# Patient Record
Sex: MALE | Race: WHITE | Employment: UNEMPLOYED | URBAN - METROPOLITAN AREA
[De-identification: names, ages, dates, MRNs, and addresses within clinical notes are randomized per-mention and may not be internally consistent; named-entity substitution may affect disease eponyms.]

---

## 2019-11-22 ENCOUNTER — HOSPITAL ENCOUNTER (OUTPATIENT)
Age: 44
Setting detail: OBSERVATION
Discharge: PSYCHIATRIC OR SUBSTANCE USE FACILITY | DRG: 753 | End: 2019-11-25
Attending: INTERNAL MEDICINE | Admitting: INTERNAL MEDICINE
Payer: MEDICAID

## 2019-11-22 ENCOUNTER — APPOINTMENT (OUTPATIENT)
Dept: CT IMAGING | Age: 44
DRG: 753 | End: 2019-11-22
Payer: MEDICAID

## 2019-11-22 DIAGNOSIS — F32.A DEPRESSION WITH SUICIDAL IDEATION: ICD-10-CM

## 2019-11-22 DIAGNOSIS — R45.851 DEPRESSION WITH SUICIDAL IDEATION: ICD-10-CM

## 2019-11-22 DIAGNOSIS — F10.939 ALCOHOL WITHDRAWAL SYNDROME WITH COMPLICATION (HCC): Primary | ICD-10-CM

## 2019-11-22 LAB
ACETAMINOPHEN LEVEL: <5 UG/ML (ref 10–30)
ALBUMIN SERPL-MCNC: 4.7 G/DL (ref 3.5–4.6)
ALP BLD-CCNC: 93 U/L (ref 35–104)
ALT SERPL-CCNC: 64 U/L (ref 0–41)
AMPHETAMINE SCREEN, URINE: NORMAL
ANION GAP SERPL CALCULATED.3IONS-SCNC: 13 MEQ/L (ref 9–15)
AST SERPL-CCNC: 111 U/L (ref 0–40)
BACTERIA: NEGATIVE /HPF
BARBITURATE SCREEN URINE: NORMAL
BASOPHILS ABSOLUTE: 0.1 K/UL (ref 0–0.2)
BASOPHILS RELATIVE PERCENT: 1.1 %
BENZODIAZEPINE SCREEN, URINE: NORMAL
BILIRUB SERPL-MCNC: 0.4 MG/DL (ref 0.2–0.7)
BILIRUBIN URINE: NEGATIVE
BLOOD, URINE: NEGATIVE
BUN BLDV-MCNC: 4 MG/DL (ref 6–20)
CALCIUM SERPL-MCNC: 8.8 MG/DL (ref 8.5–9.9)
CANNABINOID SCREEN URINE: NORMAL
CHLORIDE BLD-SCNC: 91 MEQ/L (ref 95–107)
CK MB: 2.3 NG/ML (ref 0–6.7)
CLARITY: CLEAR
CO2: 28 MEQ/L (ref 20–31)
COCAINE METABOLITE SCREEN URINE: NORMAL
COLOR: YELLOW
CREAT SERPL-MCNC: 0.45 MG/DL (ref 0.7–1.2)
CREATINE KINASE-MB INDEX: 1.1 % (ref 0–3.5)
D DIMER: 0.89 MG/L FEU (ref 0–0.5)
EOSINOPHILS ABSOLUTE: 0.1 K/UL (ref 0–0.7)
EOSINOPHILS RELATIVE PERCENT: 1.7 %
EPITHELIAL CELLS, UA: NORMAL /HPF
ETHANOL PERCENT: 0.37 G/DL
ETHANOL: 421 MG/DL (ref 0–0.08)
GFR AFRICAN AMERICAN: >60
GFR NON-AFRICAN AMERICAN: >60
GLOBULIN: 3.4 G/DL (ref 2.3–3.5)
GLUCOSE BLD-MCNC: 85 MG/DL (ref 70–99)
GLUCOSE URINE: NEGATIVE MG/DL
HCT VFR BLD CALC: 47 % (ref 42–52)
HEMOGLOBIN: 16.2 G/DL (ref 14–18)
KETONES, URINE: NEGATIVE MG/DL
LEUKOCYTE ESTERASE, URINE: ABNORMAL
LYMPHOCYTES ABSOLUTE: 2.1 K/UL (ref 1–4.8)
LYMPHOCYTES RELATIVE PERCENT: 33.9 %
Lab: NORMAL
MCH RBC QN AUTO: 34.2 PG (ref 27–31.3)
MCHC RBC AUTO-ENTMCNC: 34.4 % (ref 33–37)
MCV RBC AUTO: 99.6 FL (ref 80–100)
METHADONE SCREEN, URINE: NORMAL
MONOCYTES ABSOLUTE: 0.5 K/UL (ref 0.2–0.8)
MONOCYTES RELATIVE PERCENT: 7.5 %
NEUTROPHILS ABSOLUTE: 3.5 K/UL (ref 1.4–6.5)
NEUTROPHILS RELATIVE PERCENT: 55.8 %
NITRITE, URINE: NEGATIVE
OPIATE SCREEN URINE: NORMAL
OXYCODONE URINE: NORMAL
PDW BLD-RTO: 13.5 % (ref 11.5–14.5)
PH UA: 6.5 (ref 5–9)
PHENCYCLIDINE SCREEN URINE: NORMAL
PLATELET # BLD: 184 K/UL (ref 130–400)
POTASSIUM SERPL-SCNC: 3.9 MEQ/L (ref 3.4–4.9)
PROPOXYPHENE SCREEN: NORMAL
PROTEIN UA: NEGATIVE MG/DL
RBC # BLD: 4.72 M/UL (ref 4.7–6.1)
RBC UA: NORMAL /HPF (ref 0–2)
SALICYLATE, SERUM: <0.3 MG/DL (ref 15–30)
SODIUM BLD-SCNC: 132 MEQ/L (ref 135–144)
SPECIFIC GRAVITY UA: 1 (ref 1–1.03)
TOTAL CK: 217 U/L (ref 0–190)
TOTAL PROTEIN: 8.1 G/DL (ref 6.3–8)
TSH SERPL DL<=0.05 MIU/L-ACNC: 2.02 UIU/ML (ref 0.44–3.86)
URINE REFLEX TO CULTURE: YES
UROBILINOGEN, URINE: 0.2 E.U./DL
WBC # BLD: 6.3 K/UL (ref 4.8–10.8)
WBC UA: NORMAL /HPF (ref 0–5)

## 2019-11-22 PROCEDURE — 80307 DRUG TEST PRSMV CHEM ANLYZR: CPT

## 2019-11-22 PROCEDURE — 85025 COMPLETE CBC W/AUTO DIFF WBC: CPT

## 2019-11-22 PROCEDURE — 71275 CT ANGIOGRAPHY CHEST: CPT

## 2019-11-22 PROCEDURE — 6370000000 HC RX 637 (ALT 250 FOR IP): Performed by: PHYSICIAN ASSISTANT

## 2019-11-22 PROCEDURE — 6360000004 HC RX CONTRAST MEDICATION: Performed by: PHYSICIAN ASSISTANT

## 2019-11-22 PROCEDURE — G0480 DRUG TEST DEF 1-7 CLASSES: HCPCS

## 2019-11-22 PROCEDURE — 99285 EMERGENCY DEPT VISIT HI MDM: CPT

## 2019-11-22 PROCEDURE — 82553 CREATINE MB FRACTION: CPT

## 2019-11-22 PROCEDURE — 87086 URINE CULTURE/COLONY COUNT: CPT

## 2019-11-22 PROCEDURE — 36415 COLL VENOUS BLD VENIPUNCTURE: CPT

## 2019-11-22 PROCEDURE — 82550 ASSAY OF CK (CPK): CPT

## 2019-11-22 PROCEDURE — 85379 FIBRIN DEGRADATION QUANT: CPT

## 2019-11-22 PROCEDURE — 84443 ASSAY THYROID STIM HORMONE: CPT

## 2019-11-22 PROCEDURE — 81001 URINALYSIS AUTO W/SCOPE: CPT

## 2019-11-22 PROCEDURE — 80053 COMPREHEN METABOLIC PANEL: CPT

## 2019-11-22 RX ORDER — NICOTINE 21 MG/24HR
1 PATCH, TRANSDERMAL 24 HOURS TRANSDERMAL ONCE
Status: COMPLETED | OUTPATIENT
Start: 2019-11-22 | End: 2019-11-23

## 2019-11-22 RX ADMIN — IOPAMIDOL 100 ML: 612 INJECTION, SOLUTION INTRAVENOUS at 22:34

## 2019-11-22 ASSESSMENT — ENCOUNTER SYMPTOMS
COUGH: 1
VOMITING: 0
APNEA: 0
NAUSEA: 0
ANAL BLEEDING: 0
EYE DISCHARGE: 0
VOICE CHANGE: 0
PHOTOPHOBIA: 0
ABDOMINAL DISTENTION: 0

## 2019-11-23 LAB
EKG ATRIAL RATE: 79 BPM
EKG P AXIS: 71 DEGREES
EKG P-R INTERVAL: 150 MS
EKG Q-T INTERVAL: 360 MS
EKG QRS DURATION: 84 MS
EKG QTC CALCULATION (BAZETT): 412 MS
EKG R AXIS: 72 DEGREES
EKG T AXIS: 70 DEGREES
EKG VENTRICULAR RATE: 79 BPM
ETHANOL PERCENT: 0.01 G/DL
ETHANOL: 16 MG/DL (ref 0–0.08)

## 2019-11-23 PROCEDURE — 6370000000 HC RX 637 (ALT 250 FOR IP): Performed by: EMERGENCY MEDICINE

## 2019-11-23 PROCEDURE — 6360000002 HC RX W HCPCS: Performed by: STUDENT IN AN ORGANIZED HEALTH CARE EDUCATION/TRAINING PROGRAM

## 2019-11-23 PROCEDURE — 36415 COLL VENOUS BLD VENIPUNCTURE: CPT

## 2019-11-23 PROCEDURE — 96374 THER/PROPH/DIAG INJ IV PUSH: CPT

## 2019-11-23 PROCEDURE — 6370000000 HC RX 637 (ALT 250 FOR IP): Performed by: STUDENT IN AN ORGANIZED HEALTH CARE EDUCATION/TRAINING PROGRAM

## 2019-11-23 PROCEDURE — G0480 DRUG TEST DEF 1-7 CLASSES: HCPCS

## 2019-11-23 PROCEDURE — 93005 ELECTROCARDIOGRAM TRACING: CPT | Performed by: EMERGENCY MEDICINE

## 2019-11-23 RX ORDER — CHLORDIAZEPOXIDE HYDROCHLORIDE 25 MG/1
50 CAPSULE, GELATIN COATED ORAL 4 TIMES DAILY
Status: DISCONTINUED | OUTPATIENT
Start: 2019-11-23 | End: 2019-11-24

## 2019-11-23 RX ORDER — CHLORDIAZEPOXIDE HYDROCHLORIDE 25 MG/1
50 CAPSULE, GELATIN COATED ORAL ONCE
Status: COMPLETED | OUTPATIENT
Start: 2019-11-23 | End: 2019-11-23

## 2019-11-23 RX ORDER — ACETAMINOPHEN 325 MG/1
650 TABLET ORAL EVERY 4 HOURS PRN
Status: DISCONTINUED | OUTPATIENT
Start: 2019-11-23 | End: 2019-11-25 | Stop reason: HOSPADM

## 2019-11-23 RX ORDER — LORAZEPAM 1 MG/1
2 TABLET ORAL ONCE
Status: COMPLETED | OUTPATIENT
Start: 2019-11-23 | End: 2019-11-23

## 2019-11-23 RX ORDER — LORAZEPAM 2 MG/ML
2 INJECTION INTRAMUSCULAR ONCE
Status: COMPLETED | OUTPATIENT
Start: 2019-11-23 | End: 2019-11-23

## 2019-11-23 RX ADMIN — LORAZEPAM 2 MG: 2 INJECTION INTRAMUSCULAR; INTRAVENOUS at 15:46

## 2019-11-23 RX ADMIN — CHLORDIAZEPOXIDE HYDROCHLORIDE 50 MG: 25 CAPSULE ORAL at 20:29

## 2019-11-23 RX ADMIN — ACETAMINOPHEN 650 MG: 325 TABLET ORAL at 21:06

## 2019-11-23 RX ADMIN — LORAZEPAM 2 MG: 1 TABLET ORAL at 19:02

## 2019-11-23 RX ADMIN — CHLORDIAZEPOXIDE HYDROCHLORIDE 50 MG: 25 CAPSULE ORAL at 22:24

## 2019-11-23 RX ADMIN — LORAZEPAM 2 MG: 1 TABLET ORAL at 07:39

## 2019-11-23 ASSESSMENT — PAIN SCALES - GENERAL: PAINLEVEL_OUTOF10: 5

## 2019-11-24 PROBLEM — F10.930 ALCOHOL WITHDRAWAL SYNDROME WITHOUT COMPLICATION (HCC): Status: ACTIVE | Noted: 2019-11-24

## 2019-11-24 LAB
ALBUMIN SERPL-MCNC: 4.3 G/DL (ref 3.5–4.6)
ALP BLD-CCNC: 64 U/L (ref 35–104)
ALT SERPL-CCNC: 85 U/L (ref 0–41)
ANION GAP SERPL CALCULATED.3IONS-SCNC: 11 MEQ/L (ref 9–15)
AST SERPL-CCNC: 159 U/L (ref 0–40)
BASOPHILS ABSOLUTE: 0 K/UL (ref 0–0.2)
BASOPHILS RELATIVE PERCENT: 0.7 %
BILIRUB SERPL-MCNC: 0.9 MG/DL (ref 0.2–0.7)
BUN BLDV-MCNC: 10 MG/DL (ref 6–20)
CALCIUM SERPL-MCNC: 9.2 MG/DL (ref 8.5–9.9)
CHLORIDE BLD-SCNC: 101 MEQ/L (ref 95–107)
CO2: 29 MEQ/L (ref 20–31)
CREAT SERPL-MCNC: 0.64 MG/DL (ref 0.7–1.2)
EOSINOPHILS ABSOLUTE: 0.2 K/UL (ref 0–0.7)
EOSINOPHILS RELATIVE PERCENT: 2.5 %
GFR AFRICAN AMERICAN: >60
GFR NON-AFRICAN AMERICAN: >60
GLOBULIN: 3.2 G/DL (ref 2.3–3.5)
GLUCOSE BLD-MCNC: 86 MG/DL (ref 70–99)
HCT VFR BLD CALC: 48.9 % (ref 42–52)
HEMOGLOBIN: 16.5 G/DL (ref 14–18)
INR BLD: 1
LYMPHOCYTES ABSOLUTE: 1 K/UL (ref 1–4.8)
LYMPHOCYTES RELATIVE PERCENT: 15.3 %
MAGNESIUM: 2.3 MG/DL (ref 1.7–2.4)
MCH RBC QN AUTO: 34.1 PG (ref 27–31.3)
MCHC RBC AUTO-ENTMCNC: 33.8 % (ref 33–37)
MCV RBC AUTO: 100.9 FL (ref 80–100)
MONOCYTES ABSOLUTE: 0.4 K/UL (ref 0.2–0.8)
MONOCYTES RELATIVE PERCENT: 6.1 %
NEUTROPHILS ABSOLUTE: 4.8 K/UL (ref 1.4–6.5)
NEUTROPHILS RELATIVE PERCENT: 75.4 %
PDW BLD-RTO: 13.5 % (ref 11.5–14.5)
PHOSPHORUS: 3.9 MG/DL (ref 2.3–4.8)
PLATELET # BLD: 169 K/UL (ref 130–400)
POTASSIUM SERPL-SCNC: 4.4 MEQ/L (ref 3.4–4.9)
PROTHROMBIN TIME: 13.2 SEC (ref 12.3–14.9)
RBC # BLD: 4.85 M/UL (ref 4.7–6.1)
SODIUM BLD-SCNC: 141 MEQ/L (ref 135–144)
TOTAL PROTEIN: 7.5 G/DL (ref 6.3–8)
URINE CULTURE, ROUTINE: NORMAL
WBC # BLD: 6.3 K/UL (ref 4.8–10.8)

## 2019-11-24 PROCEDURE — 84100 ASSAY OF PHOSPHORUS: CPT

## 2019-11-24 PROCEDURE — 6360000002 HC RX W HCPCS: Performed by: INTERNAL MEDICINE

## 2019-11-24 PROCEDURE — G0378 HOSPITAL OBSERVATION PER HR: HCPCS

## 2019-11-24 PROCEDURE — 83735 ASSAY OF MAGNESIUM: CPT

## 2019-11-24 PROCEDURE — 6370000000 HC RX 637 (ALT 250 FOR IP): Performed by: NURSE PRACTITIONER

## 2019-11-24 PROCEDURE — 80053 COMPREHEN METABOLIC PANEL: CPT

## 2019-11-24 PROCEDURE — 2580000003 HC RX 258: Performed by: INTERNAL MEDICINE

## 2019-11-24 PROCEDURE — 96372 THER/PROPH/DIAG INJ SC/IM: CPT

## 2019-11-24 PROCEDURE — 36415 COLL VENOUS BLD VENIPUNCTURE: CPT

## 2019-11-24 PROCEDURE — 85025 COMPLETE CBC W/AUTO DIFF WBC: CPT

## 2019-11-24 PROCEDURE — 87389 HIV-1 AG W/HIV-1&-2 AB AG IA: CPT

## 2019-11-24 PROCEDURE — 6370000000 HC RX 637 (ALT 250 FOR IP): Performed by: EMERGENCY MEDICINE

## 2019-11-24 PROCEDURE — 6370000000 HC RX 637 (ALT 250 FOR IP): Performed by: INTERNAL MEDICINE

## 2019-11-24 PROCEDURE — 85610 PROTHROMBIN TIME: CPT

## 2019-11-24 RX ORDER — CHLORDIAZEPOXIDE HYDROCHLORIDE 25 MG/1
25 CAPSULE, GELATIN COATED ORAL ONCE
Status: COMPLETED | OUTPATIENT
Start: 2019-11-24 | End: 2019-11-24

## 2019-11-24 RX ORDER — LORAZEPAM 2 MG/ML
2 INJECTION INTRAMUSCULAR
Status: DISCONTINUED | OUTPATIENT
Start: 2019-11-24 | End: 2019-11-25 | Stop reason: HOSPADM

## 2019-11-24 RX ORDER — ONDANSETRON 4 MG/1
4 TABLET, FILM COATED ORAL EVERY 8 HOURS PRN
Status: DISCONTINUED | OUTPATIENT
Start: 2019-11-24 | End: 2019-11-25 | Stop reason: HOSPADM

## 2019-11-24 RX ORDER — NICOTINE 21 MG/24HR
1 PATCH, TRANSDERMAL 24 HOURS TRANSDERMAL DAILY
Status: DISCONTINUED | OUTPATIENT
Start: 2019-11-24 | End: 2019-11-25 | Stop reason: HOSPADM

## 2019-11-24 RX ORDER — CLONIDINE HYDROCHLORIDE 0.1 MG/1
0.1 TABLET ORAL ONCE
Status: COMPLETED | OUTPATIENT
Start: 2019-11-24 | End: 2019-11-24

## 2019-11-24 RX ORDER — LORAZEPAM 2 MG/ML
1 INJECTION INTRAMUSCULAR
Status: DISCONTINUED | OUTPATIENT
Start: 2019-11-24 | End: 2019-11-25 | Stop reason: HOSPADM

## 2019-11-24 RX ORDER — LORAZEPAM 1 MG/1
3 TABLET ORAL
Status: DISCONTINUED | OUTPATIENT
Start: 2019-11-24 | End: 2019-11-25 | Stop reason: HOSPADM

## 2019-11-24 RX ORDER — LORAZEPAM 2 MG/ML
4 INJECTION INTRAMUSCULAR
Status: DISCONTINUED | OUTPATIENT
Start: 2019-11-24 | End: 2019-11-25 | Stop reason: HOSPADM

## 2019-11-24 RX ORDER — FOLIC ACID 1 MG/1
1 TABLET ORAL DAILY
Status: DISCONTINUED | OUTPATIENT
Start: 2019-11-24 | End: 2019-11-25 | Stop reason: HOSPADM

## 2019-11-24 RX ORDER — LORAZEPAM 1 MG/1
2 TABLET ORAL ONCE
Status: COMPLETED | OUTPATIENT
Start: 2019-11-24 | End: 2019-11-24

## 2019-11-24 RX ORDER — LORAZEPAM 1 MG/1
1 TABLET ORAL ONCE
Status: COMPLETED | OUTPATIENT
Start: 2019-11-24 | End: 2019-11-24

## 2019-11-24 RX ORDER — CHLORDIAZEPOXIDE HYDROCHLORIDE 10 MG/1
10 CAPSULE, GELATIN COATED ORAL EVERY 6 HOURS
Status: COMPLETED | OUTPATIENT
Start: 2019-11-24 | End: 2019-11-24

## 2019-11-24 RX ORDER — LORAZEPAM 1 MG/1
4 TABLET ORAL
Status: DISCONTINUED | OUTPATIENT
Start: 2019-11-24 | End: 2019-11-25 | Stop reason: HOSPADM

## 2019-11-24 RX ORDER — SODIUM CHLORIDE 0.9 % (FLUSH) 0.9 %
10 SYRINGE (ML) INJECTION EVERY 12 HOURS SCHEDULED
Status: DISCONTINUED | OUTPATIENT
Start: 2019-11-24 | End: 2019-11-25 | Stop reason: HOSPADM

## 2019-11-24 RX ORDER — LORAZEPAM 1 MG/1
2 TABLET ORAL
Status: DISCONTINUED | OUTPATIENT
Start: 2019-11-24 | End: 2019-11-25 | Stop reason: HOSPADM

## 2019-11-24 RX ORDER — ONDANSETRON 2 MG/ML
4 INJECTION INTRAMUSCULAR; INTRAVENOUS EVERY 6 HOURS PRN
Status: DISCONTINUED | OUTPATIENT
Start: 2019-11-24 | End: 2019-11-25 | Stop reason: HOSPADM

## 2019-11-24 RX ORDER — SODIUM CHLORIDE 0.9 % (FLUSH) 0.9 %
10 SYRINGE (ML) INJECTION PRN
Status: DISCONTINUED | OUTPATIENT
Start: 2019-11-24 | End: 2019-11-25 | Stop reason: HOSPADM

## 2019-11-24 RX ORDER — LORAZEPAM 2 MG/ML
3 INJECTION INTRAMUSCULAR
Status: DISCONTINUED | OUTPATIENT
Start: 2019-11-24 | End: 2019-11-25 | Stop reason: HOSPADM

## 2019-11-24 RX ORDER — LORAZEPAM 1 MG/1
1 TABLET ORAL
Status: DISCONTINUED | OUTPATIENT
Start: 2019-11-24 | End: 2019-11-25 | Stop reason: HOSPADM

## 2019-11-24 RX ORDER — MULTIVITAMIN WITH FOLIC ACID 400 MCG
1 TABLET ORAL DAILY
Status: DISCONTINUED | OUTPATIENT
Start: 2019-11-24 | End: 2019-11-25 | Stop reason: HOSPADM

## 2019-11-24 RX ORDER — THIAMINE MONONITRATE (VIT B1) 100 MG
100 TABLET ORAL DAILY
Status: DISCONTINUED | OUTPATIENT
Start: 2019-11-24 | End: 2019-11-25 | Stop reason: HOSPADM

## 2019-11-24 RX ORDER — NICOTINE 21 MG/24HR
1 PATCH, TRANSDERMAL 24 HOURS TRANSDERMAL DAILY
Status: DISCONTINUED | OUTPATIENT
Start: 2019-11-24 | End: 2019-11-24

## 2019-11-24 RX ADMIN — FOLIC ACID 1 MG: 1 TABLET ORAL at 09:14

## 2019-11-24 RX ADMIN — CHLORDIAZEPOXIDE HYDROCHLORIDE 25 MG: 25 CAPSULE ORAL at 09:13

## 2019-11-24 RX ADMIN — Medication 100 MG: at 09:13

## 2019-11-24 RX ADMIN — THERA TABS 1 TABLET: TAB at 09:14

## 2019-11-24 RX ADMIN — ENOXAPARIN SODIUM 40 MG: 40 INJECTION SUBCUTANEOUS at 09:14

## 2019-11-24 RX ADMIN — LORAZEPAM 2 MG: 1 TABLET ORAL at 06:16

## 2019-11-24 RX ADMIN — CLONIDINE HYDROCHLORIDE 0.1 MG: 0.1 TABLET ORAL at 09:14

## 2019-11-24 RX ADMIN — LORAZEPAM 2 MG: 1 TABLET ORAL at 03:55

## 2019-11-24 RX ADMIN — CHLORDIAZEPOXIDE HYDROCHLORIDE 10 MG: 10 CAPSULE ORAL at 14:15

## 2019-11-24 RX ADMIN — Medication 10 ML: at 20:07

## 2019-11-24 RX ADMIN — CHLORDIAZEPOXIDE HYDROCHLORIDE 10 MG: 10 CAPSULE ORAL at 20:07

## 2019-11-24 RX ADMIN — LORAZEPAM 1 MG: 1 TABLET ORAL at 00:24

## 2019-11-24 ASSESSMENT — PAIN SCALES - GENERAL: PAINLEVEL_OUTOF10: 0

## 2019-11-25 ENCOUNTER — HOSPITAL ENCOUNTER (INPATIENT)
Age: 44
LOS: 4 days | Discharge: HOME | DRG: 753 | End: 2019-11-29
Attending: PSYCHIATRY & NEUROLOGY | Admitting: PSYCHIATRY & NEUROLOGY
Payer: MEDICAID

## 2019-11-25 VITALS
SYSTOLIC BLOOD PRESSURE: 129 MMHG | OXYGEN SATURATION: 99 % | HEIGHT: 67 IN | RESPIRATION RATE: 16 BRPM | HEART RATE: 76 BPM | TEMPERATURE: 97 F | DIASTOLIC BLOOD PRESSURE: 89 MMHG | BODY MASS INDEX: 18.83 KG/M2 | WEIGHT: 120 LBS

## 2019-11-25 PROBLEM — F31.9 BIPOLAR I DISORDER (HCC): Status: ACTIVE | Noted: 2019-11-25

## 2019-11-25 PROCEDURE — 6360000002 HC RX W HCPCS: Performed by: INTERNAL MEDICINE

## 2019-11-25 PROCEDURE — G0378 HOSPITAL OBSERVATION PER HR: HCPCS

## 2019-11-25 PROCEDURE — 93010 ELECTROCARDIOGRAM REPORT: CPT | Performed by: INTERNAL MEDICINE

## 2019-11-25 PROCEDURE — 6370000000 HC RX 637 (ALT 250 FOR IP): Performed by: PSYCHIATRY & NEUROLOGY

## 2019-11-25 PROCEDURE — 6370000000 HC RX 637 (ALT 250 FOR IP): Performed by: NURSE PRACTITIONER

## 2019-11-25 PROCEDURE — 1240000000 HC EMOTIONAL WELLNESS R&B

## 2019-11-25 PROCEDURE — 2580000003 HC RX 258: Performed by: INTERNAL MEDICINE

## 2019-11-25 PROCEDURE — 90686 IIV4 VACC NO PRSV 0.5 ML IM: CPT | Performed by: INTERNAL MEDICINE

## 2019-11-25 PROCEDURE — 96372 THER/PROPH/DIAG INJ SC/IM: CPT

## 2019-11-25 PROCEDURE — 6370000000 HC RX 637 (ALT 250 FOR IP): Performed by: INTERNAL MEDICINE

## 2019-11-25 PROCEDURE — G0008 ADMIN INFLUENZA VIRUS VAC: HCPCS | Performed by: INTERNAL MEDICINE

## 2019-11-25 RX ORDER — ONDANSETRON 4 MG/1
4 TABLET, FILM COATED ORAL EVERY 8 HOURS PRN
Status: CANCELLED | OUTPATIENT
Start: 2019-11-25

## 2019-11-25 RX ORDER — NICOTINE 21 MG/24HR
1 PATCH, TRANSDERMAL 24 HOURS TRANSDERMAL DAILY
Status: CANCELLED | OUTPATIENT
Start: 2019-11-26

## 2019-11-25 RX ORDER — SODIUM CHLORIDE 0.9 % (FLUSH) 0.9 %
10 SYRINGE (ML) INJECTION PRN
Status: DISCONTINUED | OUTPATIENT
Start: 2019-11-25 | End: 2019-11-29 | Stop reason: HOSPADM

## 2019-11-25 RX ORDER — CHLORDIAZEPOXIDE HYDROCHLORIDE 25 MG/1
50 CAPSULE, GELATIN COATED ORAL
Status: DISCONTINUED | OUTPATIENT
Start: 2019-11-25 | End: 2019-11-29 | Stop reason: HOSPADM

## 2019-11-25 RX ORDER — TRAZODONE HYDROCHLORIDE 50 MG/1
50 TABLET ORAL NIGHTLY PRN
Status: DISCONTINUED | OUTPATIENT
Start: 2019-11-25 | End: 2019-11-29 | Stop reason: HOSPADM

## 2019-11-25 RX ORDER — BENZTROPINE MESYLATE 1 MG/ML
2 INJECTION INTRAMUSCULAR; INTRAVENOUS 2 TIMES DAILY PRN
Status: DISCONTINUED | OUTPATIENT
Start: 2019-11-25 | End: 2019-11-29 | Stop reason: HOSPADM

## 2019-11-25 RX ORDER — MAGNESIUM HYDROXIDE/ALUMINUM HYDROXICE/SIMETHICONE 120; 1200; 1200 MG/30ML; MG/30ML; MG/30ML
30 SUSPENSION ORAL PRN
Status: DISCONTINUED | OUTPATIENT
Start: 2019-11-25 | End: 2019-11-29 | Stop reason: HOSPADM

## 2019-11-25 RX ORDER — THIAMINE MONONITRATE (VIT B1) 100 MG
100 TABLET ORAL DAILY
Status: CANCELLED | OUTPATIENT
Start: 2019-11-26

## 2019-11-25 RX ORDER — BENZTROPINE MESYLATE 1 MG/ML
2 INJECTION INTRAMUSCULAR; INTRAVENOUS 2 TIMES DAILY PRN
Status: CANCELLED | OUTPATIENT
Start: 2019-11-25

## 2019-11-25 RX ORDER — CHLORDIAZEPOXIDE HYDROCHLORIDE 10 MG/1
10 CAPSULE, GELATIN COATED ORAL EVERY 4 HOURS PRN
Status: DISCONTINUED | OUTPATIENT
Start: 2019-11-25 | End: 2019-11-29 | Stop reason: HOSPADM

## 2019-11-25 RX ORDER — HYDROXYZINE PAMOATE 50 MG/1
50 CAPSULE ORAL EVERY 6 HOURS PRN
Status: DISCONTINUED | OUTPATIENT
Start: 2019-11-25 | End: 2019-11-29 | Stop reason: HOSPADM

## 2019-11-25 RX ORDER — CHLORDIAZEPOXIDE HYDROCHLORIDE 25 MG/1
25 CAPSULE, GELATIN COATED ORAL EVERY 4 HOURS PRN
Status: DISCONTINUED | OUTPATIENT
Start: 2019-11-25 | End: 2019-11-29 | Stop reason: HOSPADM

## 2019-11-25 RX ORDER — MULTIVITAMIN WITH FOLIC ACID 400 MCG
1 TABLET ORAL DAILY
Status: CANCELLED | OUTPATIENT
Start: 2019-11-26

## 2019-11-25 RX ORDER — HALOPERIDOL 5 MG/ML
5 INJECTION INTRAMUSCULAR EVERY 6 HOURS PRN
Status: DISCONTINUED | OUTPATIENT
Start: 2019-11-25 | End: 2019-11-29 | Stop reason: HOSPADM

## 2019-11-25 RX ORDER — ACETAMINOPHEN 325 MG/1
650 TABLET ORAL EVERY 4 HOURS PRN
Status: CANCELLED | OUTPATIENT
Start: 2019-11-25

## 2019-11-25 RX ORDER — MULTIVITAMIN WITH FOLIC ACID 400 MCG
1 TABLET ORAL DAILY
Status: DISCONTINUED | OUTPATIENT
Start: 2019-11-26 | End: 2019-11-29 | Stop reason: HOSPADM

## 2019-11-25 RX ORDER — SODIUM CHLORIDE 0.9 % (FLUSH) 0.9 %
10 SYRINGE (ML) INJECTION PRN
Status: CANCELLED | OUTPATIENT
Start: 2019-11-25

## 2019-11-25 RX ORDER — FOLIC ACID 1 MG/1
1 TABLET ORAL DAILY
Status: CANCELLED | OUTPATIENT
Start: 2019-11-26

## 2019-11-25 RX ORDER — FOLIC ACID 1 MG/1
1 TABLET ORAL DAILY
Status: DISCONTINUED | OUTPATIENT
Start: 2019-11-26 | End: 2019-11-29 | Stop reason: HOSPADM

## 2019-11-25 RX ORDER — ONDANSETRON 4 MG/1
4 TABLET, FILM COATED ORAL EVERY 8 HOURS PRN
Status: DISCONTINUED | OUTPATIENT
Start: 2019-11-25 | End: 2019-11-29 | Stop reason: HOSPADM

## 2019-11-25 RX ORDER — NICOTINE 21 MG/24HR
1 PATCH, TRANSDERMAL 24 HOURS TRANSDERMAL DAILY
Status: DISCONTINUED | OUTPATIENT
Start: 2019-11-26 | End: 2019-11-29 | Stop reason: HOSPADM

## 2019-11-25 RX ORDER — CHLORDIAZEPOXIDE HYDROCHLORIDE 25 MG/1
75 CAPSULE, GELATIN COATED ORAL
Status: DISCONTINUED | OUTPATIENT
Start: 2019-11-25 | End: 2019-11-29 | Stop reason: HOSPADM

## 2019-11-25 RX ORDER — SODIUM CHLORIDE 0.9 % (FLUSH) 0.9 %
10 SYRINGE (ML) INJECTION EVERY 12 HOURS SCHEDULED
Status: CANCELLED | OUTPATIENT
Start: 2019-11-25

## 2019-11-25 RX ORDER — TRAZODONE HYDROCHLORIDE 50 MG/1
50 TABLET ORAL NIGHTLY PRN
Status: CANCELLED | OUTPATIENT
Start: 2019-11-25

## 2019-11-25 RX ORDER — SODIUM CHLORIDE 0.9 % (FLUSH) 0.9 %
10 SYRINGE (ML) INJECTION EVERY 12 HOURS SCHEDULED
Status: DISCONTINUED | OUTPATIENT
Start: 2019-11-25 | End: 2019-11-26 | Stop reason: CLARIF

## 2019-11-25 RX ORDER — LORAZEPAM 1 MG/1
4 TABLET ORAL
Status: DISCONTINUED | OUTPATIENT
Start: 2019-11-25 | End: 2019-11-29 | Stop reason: HOSPADM

## 2019-11-25 RX ORDER — MAGNESIUM HYDROXIDE/ALUMINUM HYDROXICE/SIMETHICONE 120; 1200; 1200 MG/30ML; MG/30ML; MG/30ML
30 SUSPENSION ORAL PRN
Status: CANCELLED | OUTPATIENT
Start: 2019-11-25

## 2019-11-25 RX ORDER — THIAMINE MONONITRATE (VIT B1) 100 MG
100 TABLET ORAL DAILY
Status: DISCONTINUED | OUTPATIENT
Start: 2019-11-26 | End: 2019-11-29 | Stop reason: HOSPADM

## 2019-11-25 RX ORDER — ACETAMINOPHEN 325 MG/1
650 TABLET ORAL EVERY 4 HOURS PRN
Status: DISCONTINUED | OUTPATIENT
Start: 2019-11-25 | End: 2019-11-29 | Stop reason: HOSPADM

## 2019-11-25 RX ORDER — HYDROXYZINE HYDROCHLORIDE 50 MG/ML
50 INJECTION, SOLUTION INTRAMUSCULAR EVERY 6 HOURS PRN
Status: DISCONTINUED | OUTPATIENT
Start: 2019-11-25 | End: 2019-11-29 | Stop reason: HOSPADM

## 2019-11-25 RX ORDER — HALOPERIDOL 5 MG
5 TABLET ORAL EVERY 6 HOURS PRN
Status: DISCONTINUED | OUTPATIENT
Start: 2019-11-25 | End: 2019-11-29 | Stop reason: HOSPADM

## 2019-11-25 RX ADMIN — Medication 100 MG: at 08:38

## 2019-11-25 RX ADMIN — INFLUENZA A VIRUS A/BRISBANE/02/2018 IVR-190 (H1N1) ANTIGEN (PROPIOLACTONE INACTIVATED), INFLUENZA A VIRUS A/KANSAS/14/2017 X-327 (H3N2) ANTIGEN (PROPIOLACTONE INACTIVATED), INFLUENZA B VIRUS B/MARYLAND/15/2016 ANTIGEN (PROPIOLACTONE INACTIVATED), INFLUENZA B VIRUS B/PHUKET/3073/2013 BVR-1B ANTIGEN (PROPIOLACTONE INACTIVATED) 0.5 ML: 15; 15; 15; 15 INJECTION, SUSPENSION INTRAMUSCULAR at 08:34

## 2019-11-25 RX ADMIN — THERA TABS 1 TABLET: TAB at 08:39

## 2019-11-25 RX ADMIN — ENOXAPARIN SODIUM 40 MG: 40 INJECTION SUBCUTANEOUS at 08:33

## 2019-11-25 RX ADMIN — Medication 10 ML: at 10:01

## 2019-11-25 RX ADMIN — FOLIC ACID 1 MG: 1 TABLET ORAL at 08:39

## 2019-11-25 RX ADMIN — TRAZODONE HYDROCHLORIDE 50 MG: 50 TABLET ORAL at 20:45

## 2019-11-25 ASSESSMENT — PAIN SCALES - GENERAL
PAINLEVEL_OUTOF10: 0

## 2019-11-25 ASSESSMENT — SLEEP AND FATIGUE QUESTIONNAIRES
DO YOU HAVE DIFFICULTY SLEEPING: YES
AVERAGE NUMBER OF SLEEP HOURS: 6
DIFFICULTY STAYING ASLEEP: YES
DO YOU USE A SLEEP AID: YES
DIFFICULTY ARISING: NO
SLEEP PATTERN: DIFFICULTY FALLING ASLEEP;DISTURBED/INTERRUPTED SLEEP
RESTFUL SLEEP: YES
DIFFICULTY FALLING ASLEEP: YES

## 2019-11-25 ASSESSMENT — PATIENT HEALTH QUESTIONNAIRE - PHQ9: SUM OF ALL RESPONSES TO PHQ QUESTIONS 1-9: 7

## 2019-11-26 LAB
ALBUMIN SERPL-MCNC: 3.7 G/DL (ref 3.5–4.6)
ALP BLD-CCNC: 55 U/L (ref 35–104)
ALT SERPL-CCNC: 130 U/L (ref 0–41)
ANION GAP SERPL CALCULATED.3IONS-SCNC: 12 MEQ/L (ref 9–15)
AST SERPL-CCNC: 127 U/L (ref 0–40)
BILIRUB SERPL-MCNC: 0.5 MG/DL (ref 0.2–0.7)
BUN BLDV-MCNC: 9 MG/DL (ref 6–20)
CALCIUM SERPL-MCNC: 9 MG/DL (ref 8.5–9.9)
CHLORIDE BLD-SCNC: 103 MEQ/L (ref 95–107)
CHOLESTEROL, TOTAL: 146 MG/DL (ref 0–199)
CO2: 24 MEQ/L (ref 20–31)
CREAT SERPL-MCNC: 0.56 MG/DL (ref 0.7–1.2)
GFR AFRICAN AMERICAN: >60
GFR NON-AFRICAN AMERICAN: >60
GLOBULIN: 3.3 G/DL (ref 2.3–3.5)
GLUCOSE BLD-MCNC: 87 MG/DL (ref 70–99)
HDLC SERPL-MCNC: 85 MG/DL (ref 40–59)
HIV 1,2 COMBO ANTIGEN/ANTIBODY: NEGATIVE
LDL CHOLESTEROL CALCULATED: 44 MG/DL (ref 0–129)
POTASSIUM REFLEX MAGNESIUM: 3.7 MEQ/L (ref 3.4–4.9)
SODIUM BLD-SCNC: 139 MEQ/L (ref 135–144)
TOTAL PROTEIN: 7 G/DL (ref 6.3–8)
TRIGL SERPL-MCNC: 85 MG/DL (ref 0–150)

## 2019-11-26 PROCEDURE — 80061 LIPID PANEL: CPT

## 2019-11-26 PROCEDURE — 6370000000 HC RX 637 (ALT 250 FOR IP): Performed by: INTERNAL MEDICINE

## 2019-11-26 PROCEDURE — 6370000000 HC RX 637 (ALT 250 FOR IP): Performed by: PSYCHIATRY & NEUROLOGY

## 2019-11-26 PROCEDURE — 99223 1ST HOSP IP/OBS HIGH 75: CPT | Performed by: PSYCHIATRY & NEUROLOGY

## 2019-11-26 PROCEDURE — 1240000000 HC EMOTIONAL WELLNESS R&B

## 2019-11-26 PROCEDURE — 36415 COLL VENOUS BLD VENIPUNCTURE: CPT

## 2019-11-26 PROCEDURE — 80053 COMPREHEN METABOLIC PANEL: CPT

## 2019-11-26 RX ORDER — OXCARBAZEPINE 150 MG/1
150 TABLET, FILM COATED ORAL 2 TIMES DAILY
Status: DISCONTINUED | OUTPATIENT
Start: 2019-11-26 | End: 2019-11-28

## 2019-11-26 RX ADMIN — TRAZODONE HYDROCHLORIDE 50 MG: 50 TABLET ORAL at 20:57

## 2019-11-26 RX ADMIN — OXCARBAZEPINE 150 MG: 150 TABLET, FILM COATED ORAL at 20:57

## 2019-11-26 RX ADMIN — THERA TABS 1 TABLET: TAB at 10:11

## 2019-11-26 RX ADMIN — FOLIC ACID 1 MG: 1 TABLET ORAL at 10:11

## 2019-11-26 RX ADMIN — OXCARBAZEPINE 150 MG: 150 TABLET, FILM COATED ORAL at 12:03

## 2019-11-26 RX ADMIN — Medication 100 MG: at 10:11

## 2019-11-26 ASSESSMENT — PAIN SCALES - GENERAL: PAINLEVEL_OUTOF10: 0

## 2019-11-26 ASSESSMENT — LIFESTYLE VARIABLES: HISTORY_ALCOHOL_USE: NO

## 2019-11-27 PROCEDURE — 6370000000 HC RX 637 (ALT 250 FOR IP): Performed by: PSYCHIATRY & NEUROLOGY

## 2019-11-27 PROCEDURE — 1240000000 HC EMOTIONAL WELLNESS R&B

## 2019-11-27 PROCEDURE — 6370000000 HC RX 637 (ALT 250 FOR IP): Performed by: INTERNAL MEDICINE

## 2019-11-27 PROCEDURE — 99232 SBSQ HOSP IP/OBS MODERATE 35: CPT | Performed by: PSYCHIATRY & NEUROLOGY

## 2019-11-27 RX ADMIN — THERA TABS 1 TABLET: TAB at 08:23

## 2019-11-27 RX ADMIN — Medication 100 MG: at 08:23

## 2019-11-27 RX ADMIN — TRAZODONE HYDROCHLORIDE 50 MG: 50 TABLET ORAL at 21:36

## 2019-11-27 RX ADMIN — FOLIC ACID 1 MG: 1 TABLET ORAL at 08:26

## 2019-11-27 RX ADMIN — OXCARBAZEPINE 150 MG: 150 TABLET, FILM COATED ORAL at 21:36

## 2019-11-27 RX ADMIN — OXCARBAZEPINE 150 MG: 150 TABLET, FILM COATED ORAL at 08:23

## 2019-11-27 ASSESSMENT — PAIN SCALES - GENERAL: PAINLEVEL_OUTOF10: 0

## 2019-11-28 VITALS
OXYGEN SATURATION: 98 % | SYSTOLIC BLOOD PRESSURE: 122 MMHG | RESPIRATION RATE: 16 BRPM | HEART RATE: 85 BPM | DIASTOLIC BLOOD PRESSURE: 80 MMHG | TEMPERATURE: 98 F

## 2019-11-28 PROCEDURE — 6370000000 HC RX 637 (ALT 250 FOR IP): Performed by: PSYCHIATRY & NEUROLOGY

## 2019-11-28 PROCEDURE — 1240000000 HC EMOTIONAL WELLNESS R&B

## 2019-11-28 PROCEDURE — 6370000000 HC RX 637 (ALT 250 FOR IP): Performed by: INTERNAL MEDICINE

## 2019-11-28 RX ORDER — OXCARBAZEPINE 300 MG/1
300 TABLET, FILM COATED ORAL 2 TIMES DAILY
Status: DISCONTINUED | OUTPATIENT
Start: 2019-11-28 | End: 2019-11-29 | Stop reason: HOSPADM

## 2019-11-28 RX ADMIN — THERA TABS 1 TABLET: TAB at 08:59

## 2019-11-28 RX ADMIN — OXCARBAZEPINE 150 MG: 150 TABLET, FILM COATED ORAL at 08:59

## 2019-11-28 RX ADMIN — TRAZODONE HYDROCHLORIDE 50 MG: 50 TABLET ORAL at 21:09

## 2019-11-28 RX ADMIN — Medication 100 MG: at 08:59

## 2019-11-28 RX ADMIN — OXCARBAZEPINE 300 MG: 300 TABLET, FILM COATED ORAL at 21:08

## 2019-11-28 RX ADMIN — FOLIC ACID 1 MG: 1 TABLET ORAL at 08:59

## 2019-11-29 LAB
ALBUMIN SERPL-MCNC: 3.8 G/DL (ref 3.5–4.6)
ALP BLD-CCNC: 69 U/L (ref 35–104)
ALT SERPL-CCNC: 61 U/L (ref 0–41)
ANION GAP SERPL CALCULATED.3IONS-SCNC: 8 MEQ/L (ref 9–15)
AST SERPL-CCNC: 34 U/L (ref 0–40)
BILIRUB SERPL-MCNC: <0.2 MG/DL (ref 0.2–0.7)
BUN BLDV-MCNC: 8 MG/DL (ref 6–20)
CALCIUM SERPL-MCNC: 8.8 MG/DL (ref 8.5–9.9)
CHLORIDE BLD-SCNC: 99 MEQ/L (ref 95–107)
CO2: 28 MEQ/L (ref 20–31)
CREAT SERPL-MCNC: 0.57 MG/DL (ref 0.7–1.2)
GFR AFRICAN AMERICAN: >60
GFR NON-AFRICAN AMERICAN: >60
GLOBULIN: 2.6 G/DL (ref 2.3–3.5)
GLUCOSE BLD-MCNC: 88 MG/DL (ref 70–99)
POTASSIUM SERPL-SCNC: 4.2 MEQ/L (ref 3.4–4.9)
SODIUM BLD-SCNC: 135 MEQ/L (ref 135–144)
TOTAL PROTEIN: 6.4 G/DL (ref 6.3–8)

## 2019-11-29 PROCEDURE — 80053 COMPREHEN METABOLIC PANEL: CPT

## 2019-11-29 PROCEDURE — 99239 HOSP IP/OBS DSCHRG MGMT >30: CPT | Performed by: PSYCHIATRY & NEUROLOGY

## 2019-11-29 PROCEDURE — 36415 COLL VENOUS BLD VENIPUNCTURE: CPT

## 2019-11-29 PROCEDURE — 6370000000 HC RX 637 (ALT 250 FOR IP): Performed by: PSYCHIATRY & NEUROLOGY

## 2019-11-29 PROCEDURE — 6370000000 HC RX 637 (ALT 250 FOR IP): Performed by: INTERNAL MEDICINE

## 2019-11-29 RX ORDER — TRAZODONE HYDROCHLORIDE 50 MG/1
50 TABLET ORAL NIGHTLY
Qty: 14 TABLET | Refills: 0 | Status: SHIPPED | OUTPATIENT
Start: 2019-11-29 | End: 2020-01-24 | Stop reason: ALTCHOICE

## 2019-11-29 RX ORDER — OXCARBAZEPINE 300 MG/1
300 TABLET, FILM COATED ORAL 2 TIMES DAILY
Qty: 30 TABLET | Refills: 2 | Status: SHIPPED | OUTPATIENT
Start: 2019-11-29 | End: 2020-11-04 | Stop reason: SDUPTHER

## 2019-11-29 RX ORDER — OXCARBAZEPINE 300 MG/1
300 TABLET, FILM COATED ORAL 2 TIMES DAILY
Qty: 28 TABLET | Refills: 0 | Status: SHIPPED | OUTPATIENT
Start: 2019-11-29 | End: 2019-11-29

## 2019-11-29 RX ADMIN — Medication 100 MG: at 08:23

## 2019-11-29 RX ADMIN — FOLIC ACID 1 MG: 1 TABLET ORAL at 08:23

## 2019-11-29 RX ADMIN — OXCARBAZEPINE 300 MG: 300 TABLET, FILM COATED ORAL at 08:23

## 2019-11-29 RX ADMIN — THERA TABS 1 TABLET: TAB at 08:23

## 2020-01-24 ENCOUNTER — OFFICE VISIT (OUTPATIENT)
Dept: FAMILY MEDICINE CLINIC | Age: 45
End: 2020-01-24
Payer: MEDICAID

## 2020-01-24 VITALS
BODY MASS INDEX: 20.4 KG/M2 | WEIGHT: 134.6 LBS | HEART RATE: 77 BPM | SYSTOLIC BLOOD PRESSURE: 98 MMHG | HEIGHT: 68 IN | OXYGEN SATURATION: 95 % | TEMPERATURE: 97.7 F | DIASTOLIC BLOOD PRESSURE: 60 MMHG

## 2020-01-24 PROCEDURE — G8420 CALC BMI NORM PARAMETERS: HCPCS | Performed by: FAMILY MEDICINE

## 2020-01-24 PROCEDURE — G8482 FLU IMMUNIZE ORDER/ADMIN: HCPCS | Performed by: FAMILY MEDICINE

## 2020-01-24 PROCEDURE — G8427 DOCREV CUR MEDS BY ELIG CLIN: HCPCS | Performed by: FAMILY MEDICINE

## 2020-01-24 PROCEDURE — 4004F PT TOBACCO SCREEN RCVD TLK: CPT | Performed by: FAMILY MEDICINE

## 2020-01-24 PROCEDURE — 99203 OFFICE O/P NEW LOW 30 MIN: CPT | Performed by: FAMILY MEDICINE

## 2020-01-24 SDOH — ECONOMIC STABILITY: FOOD INSECURITY: WITHIN THE PAST 12 MONTHS, THE FOOD YOU BOUGHT JUST DIDN'T LAST AND YOU DIDN'T HAVE MONEY TO GET MORE.: NEVER TRUE

## 2020-01-24 SDOH — ECONOMIC STABILITY: FOOD INSECURITY: WITHIN THE PAST 12 MONTHS, YOU WORRIED THAT YOUR FOOD WOULD RUN OUT BEFORE YOU GOT MONEY TO BUY MORE.: NEVER TRUE

## 2020-01-24 SDOH — ECONOMIC STABILITY: INCOME INSECURITY: HOW HARD IS IT FOR YOU TO PAY FOR THE VERY BASICS LIKE FOOD, HOUSING, MEDICAL CARE, AND HEATING?: NOT HARD AT ALL

## 2020-01-24 SDOH — ECONOMIC STABILITY: TRANSPORTATION INSECURITY
IN THE PAST 12 MONTHS, HAS THE LACK OF TRANSPORTATION KEPT YOU FROM MEDICAL APPOINTMENTS OR FROM GETTING MEDICATIONS?: NO

## 2020-01-24 SDOH — ECONOMIC STABILITY: TRANSPORTATION INSECURITY
IN THE PAST 12 MONTHS, HAS LACK OF TRANSPORTATION KEPT YOU FROM MEETINGS, WORK, OR FROM GETTING THINGS NEEDED FOR DAILY LIVING?: NO

## 2020-01-24 ASSESSMENT — ENCOUNTER SYMPTOMS
WHEEZING: 0
FACIAL SWELLING: 0
COUGH: 0
EYE DISCHARGE: 0
EYE REDNESS: 0

## 2020-01-24 NOTE — PROGRESS NOTES
Subjective:      Patient ID: Penny Nickerson is a 38 y.o. male who presents for:  Chief Complaint   Patient presents with   [address]     last PCP - 1-2 years ago. Has Raynauds- Left big toe is bothering him states that it \"exploded\" x 1 week        Mood shifts more even and less desire to drink Trileptal.  Feeling pretty good. Not overly sedated or having any Side effects that he is concerned for. Pt notes his fingers have a couple fingers turn \"dead white\"  Can happen in a warm house. It is very unpredictable when it occurs. He is tried warm water. He is tried hand warmers, he cannot figure out any method to it. He feels like when he looks outside and sees its cold it can react. Pt has tried calcium channel blockers no help he also notes it happening in his toe. However it is only his left big toe. He does not get it in the other toes. He had the discomfort and whitening in the left toe to such degree a few weeks ago that it actually became a wound. It burst open and then ulcerated. He has been doing basic wound care with alcohol and bacitracin to it and feels that it is healing. Is a smoker. Current Outpatient Medications on File Prior to Visit   Medication Sig Dispense Refill    OXcarbazepine (TRILEPTAL) 300 MG tablet Take 1 tablet by mouth 2 times daily 30 tablet 2     No current facility-administered medications on file prior to visit.       Past Medical History:   Diagnosis Date    Depression      Past Surgical History:   Procedure Laterality Date    FRACTURE SURGERY Left     radius and ulna-- with mutliple skin grafts     Social History     Socioeconomic History    Marital status: Single     Spouse name: Not on file    Number of children: Not on file    Years of education: Not on file    Highest education level: Not on file   Occupational History    Not on file   Social Needs    Financial resource strain: Not hard at all    Food insecurity:     Worry: Never true and food allergies. Hematological: Negative for adenopathy. Does not bruise/bleed easily. Objective:   BP 98/60   Pulse 77   Temp 97.7 °F (36.5 °C)   Ht 5' 7.75\" (1.721 m) Comment: accurate  Wt 134 lb 9.6 oz (61.1 kg)   SpO2 95%   BMI 20.62 kg/m²     Physical Exam  Vitals signs reviewed. Constitutional:       General: He is not in acute distress. Appearance: He is well-developed. HENT:      Head: Normocephalic and atraumatic. Right Ear: External ear normal.      Left Ear: External ear normal.      Nose: Nose normal.   Eyes:      General:         Right eye: No discharge. Left eye: No discharge. Conjunctiva/sclera: Conjunctivae normal.      Pupils: Pupils are equal, round, and reactive to light. Neck:      Musculoskeletal: Neck supple. Thyroid: No thyromegaly. Cardiovascular:      Rate and Rhythm: Normal rate and regular rhythm. Pulmonary:      Effort: Pulmonary effort is normal. No respiratory distress. Abdominal:      General: There is no distension. Skin:     General: Skin is warm and dry. Comments: Left #1 toe medial aspect 1 cm ulceration noted with healthy granulation tissue and pink edges. No purulent discharge no erythema no induration   Neurological:      Mental Status: He is alert and oriented to person, place, and time. Coordination: Coordination normal.   Psychiatric:         Thought Content: Thought content normal.         Judgment: Judgment normal.         No results found for this visit on 01/24/20.     Recent Results (from the past 2016 hour(s))   Urine Drug Screen    Collection Time: 11/22/19  7:15 PM   Result Value Ref Range    Amphetamine Screen, Urine Neg Negative <1000 ng/mL    Barbiturate Screen, Ur Neg Negative < 200 ng/mL    Benzodiazepine Screen, Urine Neg Negative < 200 ng/mL    Cannabinoid Scrn, Ur Neg Negative < 50 ng/mL    Cocaine Metabolite Screen, Urine Neg Negative < 300 ng/mL    Opiate Scrn, Ur Neg Negative < 300 ng/mL PCP Screen, Urine Neg Negative < 25 ng/mL    Methadone Screen, Urine Neg Negative <300 ng/mL    Propoxyphene Scrn, Ur Neg Negative <300 ng/mL    Oxycodone Urine Neg Negative <100 ng/mL    Drug Screen Comment: see below    Urine Reflex to Culture    Collection Time: 11/22/19  7:15 PM   Result Value Ref Range    Color, UA Yellow Straw/Yellow    Clarity, UA Clear Clear    Glucose, Ur Negative Negative mg/dL    Bilirubin Urine Negative Negative    Ketones, Urine Negative Negative mg/dL    Specific Gravity, UA 1.002 1.005 - 1.030    Blood, Urine Negative Negative    pH, UA 6.5 5.0 - 9.0    Protein, UA Negative Negative mg/dL    Urobilinogen, Urine 0.2 <2.0 E.U./dL    Nitrite, Urine Negative Negative    Leukocyte Esterase, Urine TRACE (A) Negative    Urine Reflex to Culture YES    Urine Culture    Collection Time: 11/22/19  7:15 PM   Result Value Ref Range    Urine Culture, Routine       >50,000 CFU/ml of mixed liana  Multiple organisms isolated, no predominance. Culture  indicates probable contamination. Please review colony count  and clinical indications to determine if a repeat culture is  necessary. No further workup to be done.      Microscopic Urinalysis    Collection Time: 11/22/19  7:15 PM   Result Value Ref Range    WBC, UA 0-2 0 - 5 /HPF    RBC, UA 0-2 0 - 2 /HPF    Epi Cells 0-2 /HPF    Bacteria, UA Negative /HPF   Comprehensive Metabolic Panel    Collection Time: 11/22/19  7:28 PM   Result Value Ref Range    Sodium 132 (L) 135 - 144 mEq/L    Potassium 3.9 3.4 - 4.9 mEq/L    Chloride 91 (L) 95 - 107 mEq/L    CO2 28 20 - 31 mEq/L    Anion Gap 13 9 - 15 mEq/L    Glucose 85 70 - 99 mg/dL    BUN 4 (L) 6 - 20 mg/dL    CREATININE 0.45 (L) 0.70 - 1.20 mg/dL    GFR Non-African American >60.0 >60    GFR  >60.0 >60    Calcium 8.8 8.5 - 9.9 mg/dL    Total Protein 8.1 (H) 6.3 - 8.0 g/dL    Alb 4.7 (H) 3.5 - 4.6 g/dL    Total Bilirubin 0.4 0.2 - 0.7 mg/dL    Alkaline Phosphatase 93 35 - 104 U/L    ALT 64 (H) ms    QTc Calculation (Bazett) 412 ms    P Axis 71 degrees    R Axis 72 degrees    T Axis 70 degrees   CBC Auto Differential    Collection Time: 11/24/19  8:49 AM   Result Value Ref Range    WBC 6.3 4.8 - 10.8 K/uL    RBC 4.85 4.70 - 6.10 M/uL    Hemoglobin 16.5 14.0 - 18.0 g/dL    Hematocrit 48.9 42.0 - 52.0 %    .9 (H) 80.0 - 100.0 fL    MCH 34.1 (H) 27.0 - 31.3 pg    MCHC 33.8 33.0 - 37.0 %    RDW 13.5 11.5 - 14.5 %    Platelets 587 972 - 064 K/uL    Neutrophils % 75.4 %    Lymphocytes % 15.3 %    Monocytes % 6.1 %    Eosinophils % 2.5 %    Basophils % 0.7 %    Neutrophils Absolute 4.8 1.4 - 6.5 K/uL    Lymphocytes Absolute 1.0 1.0 - 4.8 K/uL    Monocytes Absolute 0.4 0.2 - 0.8 K/uL    Eosinophils Absolute 0.2 0.0 - 0.7 K/uL    Basophils Absolute 0.0 0.0 - 0.2 K/uL   Comprehensive Metabolic Panel    Collection Time: 11/24/19  8:49 AM   Result Value Ref Range    Sodium 141 135 - 144 mEq/L    Potassium 4.4 3.4 - 4.9 mEq/L    Chloride 101 95 - 107 mEq/L    CO2 29 20 - 31 mEq/L    Anion Gap 11 9 - 15 mEq/L    Glucose 86 70 - 99 mg/dL    BUN 10 6 - 20 mg/dL    CREATININE 0.64 (L) 0.70 - 1.20 mg/dL    GFR Non-African American >60.0 >60    GFR  >60.0 >60    Calcium 9.2 8.5 - 9.9 mg/dL    Total Protein 7.5 6.3 - 8.0 g/dL    Alb 4.3 3.5 - 4.6 g/dL    Total Bilirubin 0.9 (H) 0.2 - 0.7 mg/dL    Alkaline Phosphatase 64 35 - 104 U/L    ALT 85 (H) 0 - 41 U/L     (H) 0 - 40 U/L    Globulin 3.2 2.3 - 3.5 g/dL   Magnesium    Collection Time: 11/24/19  8:49 AM   Result Value Ref Range    Magnesium 2.3 1.7 - 2.4 mg/dL   Phosphorus    Collection Time: 11/24/19  8:49 AM   Result Value Ref Range    Phosphorus 3.9 2.3 - 4.8 mg/dL   Protime-INR    Collection Time: 11/24/19  8:49 AM   Result Value Ref Range    Protime 13.2 12.3 - 14.9 sec    INR 1.0    HIV-1,2 Combo Ag/Ab By BRANDON, Reflexive Panel    Collection Time: 11/24/19  8:49 AM   Result Value Ref Range    HIV 1,2 Combo Antigen/Antibody Negative Negative Paco George MD, Vascular Surgery, [place]     Referral Priority:   Routine     Referral Type:   Eval and Treat     Referral Reason:   Specialty Services Required     Referred to Provider:   Gio Valdes MD     Requested Specialty:   Thoracic Surgery     Number of Visits Requested:   1         Plan:   Return in about 3 months (around 4/24/2020) for for routine major medical condition management. Patient Instructions   Discussed at length with patient that the response of his left toe was not the typical Raynaud phenomenon. Concerned between his smoking and the localized nature of the toe blanching that he has blood flow compromise to that area. Referral to vascular. Benoit Meyer M.D.

## 2020-01-24 NOTE — PATIENT INSTRUCTIONS
Discussed at length with patient that the response of his left toe was not the typical Raynaud phenomenon. Concerned between his smoking and the localized nature of the toe blanching that he has blood flow compromise to that area. Referral to vascular.

## 2020-08-07 ENCOUNTER — APPOINTMENT (OUTPATIENT)
Dept: GENERAL RADIOLOGY | Age: 45
DRG: 135 | End: 2020-08-07
Payer: MEDICAID

## 2020-08-07 ENCOUNTER — APPOINTMENT (OUTPATIENT)
Dept: CT IMAGING | Age: 45
DRG: 135 | End: 2020-08-07
Payer: MEDICAID

## 2020-08-07 ENCOUNTER — HOSPITAL ENCOUNTER (INPATIENT)
Age: 45
LOS: 2 days | Discharge: HOME | DRG: 135 | End: 2020-08-10
Attending: EMERGENCY MEDICINE | Admitting: SURGERY
Payer: MEDICAID

## 2020-08-07 PROBLEM — G89.11 ACUTE PAIN DUE TO TRAUMA: Status: ACTIVE | Noted: 2020-08-07

## 2020-08-07 PROBLEM — Z86.79 HISTORY OF CARDIOVASCULAR DISORDER: Status: ACTIVE | Noted: 2020-08-07

## 2020-08-07 PROBLEM — S42.124D CLOSED NONDISPLACED FRACTURE OF ACROMIAL PROCESS OF RIGHT SCAPULA WITH ROUTINE HEALING: Status: ACTIVE | Noted: 2020-08-07

## 2020-08-07 PROBLEM — S27.0XXA PNEUMOTHORAX, TRAUMATIC: Status: ACTIVE | Noted: 2020-08-07

## 2020-08-07 PROBLEM — I73.00 RAYNAUD'S DISEASE: Status: ACTIVE | Noted: 2020-08-07

## 2020-08-07 PROBLEM — S22.41XD: Status: ACTIVE | Noted: 2020-08-07

## 2020-08-07 PROCEDURE — G0378 HOSPITAL OBSERVATION PER HR: HCPCS

## 2020-08-07 PROCEDURE — 96375 TX/PRO/DX INJ NEW DRUG ADDON: CPT

## 2020-08-07 PROCEDURE — 12002 RPR S/N/AX/GEN/TRNK2.6-7.5CM: CPT

## 2020-08-07 PROCEDURE — 96372 THER/PROPH/DIAG INJ SC/IM: CPT

## 2020-08-07 PROCEDURE — 94150 VITAL CAPACITY TEST: CPT

## 2020-08-07 PROCEDURE — 6370000000 HC RX 637 (ALT 250 FOR IP): Performed by: EMERGENCY MEDICINE

## 2020-08-07 PROCEDURE — 71250 CT THORAX DX C-: CPT

## 2020-08-07 PROCEDURE — 90471 IMMUNIZATION ADMIN: CPT | Performed by: EMERGENCY MEDICINE

## 2020-08-07 PROCEDURE — 73030 X-RAY EXAM OF SHOULDER: CPT

## 2020-08-07 PROCEDURE — 2580000003 HC RX 258: Performed by: SURGERY

## 2020-08-07 PROCEDURE — 6360000002 HC RX W HCPCS: Performed by: PHYSICIAN ASSISTANT

## 2020-08-07 PROCEDURE — 6360000002 HC RX W HCPCS: Performed by: EMERGENCY MEDICINE

## 2020-08-07 PROCEDURE — 70450 CT HEAD/BRAIN W/O DYE: CPT

## 2020-08-07 PROCEDURE — 99285 EMERGENCY DEPT VISIT HI MDM: CPT

## 2020-08-07 PROCEDURE — 99223 1ST HOSP IP/OBS HIGH 75: CPT | Performed by: SURGERY

## 2020-08-07 PROCEDURE — 2580000003 HC RX 258: Performed by: PHYSICIAN ASSISTANT

## 2020-08-07 PROCEDURE — 96374 THER/PROPH/DIAG INJ IV PUSH: CPT

## 2020-08-07 PROCEDURE — 73200 CT UPPER EXTREMITY W/O DYE: CPT

## 2020-08-07 PROCEDURE — 96376 TX/PRO/DX INJ SAME DRUG ADON: CPT

## 2020-08-07 PROCEDURE — 71046 X-RAY EXAM CHEST 2 VIEWS: CPT

## 2020-08-07 PROCEDURE — 6370000000 HC RX 637 (ALT 250 FOR IP): Performed by: PHYSICIAN ASSISTANT

## 2020-08-07 PROCEDURE — 90715 TDAP VACCINE 7 YRS/> IM: CPT | Performed by: EMERGENCY MEDICINE

## 2020-08-07 PROCEDURE — 6360000002 HC RX W HCPCS: Performed by: SURGERY

## 2020-08-07 PROCEDURE — 0HQ0XZZ REPAIR SCALP SKIN, EXTERNAL APPROACH: ICD-10-PCS | Performed by: EMERGENCY MEDICINE

## 2020-08-07 RX ORDER — LIDOCAINE HYDROCHLORIDE 20 MG/ML
JELLY TOPICAL ONCE
Status: COMPLETED | OUTPATIENT
Start: 2020-08-07 | End: 2020-08-07

## 2020-08-07 RX ORDER — GINSENG 100 MG
CAPSULE ORAL ONCE
Status: COMPLETED | OUTPATIENT
Start: 2020-08-07 | End: 2020-08-07

## 2020-08-07 RX ORDER — GINSENG 100 MG
CAPSULE ORAL ONCE
Status: DISCONTINUED | OUTPATIENT
Start: 2020-08-07 | End: 2020-08-07 | Stop reason: RX

## 2020-08-07 RX ORDER — LORAZEPAM 2 MG/ML
2 INJECTION INTRAMUSCULAR
Status: DISCONTINUED | OUTPATIENT
Start: 2020-08-07 | End: 2020-08-10

## 2020-08-07 RX ORDER — SODIUM CHLORIDE 0.9 % (FLUSH) 0.9 %
10 SYRINGE (ML) INJECTION PRN
Status: DISCONTINUED | OUTPATIENT
Start: 2020-08-07 | End: 2020-08-07 | Stop reason: SDUPTHER

## 2020-08-07 RX ORDER — ONDANSETRON 2 MG/ML
4 INJECTION INTRAMUSCULAR; INTRAVENOUS EVERY 6 HOURS PRN
Status: DISCONTINUED | OUTPATIENT
Start: 2020-08-07 | End: 2020-08-10 | Stop reason: HOSPADM

## 2020-08-07 RX ORDER — FOLIC ACID 1 MG/1
1 TABLET ORAL DAILY
Status: DISCONTINUED | OUTPATIENT
Start: 2020-08-08 | End: 2020-08-10 | Stop reason: HOSPADM

## 2020-08-07 RX ORDER — ACETAMINOPHEN 325 MG/1
650 TABLET ORAL
Status: DISCONTINUED | OUTPATIENT
Start: 2020-08-07 | End: 2020-08-10 | Stop reason: HOSPADM

## 2020-08-07 RX ORDER — CYCLOBENZAPRINE HCL 10 MG
10 TABLET ORAL 3 TIMES DAILY PRN
Status: DISCONTINUED | OUTPATIENT
Start: 2020-08-07 | End: 2020-08-10

## 2020-08-07 RX ORDER — KETOROLAC TROMETHAMINE 15 MG/ML
15 INJECTION, SOLUTION INTRAMUSCULAR; INTRAVENOUS EVERY 6 HOURS
Status: DISCONTINUED | OUTPATIENT
Start: 2020-08-07 | End: 2020-08-10 | Stop reason: HOSPADM

## 2020-08-07 RX ORDER — LORAZEPAM 2 MG/ML
1 INJECTION INTRAMUSCULAR
Status: DISCONTINUED | OUTPATIENT
Start: 2020-08-07 | End: 2020-08-10

## 2020-08-07 RX ORDER — OXYCODONE HYDROCHLORIDE 5 MG/1
5 TABLET ORAL EVERY 4 HOURS PRN
Status: DISCONTINUED | OUTPATIENT
Start: 2020-08-07 | End: 2020-08-10

## 2020-08-07 RX ORDER — SODIUM CHLORIDE 0.9 % (FLUSH) 0.9 %
10 SYRINGE (ML) INJECTION EVERY 12 HOURS SCHEDULED
Status: DISCONTINUED | OUTPATIENT
Start: 2020-08-07 | End: 2020-08-10 | Stop reason: HOSPADM

## 2020-08-07 RX ORDER — LORAZEPAM 1 MG/1
4 TABLET ORAL
Status: DISCONTINUED | OUTPATIENT
Start: 2020-08-07 | End: 2020-08-10

## 2020-08-07 RX ORDER — SODIUM CHLORIDE 0.9 % (FLUSH) 0.9 %
10 SYRINGE (ML) INJECTION PRN
Status: DISCONTINUED | OUTPATIENT
Start: 2020-08-07 | End: 2020-08-10 | Stop reason: HOSPADM

## 2020-08-07 RX ORDER — SODIUM CHLORIDE 0.9 % (FLUSH) 0.9 %
10 SYRINGE (ML) INJECTION EVERY 12 HOURS SCHEDULED
Status: DISCONTINUED | OUTPATIENT
Start: 2020-08-07 | End: 2020-08-07 | Stop reason: SDUPTHER

## 2020-08-07 RX ORDER — LORAZEPAM 1 MG/1
2 TABLET ORAL
Status: DISCONTINUED | OUTPATIENT
Start: 2020-08-07 | End: 2020-08-10

## 2020-08-07 RX ORDER — LIDOCAINE HYDROCHLORIDE 10 MG/ML
20 INJECTION, SOLUTION INFILTRATION; PERINEURAL ONCE
Status: DISCONTINUED | OUTPATIENT
Start: 2020-08-07 | End: 2020-08-08

## 2020-08-07 RX ORDER — DEXTROSE, SODIUM CHLORIDE, SODIUM LACTATE, POTASSIUM CHLORIDE, AND CALCIUM CHLORIDE 5; .6; .31; .03; .02 G/100ML; G/100ML; G/100ML; G/100ML; G/100ML
INJECTION, SOLUTION INTRAVENOUS CONTINUOUS
Status: DISCONTINUED | OUTPATIENT
Start: 2020-08-07 | End: 2020-08-08

## 2020-08-07 RX ORDER — LORAZEPAM 1 MG/1
1 TABLET ORAL
Status: DISCONTINUED | OUTPATIENT
Start: 2020-08-07 | End: 2020-08-10

## 2020-08-07 RX ORDER — NICOTINE 21 MG/24HR
1 PATCH, TRANSDERMAL 24 HOURS TRANSDERMAL ONCE
Status: DISCONTINUED | OUTPATIENT
Start: 2020-08-07 | End: 2020-08-07

## 2020-08-07 RX ORDER — LORAZEPAM 2 MG/ML
3 INJECTION INTRAMUSCULAR
Status: DISCONTINUED | OUTPATIENT
Start: 2020-08-07 | End: 2020-08-10

## 2020-08-07 RX ORDER — LORAZEPAM 2 MG/ML
4 INJECTION INTRAMUSCULAR
Status: DISCONTINUED | OUTPATIENT
Start: 2020-08-07 | End: 2020-08-10

## 2020-08-07 RX ORDER — THIAMINE MONONITRATE (VIT B1) 100 MG
100 TABLET ORAL DAILY
Status: DISCONTINUED | OUTPATIENT
Start: 2020-08-08 | End: 2020-08-10 | Stop reason: HOSPADM

## 2020-08-07 RX ORDER — OXCARBAZEPINE 300 MG/1
300 TABLET, FILM COATED ORAL
Status: DISCONTINUED | OUTPATIENT
Start: 2020-08-07 | End: 2020-08-10 | Stop reason: HOSPADM

## 2020-08-07 RX ORDER — LORAZEPAM 1 MG/1
3 TABLET ORAL
Status: DISCONTINUED | OUTPATIENT
Start: 2020-08-07 | End: 2020-08-10

## 2020-08-07 RX ADMIN — HYDROMORPHONE HYDROCHLORIDE 1 MG: 1 INJECTION, SOLUTION INTRAMUSCULAR; INTRAVENOUS; SUBCUTANEOUS at 12:55

## 2020-08-07 RX ADMIN — HYDROMORPHONE HYDROCHLORIDE 0.5 MG: 1 INJECTION, SOLUTION INTRAMUSCULAR; INTRAVENOUS; SUBCUTANEOUS at 21:56

## 2020-08-07 RX ADMIN — OXCARBAZEPINE 300 MG: 300 TABLET, FILM COATED ORAL at 22:10

## 2020-08-07 RX ADMIN — OXYCODONE 5 MG: 5 TABLET ORAL at 18:49

## 2020-08-07 RX ADMIN — TETANUS TOXOID, REDUCED DIPHTHERIA TOXOID AND ACELLULAR PERTUSSIS VACCINE, ADSORBED 0.5 ML: 5; 2.5; 8; 8; 2.5 SUSPENSION INTRAMUSCULAR at 12:55

## 2020-08-07 RX ADMIN — LIDOCAINE HYDROCHLORIDE: 20 JELLY TOPICAL at 12:00

## 2020-08-07 RX ADMIN — BACITRACIN: 500 OINTMENT TOPICAL at 13:04

## 2020-08-07 RX ADMIN — ACETAMINOPHEN 650 MG: 325 TABLET, FILM COATED ORAL at 20:58

## 2020-08-07 RX ADMIN — ONDANSETRON 4 MG: 2 INJECTION INTRAMUSCULAR; INTRAVENOUS at 21:55

## 2020-08-07 RX ADMIN — Medication 10 ML: at 20:59

## 2020-08-07 RX ADMIN — SODIUM CHLORIDE, SODIUM LACTATE, POTASSIUM CHLORIDE, CALCIUM CHLORIDE AND DEXTROSE MONOHYDRATE: 5; 600; 310; 30; 20 INJECTION, SOLUTION INTRAVENOUS at 21:56

## 2020-08-07 RX ADMIN — KETOROLAC TROMETHAMINE 15 MG: 15 INJECTION, SOLUTION INTRAMUSCULAR; INTRAVENOUS at 21:56

## 2020-08-07 RX ADMIN — ENOXAPARIN SODIUM 30 MG: 30 INJECTION SUBCUTANEOUS at 20:58

## 2020-08-07 ASSESSMENT — PAIN DESCRIPTION - LOCATION
LOCATION: SHOULDER;RIB CAGE
LOCATION: SHOULDER;RIB CAGE

## 2020-08-07 ASSESSMENT — PAIN SCALES - GENERAL
PAINLEVEL_OUTOF10: 5
PAINLEVEL_OUTOF10: 10
PAINLEVEL_OUTOF10: 7
PAINLEVEL_OUTOF10: 8
PAINLEVEL_OUTOF10: 8
PAINLEVEL_OUTOF10: 4
PAINLEVEL_OUTOF10: 2

## 2020-08-07 ASSESSMENT — PAIN DESCRIPTION - DESCRIPTORS: DESCRIPTORS: ACHING;TIGHTNESS

## 2020-08-07 ASSESSMENT — PAIN DESCRIPTION - ORIENTATION: ORIENTATION: RIGHT

## 2020-08-07 ASSESSMENT — PAIN DESCRIPTION - PAIN TYPE: TYPE: ACUTE PAIN

## 2020-08-07 ASSESSMENT — PAIN DESCRIPTION - FREQUENCY: FREQUENCY: CONTINUOUS

## 2020-08-07 NOTE — ED NOTES
Cleaned abrasions  On right arm and lac on right side temple   Applied  Lidocaine akhil Ulloa RN  08/07/20 4530
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
## 2020-08-07 NOTE — ED NOTES
Cleaned abrasions  With hibacleanse and applied  Bacitracin and dressing      Eamon Anderson RN  08/07/20 2584

## 2020-08-08 ENCOUNTER — APPOINTMENT (OUTPATIENT)
Dept: GENERAL RADIOLOGY | Age: 45
DRG: 135 | End: 2020-08-08
Payer: MEDICAID

## 2020-08-08 PROBLEM — S27.0XXA TRAUMATIC FRACTURE OF RIBS WITH PNEUMOTHORAX: Status: ACTIVE | Noted: 2020-08-08

## 2020-08-08 PROBLEM — S22.49XA TRAUMATIC FRACTURE OF RIBS WITH PNEUMOTHORAX: Status: ACTIVE | Noted: 2020-08-08

## 2020-08-08 LAB
ANION GAP SERPL CALCULATED.3IONS-SCNC: 12 MEQ/L (ref 9–15)
BASOPHILS ABSOLUTE: 0.1 K/UL (ref 0–0.2)
BASOPHILS RELATIVE PERCENT: 1.1 %
BUN BLDV-MCNC: 6 MG/DL (ref 6–20)
CALCIUM SERPL-MCNC: 8.8 MG/DL (ref 8.5–9.9)
CHLORIDE BLD-SCNC: 92 MEQ/L (ref 95–107)
CO2: 26 MEQ/L (ref 20–31)
CREAT SERPL-MCNC: 0.59 MG/DL (ref 0.7–1.2)
EOSINOPHILS ABSOLUTE: 0 K/UL (ref 0–0.7)
EOSINOPHILS RELATIVE PERCENT: 1 %
GFR AFRICAN AMERICAN: >60
GFR NON-AFRICAN AMERICAN: >60
GLUCOSE BLD-MCNC: 97 MG/DL (ref 70–99)
HCT VFR BLD CALC: 38.1 % (ref 42–52)
HEMOGLOBIN: 13.4 G/DL (ref 14–18)
LYMPHOCYTES ABSOLUTE: 1 K/UL (ref 1–4.8)
LYMPHOCYTES RELATIVE PERCENT: 22.4 %
MAGNESIUM: 2 MG/DL (ref 1.7–2.4)
MCH RBC QN AUTO: 35.3 PG (ref 27–31.3)
MCHC RBC AUTO-ENTMCNC: 35.2 % (ref 33–37)
MCV RBC AUTO: 100.2 FL (ref 80–100)
MONOCYTES ABSOLUTE: 0.5 K/UL (ref 0.2–0.8)
MONOCYTES RELATIVE PERCENT: 11 %
NEUTROPHILS ABSOLUTE: 3 K/UL (ref 1.4–6.5)
NEUTROPHILS RELATIVE PERCENT: 64.5 %
PDW BLD-RTO: 13.5 % (ref 11.5–14.5)
PHOSPHORUS: 5 MG/DL (ref 2.3–4.8)
PLATELET # BLD: 148 K/UL (ref 130–400)
POTASSIUM SERPL-SCNC: 3.8 MEQ/L (ref 3.4–4.9)
RBC # BLD: 3.81 M/UL (ref 4.7–6.1)
SODIUM BLD-SCNC: 130 MEQ/L (ref 135–144)
WBC # BLD: 4.6 K/UL (ref 4.8–10.8)

## 2020-08-08 PROCEDURE — 84100 ASSAY OF PHOSPHORUS: CPT

## 2020-08-08 PROCEDURE — 80048 BASIC METABOLIC PNL TOTAL CA: CPT

## 2020-08-08 PROCEDURE — 97530 THERAPEUTIC ACTIVITIES: CPT

## 2020-08-08 PROCEDURE — 6360000002 HC RX W HCPCS: Performed by: SURGERY

## 2020-08-08 PROCEDURE — 96372 THER/PROPH/DIAG INJ SC/IM: CPT

## 2020-08-08 PROCEDURE — 6360000002 HC RX W HCPCS: Performed by: PHYSICIAN ASSISTANT

## 2020-08-08 PROCEDURE — 71045 X-RAY EXAM CHEST 1 VIEW: CPT

## 2020-08-08 PROCEDURE — 1210000000 HC MED SURG R&B

## 2020-08-08 PROCEDURE — 83735 ASSAY OF MAGNESIUM: CPT

## 2020-08-08 PROCEDURE — 6370000000 HC RX 637 (ALT 250 FOR IP): Performed by: SURGERY

## 2020-08-08 PROCEDURE — 36415 COLL VENOUS BLD VENIPUNCTURE: CPT

## 2020-08-08 PROCEDURE — 96376 TX/PRO/DX INJ SAME DRUG ADON: CPT

## 2020-08-08 PROCEDURE — 99233 SBSQ HOSP IP/OBS HIGH 50: CPT | Performed by: SURGERY

## 2020-08-08 PROCEDURE — 85025 COMPLETE CBC W/AUTO DIFF WBC: CPT

## 2020-08-08 PROCEDURE — 6370000000 HC RX 637 (ALT 250 FOR IP): Performed by: PHYSICIAN ASSISTANT

## 2020-08-08 PROCEDURE — 2580000003 HC RX 258: Performed by: SURGERY

## 2020-08-08 PROCEDURE — 2700000000 HC OXYGEN THERAPY PER DAY

## 2020-08-08 PROCEDURE — 97166 OT EVAL MOD COMPLEX 45 MIN: CPT

## 2020-08-08 RX ORDER — DOCUSATE SODIUM 100 MG/1
100 CAPSULE, LIQUID FILLED ORAL 2 TIMES DAILY
Status: DISCONTINUED | OUTPATIENT
Start: 2020-08-08 | End: 2020-08-10 | Stop reason: HOSPADM

## 2020-08-08 RX ORDER — LIDOCAINE 4 G/G
2 PATCH TOPICAL DAILY
Status: DISCONTINUED | OUTPATIENT
Start: 2020-08-08 | End: 2020-08-10 | Stop reason: HOSPADM

## 2020-08-08 RX ORDER — BACITRACIN, NEOMYCIN, POLYMYXIN B 400; 3.5; 5 [USP'U]/G; MG/G; [USP'U]/G
OINTMENT TOPICAL DAILY
Status: DISCONTINUED | OUTPATIENT
Start: 2020-08-08 | End: 2020-08-10 | Stop reason: HOSPADM

## 2020-08-08 RX ORDER — SENNA PLUS 8.6 MG/1
1 TABLET ORAL NIGHTLY
Status: DISCONTINUED | OUTPATIENT
Start: 2020-08-08 | End: 2020-08-10 | Stop reason: HOSPADM

## 2020-08-08 RX ADMIN — ENOXAPARIN SODIUM 30 MG: 30 INJECTION SUBCUTANEOUS at 22:22

## 2020-08-08 RX ADMIN — Medication 10 ML: at 22:22

## 2020-08-08 RX ADMIN — ENOXAPARIN SODIUM 30 MG: 30 INJECTION SUBCUTANEOUS at 09:19

## 2020-08-08 RX ADMIN — ACETAMINOPHEN 650 MG: 325 TABLET, FILM COATED ORAL at 04:15

## 2020-08-08 RX ADMIN — DOCUSATE SODIUM 100 MG: 100 CAPSULE, LIQUID FILLED ORAL at 22:21

## 2020-08-08 RX ADMIN — Medication 10 ML: at 09:31

## 2020-08-08 RX ADMIN — OXYCODONE 5 MG: 5 TABLET ORAL at 14:59

## 2020-08-08 RX ADMIN — OXYCODONE 5 MG: 5 TABLET ORAL at 09:19

## 2020-08-08 RX ADMIN — Medication 8.6 MG: at 22:21

## 2020-08-08 RX ADMIN — KETOROLAC TROMETHAMINE 15 MG: 15 INJECTION, SOLUTION INTRAMUSCULAR; INTRAVENOUS at 04:15

## 2020-08-08 RX ADMIN — ACETAMINOPHEN 650 MG: 325 TABLET, FILM COATED ORAL at 09:17

## 2020-08-08 RX ADMIN — OXYCODONE 5 MG: 5 TABLET ORAL at 21:23

## 2020-08-08 RX ADMIN — KETOROLAC TROMETHAMINE 15 MG: 15 INJECTION, SOLUTION INTRAMUSCULAR; INTRAVENOUS at 16:16

## 2020-08-08 RX ADMIN — ACETAMINOPHEN 650 MG: 325 TABLET, FILM COATED ORAL at 22:21

## 2020-08-08 RX ADMIN — OXCARBAZEPINE 300 MG: 300 TABLET, FILM COATED ORAL at 22:21

## 2020-08-08 RX ADMIN — ACETAMINOPHEN 650 MG: 325 TABLET, FILM COATED ORAL at 01:06

## 2020-08-08 RX ADMIN — BACITRACIN ZINC, NEOMYCIN, POLYMYXIN B 1 G: 400; 3.5; 5 OINTMENT TOPICAL at 12:29

## 2020-08-08 RX ADMIN — KETOROLAC TROMETHAMINE 15 MG: 15 INJECTION, SOLUTION INTRAMUSCULAR; INTRAVENOUS at 09:20

## 2020-08-08 RX ADMIN — DOCUSATE SODIUM 100 MG: 100 CAPSULE, LIQUID FILLED ORAL at 12:28

## 2020-08-08 RX ADMIN — ACETAMINOPHEN 650 MG: 325 TABLET, FILM COATED ORAL at 16:13

## 2020-08-08 RX ADMIN — OXCARBAZEPINE 300 MG: 300 TABLET, FILM COATED ORAL at 12:28

## 2020-08-08 RX ADMIN — FOLIC ACID 1 MG: 1 TABLET ORAL at 09:19

## 2020-08-08 RX ADMIN — ACETAMINOPHEN 650 MG: 325 TABLET, FILM COATED ORAL at 12:26

## 2020-08-08 RX ADMIN — Medication 100 MG: at 09:19

## 2020-08-08 RX ADMIN — KETOROLAC TROMETHAMINE 15 MG: 15 INJECTION, SOLUTION INTRAMUSCULAR; INTRAVENOUS at 22:22

## 2020-08-08 ASSESSMENT — PAIN SCALES - GENERAL
PAINLEVEL_OUTOF10: 3
PAINLEVEL_OUTOF10: 4
PAINLEVEL_OUTOF10: 6
PAINLEVEL_OUTOF10: 5
PAINLEVEL_OUTOF10: 4
PAINLEVEL_OUTOF10: 5
PAINLEVEL_OUTOF10: 5
PAINLEVEL_OUTOF10: 4

## 2020-08-08 ASSESSMENT — PAIN DESCRIPTION - LOCATION
LOCATION: ARM
LOCATION: SHOULDER;RIB CAGE

## 2020-08-08 ASSESSMENT — PAIN DESCRIPTION - ORIENTATION: ORIENTATION: RIGHT
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
## 2020-08-08 NOTE — DISCHARGE SUMMARY
[address]  [address]  [place], [address] Franklin Barron  MRN: [id number]  YOB: 1975  38 y.o.male      Attending  Jl Bonilla MD ?   Date of Admission  8/7/2020 Date of Discharge  08/10/2020      ? DIAGNOSES:  Principal Problem:    Pneumothorax, traumatic  Active Problems:    Acute pain due to trauma    Closed traumatic nondisplaced fracture of multiple ribs of right side with routine healing    Closed nondisplaced fracture of acromial process of right scapula with routine healing    Hyponatremia    Abrasion of right arm    Scalp laceration, initial encounter  Resolved Problems:    * No resolved hospital problems. *         PROCEDURES:  None  ? DISCHARGE MEDICATIONS:    Daily Sims   Home Medication Instructions XTO:[id number]    Printed on:08/10/20 1661   Medication Information                      acetaminophen (TYLENOL) 325 MG tablet  Take 2 tablets by mouth every 4 hours as needed for Pain (For mild to moderat pain. (Pain 1-6 out of 10 on pain scale))             docusate sodium (COLACE, DULCOLAX) 100 MG CAPS  Take 100 mg by mouth 2 times daily To prevent and treat constipation while you are taking pain medications. folic acid (FOLVITE) 1 MG tablet  Take 1 tablet by mouth daily             ibuprofen (ADVIL;MOTRIN) 600 MG tablet  Take 1 tablet by mouth every 6 hours as needed for Pain (Take with Tylenol, if you have persistent pain in ribs or R shoulder.)             OXcarbazepine (TRILEPTAL) 300 MG tablet  Take 1 tablet by mouth 2 times daily             oxyCODONE (ROXICODONE) 5 MG immediate release tablet  Take 1 tablet by mouth every 6 hours as needed for Pain for up to 3 days. senna (SENOKOT) 8.6 MG tablet  Take 1 tablet by mouth nightly To prevent and treat constipation while you are taking pain medications.              vitamin B-1 100 MG tablet  Take 1 tablet by mouth daily                  REASON FOR HOSPITALIZATION:  Soy anguiano up in bed. Awake, alert, and in no acute distress  HEENT: Atraumatic normocephalic. Cardiovascular: Regular rate and rhythm. Pulmonary: Clear to ausculation bilaterally. No wheezing, rhonchi or rales. IS of 2500. Breathing easily on RA. Abdominal: Soft. Non-distended. Non-tender. Musculoskeletal: Good ROM in all extremities, with exception to RUE which has limited should extension d/t pain. No edema. Neurological: Alert, awake, and orientated x3. Motor and sensory grossly intact. No R hand sensory/motir deficits. No focal deficits. GCS of 15. Skin: warm and dry. A        ANTICIPATED FOLLOW UP:  No future appointments. Discharge Procedure Orders   XR SHOULDER RIGHT (MIN 2 VIEWS)   Standing Status: Future Standing Exp. Date: 02/10/21   Scheduling Instructions: Pls schedule to take place just prior to follow up appointment with Orthopedics. Order Specific Question Answer Comments   Reason for exam: Follow up imaging for R acromial fx      XR CHEST STANDARD (2 VW)   Standing Status: Future Standing Exp. Date: 08/10/21   Scheduling Instructions: Please complete just prior to follow-up appointment with trauma surgery (Scheduled for 8/19/2020). Order Specific Question Answer Comments   Reason for exam: Follow up imaging for R pneumothorax. Other indicated follow up and instructions for scheduling:  - Follow up with Orthopedic Surgery (Dr. Randa Christie) in 2-3 weeks with repeat R shoulder XR.  - Follow up with Trauma in 1 week (on 8/19) with repeat CXR for post-trauma f/u and head staple removal.      VTE RISK AT DISCHARGE:  Per trauma program protocol, the patient does not require post-discharge VTE prophylaxis.     --  Sandrea Riedel, PA-C  Trauma/Critical Care  Emergency General Surgery  [phone] (5g-3R) [phone]      >30 minutes was spent on the discharge of this patient including final examination of the patient, discussion of the hospital stay, instructions for continuing care to all relevant caregivers, preparation of discharge records, prescriptions and referral forms, and clear identification of reasons to return to clinic or to emergency room.
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
## 2020-08-08 NOTE — PROGRESS NOTES
930-- assessment completed, patient alert and oriented x 4, complaining of right shoulder pain with movement, and severe right side pain with a cough, pain medication given as ordered, lungs clear but diminished on the right side, oxygen on at 2L nasal cannula, heart rate regular, bowel sounds active, right arm with strong radial pulse, good movement and sensations in the fingers, patient aware that he should not lift the right arm at the shoulder but is able to bend at the elbow and wrist, right side of the head with staples intact, no drainage noted, patient has a right arm sling on currently

## 2020-08-08 NOTE — PROGRESS NOTES
Physical Therapy  Facility/Department: [facility] [id number]/[id number]      PT evaluation attempted 8/8/20, at 7:58 AM EDT, however this patient status is currently observation. Per facility protocol, PT evaluation and treatment orders for patients in observation status must include a PT specific diagnosis (i.e. \"difficulty ambulating\", \"lack of coordination\", etc.) These order specifications may be added to the \"comments\" section of the PT evaluation and treatment order. Requested updated Physical Therapy orders from referring provider via \"sticky note\". Coordination team notified.      We thank you for your referral!    [address],  [facility] Physical Therapy Department    Electronically signed by Gely Belcher PT on 8/8/20 at 7:58 AM EDT
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
## 2020-08-08 NOTE — PROGRESS NOTES
Trauma Interval Update    Planned evening cxr with increased size of R ptx. Reviewed images with patient  Recommended chest tube secondary to size, and persistent increase  PT relates that his uncle just  from \"a tube put in his liver, that he bled from\"  He prefers to wait and watch, but would like the  of family members     Risks and benefits of tube vs expectant monitoring including bleeding, prolonged need for chest tube/admission, lung collapse, cardiac collapse discussed. Pt also complained of nausea and hot flashes  After I left the room the patient disclosed to the nurse that he drinks about a 6 pack per day. Add thiamine, folate, CIWA. Check labs now. Without other complaints including abdominal pain (20% risk of solid organ injury with rib fractures)    Nursing team updated with plan. Chest tube supplies at bedside should he change his mind  otherwise will plan for cxr in 6 hours. 2L NC for potential resorption. Discussed symptoms to notify nurse. Addendum [id number]    Repeat cxr reviewed  R ptx appears stable, given slight variations in projection/rotation  Vitals remain acceptable/improved. Clinically unchanged  Continue to monitor  Repeat cxr this afternoon.     Faraz Dowling

## 2020-08-08 NOTE — PROGRESS NOTES
Patient alert and oriented x4. Lungs clear, bowel sounds present. No edema. Up independently. Complaints of pain to right shoulder and chest, scheduled tylenol and toradol given. Trauma physician, Jessi Gallagher, at bedside discussing possible chest tube placement. Patient is unsure and wants to wait for the next chest x ray results. Repeat chest x ray scheduled for 0300. Patient is nauseous and states that he was starting to get the shakes earlier, states that he drinks a six pack of beer daily. Zofran ordered and given. CIWA score 7. D5LR infusing at 50 mL/hr. Placed on 2L nasal cannula. Will monitor.

## 2020-08-09 ENCOUNTER — APPOINTMENT (OUTPATIENT)
Dept: GENERAL RADIOLOGY | Age: 45
DRG: 135 | End: 2020-08-09
Payer: MEDICAID

## 2020-08-09 VITALS
TEMPERATURE: 98.2 F | HEIGHT: 67 IN | DIASTOLIC BLOOD PRESSURE: 93 MMHG | SYSTOLIC BLOOD PRESSURE: 136 MMHG | HEART RATE: 82 BPM | WEIGHT: 127 LBS | OXYGEN SATURATION: 100 % | RESPIRATION RATE: 17 BRPM | BODY MASS INDEX: 19.93 KG/M2

## 2020-08-09 LAB
ANION GAP SERPL CALCULATED.3IONS-SCNC: 8 MEQ/L (ref 9–15)
BASOPHILS ABSOLUTE: 0.1 K/UL (ref 0–0.2)
BASOPHILS RELATIVE PERCENT: 1.1 %
BUN BLDV-MCNC: 11 MG/DL (ref 6–20)
CALCIUM SERPL-MCNC: 8.6 MG/DL (ref 8.5–9.9)
CHLORIDE BLD-SCNC: 90 MEQ/L (ref 95–107)
CO2: 30 MEQ/L (ref 20–31)
CREAT SERPL-MCNC: 0.59 MG/DL (ref 0.7–1.2)
EOSINOPHILS ABSOLUTE: 0.2 K/UL (ref 0–0.7)
EOSINOPHILS RELATIVE PERCENT: 3.9 %
GFR AFRICAN AMERICAN: >60
GFR NON-AFRICAN AMERICAN: >60
GLUCOSE BLD-MCNC: 94 MG/DL (ref 70–99)
HCT VFR BLD CALC: 40.8 % (ref 42–52)
HEMOGLOBIN: 14.3 G/DL (ref 14–18)
LYMPHOCYTES ABSOLUTE: 1.3 K/UL (ref 1–4.8)
LYMPHOCYTES RELATIVE PERCENT: 26.6 %
MAGNESIUM: 2 MG/DL (ref 1.7–2.4)
MCH RBC QN AUTO: 35.4 PG (ref 27–31.3)
MCHC RBC AUTO-ENTMCNC: 35 % (ref 33–37)
MCV RBC AUTO: 100.9 FL (ref 80–100)
MONOCYTES ABSOLUTE: 0.6 K/UL (ref 0.2–0.8)
MONOCYTES RELATIVE PERCENT: 11.5 %
NEUTROPHILS ABSOLUTE: 2.8 K/UL (ref 1.4–6.5)
NEUTROPHILS RELATIVE PERCENT: 56.9 %
PDW BLD-RTO: 13.3 % (ref 11.5–14.5)
PHOSPHORUS: 4.9 MG/DL (ref 2.3–4.8)
PLATELET # BLD: 159 K/UL (ref 130–400)
POTASSIUM SERPL-SCNC: 4.5 MEQ/L (ref 3.4–4.9)
RBC # BLD: 4.04 M/UL (ref 4.7–6.1)
SODIUM BLD-SCNC: 128 MEQ/L (ref 135–144)
WBC # BLD: 4.9 K/UL (ref 4.8–10.8)

## 2020-08-09 PROCEDURE — 2580000003 HC RX 258: Performed by: PHYSICIAN ASSISTANT

## 2020-08-09 PROCEDURE — 6370000000 HC RX 637 (ALT 250 FOR IP): Performed by: SURGERY

## 2020-08-09 PROCEDURE — 71046 X-RAY EXAM CHEST 2 VIEWS: CPT

## 2020-08-09 PROCEDURE — 2580000003 HC RX 258: Performed by: SURGERY

## 2020-08-09 PROCEDURE — 80048 BASIC METABOLIC PNL TOTAL CA: CPT

## 2020-08-09 PROCEDURE — 1210000000 HC MED SURG R&B

## 2020-08-09 PROCEDURE — 84100 ASSAY OF PHOSPHORUS: CPT

## 2020-08-09 PROCEDURE — 6360000002 HC RX W HCPCS: Performed by: SURGERY

## 2020-08-09 PROCEDURE — 99232 SBSQ HOSP IP/OBS MODERATE 35: CPT | Performed by: SURGERY

## 2020-08-09 PROCEDURE — 6370000000 HC RX 637 (ALT 250 FOR IP): Performed by: PHYSICIAN ASSISTANT

## 2020-08-09 PROCEDURE — 83735 ASSAY OF MAGNESIUM: CPT

## 2020-08-09 PROCEDURE — 6360000002 HC RX W HCPCS: Performed by: PHYSICIAN ASSISTANT

## 2020-08-09 PROCEDURE — 2700000000 HC OXYGEN THERAPY PER DAY

## 2020-08-09 PROCEDURE — 85025 COMPLETE CBC W/AUTO DIFF WBC: CPT

## 2020-08-09 PROCEDURE — 36415 COLL VENOUS BLD VENIPUNCTURE: CPT

## 2020-08-09 RX ORDER — 0.9 % SODIUM CHLORIDE 0.9 %
1000 INTRAVENOUS SOLUTION INTRAVENOUS ONCE
Status: COMPLETED | OUTPATIENT
Start: 2020-08-09 | End: 2020-08-09

## 2020-08-09 RX ADMIN — OXCARBAZEPINE 300 MG: 300 TABLET, FILM COATED ORAL at 22:01

## 2020-08-09 RX ADMIN — OXYCODONE 5 MG: 5 TABLET ORAL at 20:49

## 2020-08-09 RX ADMIN — Medication 10 ML: at 19:58

## 2020-08-09 RX ADMIN — ACETAMINOPHEN 650 MG: 325 TABLET, FILM COATED ORAL at 13:03

## 2020-08-09 RX ADMIN — Medication 10 ML: at 09:26

## 2020-08-09 RX ADMIN — DOCUSATE SODIUM 100 MG: 100 CAPSULE, LIQUID FILLED ORAL at 09:19

## 2020-08-09 RX ADMIN — BACITRACIN ZINC, NEOMYCIN, POLYMYXIN B 1 G: 400; 3.5; 5 OINTMENT TOPICAL at 09:27

## 2020-08-09 RX ADMIN — ENOXAPARIN SODIUM 30 MG: 30 INJECTION SUBCUTANEOUS at 19:52

## 2020-08-09 RX ADMIN — Medication 8.6 MG: at 19:52

## 2020-08-09 RX ADMIN — KETOROLAC TROMETHAMINE 15 MG: 15 INJECTION, SOLUTION INTRAMUSCULAR; INTRAVENOUS at 16:48

## 2020-08-09 RX ADMIN — OXYCODONE 5 MG: 5 TABLET ORAL at 09:20

## 2020-08-09 RX ADMIN — ACETAMINOPHEN 650 MG: 325 TABLET, FILM COATED ORAL at 04:28

## 2020-08-09 RX ADMIN — ACETAMINOPHEN 650 MG: 325 TABLET, FILM COATED ORAL at 09:18

## 2020-08-09 RX ADMIN — KETOROLAC TROMETHAMINE 15 MG: 15 INJECTION, SOLUTION INTRAMUSCULAR; INTRAVENOUS at 09:30

## 2020-08-09 RX ADMIN — OXCARBAZEPINE 300 MG: 300 TABLET, FILM COATED ORAL at 12:08

## 2020-08-09 RX ADMIN — DOCUSATE SODIUM 100 MG: 100 CAPSULE, LIQUID FILLED ORAL at 19:52

## 2020-08-09 RX ADMIN — OXYCODONE 5 MG: 5 TABLET ORAL at 16:49

## 2020-08-09 RX ADMIN — ACETAMINOPHEN 650 MG: 325 TABLET, FILM COATED ORAL at 19:53

## 2020-08-09 RX ADMIN — ENOXAPARIN SODIUM 30 MG: 30 INJECTION SUBCUTANEOUS at 09:20

## 2020-08-09 RX ADMIN — FOLIC ACID 1 MG: 1 TABLET ORAL at 09:18

## 2020-08-09 RX ADMIN — ACETAMINOPHEN 650 MG: 325 TABLET, FILM COATED ORAL at 16:50

## 2020-08-09 RX ADMIN — KETOROLAC TROMETHAMINE 15 MG: 15 INJECTION, SOLUTION INTRAMUSCULAR; INTRAVENOUS at 04:29

## 2020-08-09 RX ADMIN — Medication 100 MG: at 09:18

## 2020-08-09 RX ADMIN — SODIUM CHLORIDE 1000 ML: 9 INJECTION, SOLUTION INTRAVENOUS at 09:24

## 2020-08-09 RX ADMIN — KETOROLAC TROMETHAMINE 15 MG: 15 INJECTION, SOLUTION INTRAMUSCULAR; INTRAVENOUS at 22:00

## 2020-08-09 ASSESSMENT — PAIN SCALES - GENERAL
PAINLEVEL_OUTOF10: 4
PAINLEVEL_OUTOF10: 3
PAINLEVEL_OUTOF10: 0
PAINLEVEL_OUTOF10: 5
PAINLEVEL_OUTOF10: 7
PAINLEVEL_OUTOF10: 5
PAINLEVEL_OUTOF10: 3
PAINLEVEL_OUTOF10: 4
PAINLEVEL_OUTOF10: 7
PAINLEVEL_OUTOF10: 5
PAINLEVEL_OUTOF10: 0

## 2020-08-09 ASSESSMENT — PAIN DESCRIPTION - ORIENTATION: ORIENTATION: RIGHT

## 2020-08-09 ASSESSMENT — PAIN DESCRIPTION - LOCATION
LOCATION: SHOULDER;RIB CAGE
LOCATION: SHOULDER
LOCATION: SHOULDER;RIB CAGE

## 2020-08-09 ASSESSMENT — PAIN DESCRIPTION - DESCRIPTORS: DESCRIPTORS: ACHING;TENDER

## 2020-08-09 ASSESSMENT — PAIN DESCRIPTION - PAIN TYPE: TYPE: ACUTE PAIN

## 2020-08-09 ASSESSMENT — PAIN DESCRIPTION - FREQUENCY: FREQUENCY: CONTINUOUS

## 2020-08-09 NOTE — CARE COORDINATION
Met with patient at the bedside. He plans to discharge to home tomorrow, denies any needs at this time. Patient is to have a chest XR in the AM.  Per Trauma team, if continues to show improvement/no decompensation patient can return to home. Contact Case Management if any additional needs are identified prior to discharge.  Electronically signed by Kinza Adams RN on 8/9/2020 at 4:29 PM
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
## 2020-08-10 ENCOUNTER — APPOINTMENT (OUTPATIENT)
Dept: GENERAL RADIOLOGY | Age: 45
DRG: 135 | End: 2020-08-10
Payer: MEDICAID

## 2020-08-10 PROBLEM — S01.01XA SCALP LACERATION, INITIAL ENCOUNTER: Status: ACTIVE | Noted: 2020-08-10

## 2020-08-10 PROBLEM — E87.1 HYPONATREMIA: Status: ACTIVE | Noted: 2020-08-10

## 2020-08-10 PROBLEM — S40.811A ABRASION OF RIGHT ARM: Status: ACTIVE | Noted: 2020-08-10

## 2020-08-10 LAB
ANION GAP SERPL CALCULATED.3IONS-SCNC: 8 MEQ/L (ref 9–15)
BUN BLDV-MCNC: 9 MG/DL (ref 6–20)
CALCIUM SERPL-MCNC: 8.2 MG/DL (ref 8.5–9.9)
CHLORIDE BLD-SCNC: 91 MEQ/L (ref 95–107)
CO2: 27 MEQ/L (ref 20–31)
CREAT SERPL-MCNC: 0.49 MG/DL (ref 0.7–1.2)
GFR AFRICAN AMERICAN: >60
GFR NON-AFRICAN AMERICAN: >60
GLUCOSE BLD-MCNC: 89 MG/DL (ref 70–99)
MAGNESIUM: 1.9 MG/DL (ref 1.7–2.4)
PHOSPHORUS: 3.9 MG/DL (ref 2.3–4.8)
POTASSIUM SERPL-SCNC: 4.5 MEQ/L (ref 3.4–4.9)
SODIUM BLD-SCNC: 126 MEQ/L (ref 135–144)

## 2020-08-10 PROCEDURE — 2580000003 HC RX 258: Performed by: SURGERY

## 2020-08-10 PROCEDURE — 6370000000 HC RX 637 (ALT 250 FOR IP): Performed by: SURGERY

## 2020-08-10 PROCEDURE — 99239 HOSP IP/OBS DSCHRG MGMT >30: CPT | Performed by: PHYSICIAN ASSISTANT

## 2020-08-10 PROCEDURE — 2700000000 HC OXYGEN THERAPY PER DAY

## 2020-08-10 PROCEDURE — 83735 ASSAY OF MAGNESIUM: CPT

## 2020-08-10 PROCEDURE — 6370000000 HC RX 637 (ALT 250 FOR IP): Performed by: PHYSICIAN ASSISTANT

## 2020-08-10 PROCEDURE — 80048 BASIC METABOLIC PNL TOTAL CA: CPT

## 2020-08-10 PROCEDURE — 84100 ASSAY OF PHOSPHORUS: CPT

## 2020-08-10 PROCEDURE — 6360000002 HC RX W HCPCS: Performed by: SURGERY

## 2020-08-10 PROCEDURE — 36415 COLL VENOUS BLD VENIPUNCTURE: CPT

## 2020-08-10 PROCEDURE — 71045 X-RAY EXAM CHEST 1 VIEW: CPT

## 2020-08-10 RX ORDER — ACETAMINOPHEN 325 MG/1
650 TABLET ORAL EVERY 4 HOURS PRN
Qty: 120 TABLET | Refills: 0 | Status: SHIPPED | OUTPATIENT
Start: 2020-08-10 | End: 2020-11-04 | Stop reason: ALTCHOICE

## 2020-08-10 RX ORDER — IBUPROFEN 600 MG/1
600 TABLET ORAL EVERY 6 HOURS PRN
Qty: 60 TABLET | Refills: 0 | Status: SHIPPED | OUTPATIENT
Start: 2020-08-10 | End: 2021-11-30 | Stop reason: ALTCHOICE

## 2020-08-10 RX ORDER — PSEUDOEPHEDRINE HCL 30 MG
100 TABLET ORAL 2 TIMES DAILY
Qty: 30 CAPSULE | Refills: 0 | Status: SHIPPED | OUTPATIENT
Start: 2020-08-10 | End: 2020-11-04 | Stop reason: ALTCHOICE

## 2020-08-10 RX ORDER — SENNA PLUS 8.6 MG/1
1 TABLET ORAL NIGHTLY
Qty: 15 TABLET | Refills: 0 | Status: SHIPPED | OUTPATIENT
Start: 2020-08-10 | End: 2020-09-09

## 2020-08-10 RX ORDER — OXYCODONE HYDROCHLORIDE 5 MG/1
5 TABLET ORAL EVERY 6 HOURS PRN
Status: DISCONTINUED | OUTPATIENT
Start: 2020-08-10 | End: 2020-08-10 | Stop reason: HOSPADM

## 2020-08-10 RX ORDER — FOLIC ACID 1 MG/1
1 TABLET ORAL DAILY
Qty: 30 TABLET | Refills: 0 | Status: SHIPPED | OUTPATIENT
Start: 2020-08-11 | End: 2021-03-18 | Stop reason: ALTCHOICE

## 2020-08-10 RX ORDER — OXYCODONE HYDROCHLORIDE 5 MG/1
5 TABLET ORAL EVERY 6 HOURS PRN
Qty: 12 TABLET | Refills: 0 | Status: SHIPPED | OUTPATIENT
Start: 2020-08-10 | End: 2020-08-13

## 2020-08-10 RX ORDER — LANOLIN ALCOHOL/MO/W.PET/CERES
100 CREAM (GRAM) TOPICAL DAILY
Qty: 30 TABLET | Refills: 0 | Status: SHIPPED | OUTPATIENT
Start: 2020-08-11 | End: 2020-11-04

## 2020-08-10 RX ADMIN — KETOROLAC TROMETHAMINE 15 MG: 15 INJECTION, SOLUTION INTRAMUSCULAR; INTRAVENOUS at 10:22

## 2020-08-10 RX ADMIN — OXCARBAZEPINE 300 MG: 300 TABLET, FILM COATED ORAL at 10:22

## 2020-08-10 RX ADMIN — ACETAMINOPHEN 650 MG: 325 TABLET, FILM COATED ORAL at 10:30

## 2020-08-10 RX ADMIN — OXYCODONE 5 MG: 5 TABLET ORAL at 10:20

## 2020-08-10 RX ADMIN — Medication 100 MG: at 10:18

## 2020-08-10 RX ADMIN — BACITRACIN ZINC, NEOMYCIN, POLYMYXIN B: 400; 3.5; 5 OINTMENT TOPICAL at 10:19

## 2020-08-10 RX ADMIN — Medication 10 ML: at 10:19

## 2020-08-10 RX ADMIN — DOCUSATE SODIUM 100 MG: 100 CAPSULE, LIQUID FILLED ORAL at 10:17

## 2020-08-10 RX ADMIN — ACETAMINOPHEN 650 MG: 325 TABLET, FILM COATED ORAL at 00:20

## 2020-08-10 RX ADMIN — KETOROLAC TROMETHAMINE 15 MG: 15 INJECTION, SOLUTION INTRAMUSCULAR; INTRAVENOUS at 04:21

## 2020-08-10 RX ADMIN — FOLIC ACID 1 MG: 1 TABLET ORAL at 10:17

## 2020-08-10 ASSESSMENT — PAIN SCALES - GENERAL
PAINLEVEL_OUTOF10: 4

## 2020-08-10 ASSESSMENT — PAIN DESCRIPTION - DESCRIPTORS: DESCRIPTORS: ACHING;TENDER

## 2020-08-10 ASSESSMENT — PAIN DESCRIPTION - LOCATION: LOCATION: SHOULDER

## 2020-08-10 ASSESSMENT — PAIN DESCRIPTION - ORIENTATION: ORIENTATION: RIGHT

## 2020-08-10 ASSESSMENT — PAIN DESCRIPTION - PAIN TYPE: TYPE: ACUTE PAIN

## 2020-08-10 NOTE — PROGRESS NOTES
1036-- assessment completed, patient alert and oriented x 4, complaining of right side chest discomfort 4/10 on scale, pain medications given as ordered, lungs clear, room air, encouraged his I.S., heart rate regular, bowel sounds active, last bm 8-10, right side of head with 5 staples intact, attempted to clean the area of the dried blood without success, antibiotic ointment applied, instructed the patient on showering when he gets home, not to soak in a tub or hot tub and submerge his head due to possible bacteria, patient verbalized his understanding of this, the patient is anticipating discharge to home today

## 2020-08-10 NOTE — PROGRESS NOTES
Patient alert and oriented x4. Lungs clear, no edema. Abrasions to right arm, side, and lower leg that are scabbed over. Up independently. Wears sling while up. Complaints of pain to shoulder and rib cage, medicated with prn oxycodone. No other needs at this time. Will monitor.

## 2020-08-10 NOTE — PLAN OF CARE
Problem: Pain:  Goal: Pain level will decrease  Description: Pain level will decrease  8/10/2020 1100 by Jayant Beckwith RN  Outcome: Ongoing  8/9/2020 2113 by Alycia Severance, RN  Outcome: Ongoing  Goal: Control of acute pain  Description: Control of acute pain  8/10/2020 1100 by Jayant Beckwith RN  Outcome: Ongoing  8/9/2020 2113 by Alycia Severance, RN  Outcome: Ongoing  Goal: Control of chronic pain  Description: Control of chronic pain  8/10/2020 1100 by Jayant Beckwith RN  Outcome: Ongoing  8/9/2020 2113 by Alycia Severance, RN  Outcome: Ongoing

## 2020-08-14 ENCOUNTER — OFFICE VISIT (OUTPATIENT)
Dept: FAMILY MEDICINE CLINIC | Age: 45
End: 2020-08-14
Payer: MEDICAID

## 2020-08-14 VITALS
WEIGHT: 133 LBS | HEART RATE: 103 BPM | HEIGHT: 67 IN | SYSTOLIC BLOOD PRESSURE: 150 MMHG | DIASTOLIC BLOOD PRESSURE: 90 MMHG | BODY MASS INDEX: 20.88 KG/M2 | OXYGEN SATURATION: 97 % | TEMPERATURE: 98.2 F

## 2020-08-14 PROBLEM — S01.01XA SCALP LACERATION, INITIAL ENCOUNTER: Status: RESOLVED | Noted: 2020-08-10 | Resolved: 2020-08-14

## 2020-08-14 PROCEDURE — 4004F PT TOBACCO SCREEN RCVD TLK: CPT | Performed by: FAMILY MEDICINE

## 2020-08-14 PROCEDURE — G8420 CALC BMI NORM PARAMETERS: HCPCS | Performed by: FAMILY MEDICINE

## 2020-08-14 PROCEDURE — 99214 OFFICE O/P EST MOD 30 MIN: CPT | Performed by: FAMILY MEDICINE

## 2020-08-14 PROCEDURE — 1111F DSCHRG MED/CURRENT MED MERGE: CPT | Performed by: FAMILY MEDICINE

## 2020-08-14 PROCEDURE — G8427 DOCREV CUR MEDS BY ELIG CLIN: HCPCS | Performed by: FAMILY MEDICINE

## 2020-08-14 ASSESSMENT — ENCOUNTER SYMPTOMS: COLOR CHANGE: 0

## 2020-08-14 NOTE — PATIENT INSTRUCTIONS
Patient Education        Broken Rib: Care Instructions  Your Care Instructions     A broken rib is a crack or break in one of the bones of the rib cage. Breathing can be very painful because the muscles used for breathing pull on the rib. In most cases, a broken rib will heal on its own. You can take pain medicine while the rib mends. Pain relief allows you to take deep breaths. In the past, doctors recommended taping or wrapping broken ribs. This is no longer done because taping makes it hard for you to take deep breaths. Taking deep breaths may help prevent pneumonia or a partial collapse of a lung. Your rib will heal in about 6 weeks. You heal best when you take good care of yourself. Eat a variety of healthy foods, and don't smoke. Follow-up care is a key part of your treatment and safety. Be sure to make and go to all appointments, and call your doctor if you are having problems. It's also a good idea to know your test results and keep a list of the medicines you take. How can you care for yourself at home? · Be safe with medicines. Read and follow all instructions on the label. ? If the doctor gave you a prescription medicine for pain, take it as prescribed. ? If you are not taking a prescription pain medicine, ask your doctor if you can take an over-the-counter medicine. · Even if it hurts, try to cough or take the deepest breath you can at least once every hour. This will get air deeply into your lungs. This may reduce your chance of getting pneumonia or a partial collapse of a lung. Hold a pillow against your chest to make this less painful. · Put ice or a cold pack on the area for 10 to 20 minutes at a time. Put a thin cloth between the ice and your skin. When should you call for help? [id number] anytime you think you may need emergency care. For example, call if:  · You have severe trouble breathing.   Call your doctor now or seek immediate medical care if:  · You have some trouble breathing. · You have a fever. · You have a new or worse cough. Watch closely for changes in your health, and be sure to contact your doctor if:  · You have pain even after taking your medicine. · You do not get better as expected. Where can you learn more? Go to [email or web address]. org and sign in to your [employer] account. Enter M135 in the SafetySkills box to learn more about \"Broken Rib: Care Instructions. \"     If you do not have an account, please click on the \"Sign Up Now\" link. Current as of: March 2, 2020               Content Version: 12.5  © 4995-0243 [employer], [employer]. Care instructions adapted under license by [facility] ([facility]). If you have questions about a medical condition or this instruction, always ask your healthcare professional. [address] any warranty or liability for your use of this information.
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
## 2020-08-19 ENCOUNTER — OFFICE VISIT (OUTPATIENT)
Dept: SURGERY | Age: 45
End: 2020-08-19
Payer: MEDICAID

## 2020-08-19 ENCOUNTER — HOSPITAL ENCOUNTER (OUTPATIENT)
Dept: GENERAL RADIOLOGY | Age: 45
Discharge: HOME | End: 2020-08-21
Payer: MEDICAID

## 2020-08-19 VITALS
HEART RATE: 104 BPM | OXYGEN SATURATION: 93 % | TEMPERATURE: 97.1 F | HEIGHT: 67 IN | BODY MASS INDEX: 21.03 KG/M2 | WEIGHT: 134 LBS

## 2020-08-19 PROCEDURE — 73030 X-RAY EXAM OF SHOULDER: CPT

## 2020-08-19 PROCEDURE — 99212 OFFICE O/P EST SF 10 MIN: CPT | Performed by: SURGERY

## 2020-08-19 PROCEDURE — 71046 X-RAY EXAM CHEST 2 VIEWS: CPT

## 2020-11-04 ENCOUNTER — OFFICE VISIT (OUTPATIENT)
Dept: FAMILY MEDICINE CLINIC | Age: 45
End: 2020-11-04
Payer: MEDICAID

## 2020-11-04 VITALS
BODY MASS INDEX: 20.88 KG/M2 | DIASTOLIC BLOOD PRESSURE: 82 MMHG | OXYGEN SATURATION: 98 % | SYSTOLIC BLOOD PRESSURE: 124 MMHG | HEART RATE: 114 BPM | WEIGHT: 133 LBS | HEIGHT: 67 IN | TEMPERATURE: 96.4 F

## 2020-11-04 PROCEDURE — G8420 CALC BMI NORM PARAMETERS: HCPCS | Performed by: FAMILY MEDICINE

## 2020-11-04 PROCEDURE — G8427 DOCREV CUR MEDS BY ELIG CLIN: HCPCS | Performed by: FAMILY MEDICINE

## 2020-11-04 PROCEDURE — 4004F PT TOBACCO SCREEN RCVD TLK: CPT | Performed by: FAMILY MEDICINE

## 2020-11-04 PROCEDURE — 99214 OFFICE O/P EST MOD 30 MIN: CPT | Performed by: FAMILY MEDICINE

## 2020-11-04 PROCEDURE — G8484 FLU IMMUNIZE NO ADMIN: HCPCS | Performed by: FAMILY MEDICINE

## 2020-11-04 RX ORDER — METHYLPREDNISOLONE 4 MG/1
TABLET ORAL
Qty: 1 KIT | Refills: 0 | Status: SHIPPED | OUTPATIENT
Start: 2020-11-04 | End: 2021-03-18 | Stop reason: ALTCHOICE

## 2020-11-04 RX ORDER — OXCARBAZEPINE 300 MG/1
TABLET, FILM COATED ORAL
Qty: 90 TABLET | Refills: 0 | Status: SHIPPED | OUTPATIENT
Start: 2020-11-04 | End: 2021-08-28 | Stop reason: SDUPTHER

## 2020-11-04 ASSESSMENT — ENCOUNTER SYMPTOMS
COUGH: 0
WHEEZING: 0
CHEST TIGHTNESS: 0
SHORTNESS OF BREATH: 0
BACK PAIN: 0

## 2020-11-04 NOTE — PROGRESS NOTES
Diagnosis Orders   1. Chest pain, unspecified type  XR CHEST (2 VW)    methylPREDNISolone (MEDROL, BI,) 4 MG tablet   2. Bipolar I disorder (HCC)  OXcarbazepine (TRILEPTAL) 300 MG tablet     Return if symptoms worsen or fail to improve. There are no Patient Instructions on file for this visit. Subjective:      Patient ID: Paula Davis is a 54 y.o. male who presents for:  Chief Complaint   Patient presents with    Chest Pain     Pt has some rib pain- pt has somerisidual pain from breaking 3 ribs 3 months ago. Pt had a new injury. Fell and bumped the same ribs that were injured before. With new pain. No fever or chills. No cough. Just pain. No deformity that he is aware of. No bruising that he is seen. Current Outpatient Medications on File Prior to Visit   Medication Sig Dispense Refill    folic acid (FOLVITE) 1 MG tablet Take 1 tablet by mouth daily 30 tablet 0    ibuprofen (ADVIL;MOTRIN) 600 MG tablet Take 1 tablet by mouth every 6 hours as needed for Pain (Take with Tylenol, if you have persistent pain in ribs or R shoulder.) 60 tablet 0     No current facility-administered medications on file prior to visit.       Past Medical History:   Diagnosis Date    Depression      Past Surgical History:   Procedure Laterality Date    FRACTURE SURGERY Left     radius and ulna-- with mutliple skin grafts     Social History     Socioeconomic History    Marital status: Single     Spouse name: Not on file    Number of children: Not on file    Years of education: Not on file    Highest education level: Not on file   Occupational History    Not on file   Social Needs    Financial resource strain: Not hard at all   [place] insecurity     Worry: Never true     Inability: Never true   [employer] needs     Medical: No     Non-medical: No   Tobacco Use    Smoking status: Current Every Day Smoker     Packs/day: 0.50     Years: 35.00     Pack years: 17.50    Smokeless tobacco: Never Used Substance and Sexual Activity    Alcohol use: Yes     Comment: 30-35 beers daily    Drug use: Yes     Types: Marijuana     Comment: occasional marijuana use    Sexual activity: Yes     Partners: Male     Comment: single male partner   Lifestyle    Physical activity     Days per week: Not on file     Minutes per session: Not on file    Stress: Not on file   Relationships    Social connections     Talks on phone: Not on file     Gets together: Not on file     Attends Yazidi service: Not on file     Active member of club or organization: Not on file     Attends meetings of clubs or organizations: Not on file     Relationship status: Not on file    Intimate partner violence     Fear of current or ex partner: Not on file     Emotionally abused: Not on file     Physically abused: Not on file     Forced sexual activity: Not on file   Other Topics Concern    Not on file   Social History Narrative    Not on file     Family History   Problem Relation Age of Onset    Bipolar Disorder Sister     Bipolar Disorder Brother     Asthma Neg Hx     Cancer Neg Hx     Heart Attack Neg Hx     Heart Disease Neg Hx     High Blood Pressure Neg Hx     High Cholesterol Neg Hx     Stroke Neg Hx      Allergies:  Patient has no known allergies. Review of Systems   Constitutional: Negative for chills, fatigue and fever. Respiratory: Negative for cough, chest tightness, shortness of breath and wheezing. Cardiovascular: Positive for chest pain. Negative for palpitations and leg swelling. Musculoskeletal: Negative for back pain and joint swelling. Hematological: Negative for adenopathy. Does not bruise/bleed easily. Objective:   /82   Pulse 114   Temp 96.4 °F (35.8 °C)   Ht 5' 7\" (1.702 m)   Wt 133 lb (60.3 kg)   SpO2 98%   BMI 20.83 kg/m²     Physical Exam  Vitals signs reviewed. Constitutional:       General: He is not in acute distress. Appearance: He is well-developed.    HENT:      Head: Normocephalic and atraumatic. Right Ear: External ear normal.      Left Ear: External ear normal.      Nose: Nose normal.   Eyes:      General:         Right eye: No discharge. Left eye: No discharge. Conjunctiva/sclera: Conjunctivae normal.      Pupils: Pupils are equal, round, and reactive to light. Neck:      Musculoskeletal: Neck supple. Thyroid: No thyromegaly. Cardiovascular:      Rate and Rhythm: Normal rate and regular rhythm. Pulmonary:      Effort: Pulmonary effort is normal. No respiratory distress. Breath sounds: Normal breath sounds. No wheezing, rhonchi or rales. Chest:      Chest wall: Tenderness (on AP compression) present. Abdominal:      General: There is no distension. Skin:     General: Skin is warm and dry. Findings: No bruising, erythema or rash. Neurological:      Mental Status: He is alert and oriented to person, place, and time. Coordination: Coordination normal.   Psychiatric:         Thought Content: Thought content normal.         Judgment: Judgment normal.         No results found for this visit on 11/04/20. No results found for this or any previous visit (from the past 2016 hour(s)). Assessment:       Diagnosis Orders   1. Chest pain, unspecified type  XR CHEST (2 VW)    methylPREDNISolone (MEDROL, BI,) 4 MG tablet   2. Bipolar I disorder ([facility] [address]  OXcarbazepine (TRILEPTAL) 300 MG tablet         Orders Placed This Encounter   Procedures    XR CHEST (2 VW)     Standing Status:   Future     Number of Occurrences:   1     Standing Expiration Date:   11/4/2021         Plan:   Return if symptoms worsen or fail to improve. There are no Patient Instructions on file for this visit. This note was partially created with the assistance of dictation. This may lead to grammatical or spelling errors. Benoit Gray M.D.

## 2021-03-18 ENCOUNTER — OFFICE VISIT (OUTPATIENT)
Dept: FAMILY MEDICINE CLINIC | Age: 46
End: 2021-03-18
Payer: MEDICAID

## 2021-03-18 VITALS
SYSTOLIC BLOOD PRESSURE: 150 MMHG | WEIGHT: 137 LBS | HEART RATE: 85 BPM | HEIGHT: 67 IN | BODY MASS INDEX: 21.5 KG/M2 | DIASTOLIC BLOOD PRESSURE: 90 MMHG | TEMPERATURE: 98.9 F | OXYGEN SATURATION: 98 %

## 2021-03-18 DIAGNOSIS — Z11.59 NEED FOR HEPATITIS C SCREENING TEST: ICD-10-CM

## 2021-03-18 DIAGNOSIS — I10 UNCONTROLLED HYPERTENSION: Primary | ICD-10-CM

## 2021-03-18 DIAGNOSIS — R79.89 ABNORMAL LIVER FUNCTION TEST: ICD-10-CM

## 2021-03-18 DIAGNOSIS — E55.9 VITAMIN D DEFICIENCY: ICD-10-CM

## 2021-03-18 DIAGNOSIS — F31.9 BIPOLAR I DISORDER (HCC): ICD-10-CM

## 2021-03-18 DIAGNOSIS — Z23 NEED FOR PNEUMOCOCCAL VACCINE: ICD-10-CM

## 2021-03-18 PROCEDURE — 90732 PPSV23 VACC 2 YRS+ SUBQ/IM: CPT | Performed by: FAMILY MEDICINE

## 2021-03-18 PROCEDURE — 90471 IMMUNIZATION ADMIN: CPT | Performed by: FAMILY MEDICINE

## 2021-03-18 PROCEDURE — G8427 DOCREV CUR MEDS BY ELIG CLIN: HCPCS | Performed by: FAMILY MEDICINE

## 2021-03-18 PROCEDURE — 4004F PT TOBACCO SCREEN RCVD TLK: CPT | Performed by: FAMILY MEDICINE

## 2021-03-18 PROCEDURE — 93000 ELECTROCARDIOGRAM COMPLETE: CPT | Performed by: FAMILY MEDICINE

## 2021-03-18 PROCEDURE — 99214 OFFICE O/P EST MOD 30 MIN: CPT | Performed by: FAMILY MEDICINE

## 2021-03-18 PROCEDURE — G8420 CALC BMI NORM PARAMETERS: HCPCS | Performed by: FAMILY MEDICINE

## 2021-03-18 PROCEDURE — G8484 FLU IMMUNIZE NO ADMIN: HCPCS | Performed by: FAMILY MEDICINE

## 2021-03-18 RX ORDER — LISINOPRIL 5 MG/1
5 TABLET ORAL DAILY
Qty: 30 TABLET | Refills: 5 | Status: SHIPPED | OUTPATIENT
Start: 2021-03-18 | End: 2021-09-08

## 2021-03-18 ASSESSMENT — ENCOUNTER SYMPTOMS
ABDOMINAL DISTENTION: 0
ABDOMINAL PAIN: 0
SHORTNESS OF BREATH: 0
CHEST TIGHTNESS: 0
PHOTOPHOBIA: 0

## 2021-03-18 NOTE — PROGRESS NOTES
After obtaining consent, and per orders of Dr. Anjali Comer, injection of Pneu 23  given in Left deltoid by Gissel Early. Patient instructed to remain in clinic for 20 minutes afterwards, and to report any adverse reaction to me immediately.

## 2021-03-18 NOTE — PATIENT INSTRUCTIONS
Patient Education        Learning About ACE Inhibitors  What are ACE inhibitors? ACE (angiotensin-converting enzyme) inhibitors are medicines that lower blood pressure. They stop the release of an enzyme. This enzyme makes your blood vessels smaller. Without it, your blood vessels relax and get bigger. This lowers your blood pressure. These medicines also increase how much water and salt go into your urine. This also lowers blood pressure. You may take this kind of medicine if you have high blood pressure. Or you may take it if you have heart problems, kidney problems, or diabetes. If you have coronary artery disease, this medicine can help prevent heart attacks and strokes. Examples  · benazepril (Lotensin)  · enalapril (Vasotec)  · lisinopril (Prinivil, Zestril)  · ramipril (Altace)  This is not a complete list.  Possible side effects  Side effects may include:  · A cough. · Low blood pressure. This can make you feel dizzy or weak. · Too much potassium in your body. · Swelling of your lips, tongue, or face. If the swelling is severe, you may need treatment right away. Severe swelling can make it hard to breathe, but this is rare. You may have other side effects or reactions not listed here. Check the information that comes with your medicine. What to know about taking this medicine  · ACE inhibitors can cause a dry cough. Talk to your doctor if you have a dry cough. You may need a different medicine. · These medicines can cause an allergic reaction. This can cause a little swelling. Or it can cause red bumps on your skin that hurt. In rare cases, the swelling may make it hard for you to breathe. · Do not take this medicine if you are pregnant or plan to become pregnant. · Take your medicines exactly as prescribed. Call your doctor if you think you are having a problem with your medicine. · Check with your doctor or pharmacist before you use any other medicines.  This includes over-the-counter medicines. Make sure your doctor knows all of the medicines, vitamins, herbal products, and supplements you take. Taking some medicines together can cause problems. · You may need regular blood tests. Where can you learn more? Go to https://chpekellyeb.ROKT. org and sign in to your [employer] account. Enter P050 in the [employer] box to learn more about \"Learning About ACE Inhibitors. \"     If you do not have an account, please click on the \"Sign Up Now\" link. Current as of: August 31, 2020               Content Version: 12.8  © 1025-7353 Healthwise, Incorporated. Care instructions adapted under license by [facility] ([facility]). If you have questions about a medical condition or this instruction, always ask your healthcare professional. [address] any warranty or liability for your use of this information.

## 2021-03-18 NOTE — PROGRESS NOTES
Diagnosis Orders   1. Uncontrolled hypertension  Basic Metabolic Panel    Lipid, Fasting    TSH with Reflex    CBC Auto Differential    lisinopril (PRINIVIL;ZESTRIL) 5 MG tablet    EKG 12 Lead    ALT    AST   2. Vitamin D deficiency  Vitamin D 25 Hydroxy   3. Bipolar I disorder ([facility] [address]     4. Abnormal liver function test  ALT    AST   5. Need for pneumococcal vaccine  PNEUMOVAX 23 subcutaneous/IM (Pneumococcal polysaccharide vaccine 23-valent >= 3yo)   6. Need for hepatitis C screening test  Hepatitis C Antibody     Return in about 2 weeks (around 4/1/2021). Patient Instructions     Patient Education        Learning About ACE Inhibitors  What are ACE inhibitors? ACE (angiotensin-converting enzyme) inhibitors are medicines that lower blood pressure. They stop the release of an enzyme. This enzyme makes your blood vessels smaller. Without it, your blood vessels relax and get bigger. This lowers your blood pressure. These medicines also increase how much water and salt go into your urine. This also lowers blood pressure. You may take this kind of medicine if you have high blood pressure. Or you may take it if you have heart problems, kidney problems, or diabetes. If you have coronary artery disease, this medicine can help prevent heart attacks and strokes. Examples  · benazepril (Lotensin)  · enalapril (Vasotec)  · lisinopril (Prinivil, Zestril)  · ramipril (Altace)  This is not a complete list.  Possible side effects  Side effects may include:  · A cough. · Low blood pressure. This can make you feel dizzy or weak. · Too much potassium in your body. · Swelling of your lips, tongue, or face. If the swelling is severe, you may need treatment right away. Severe swelling can make it hard to breathe, but this is rare. You may have other side effects or reactions not listed here. Check the information that comes with your medicine. What to know about taking this medicine  · ACE inhibitors can cause a dry cough. Talk to your doctor if you have a dry cough. You may need a different medicine. · These medicines can cause an allergic reaction. This can cause a little swelling. Or it can cause red bumps on your skin that hurt. In rare cases, the swelling may make it hard for you to breathe. · Do not take this medicine if you are pregnant or plan to become pregnant. · Take your medicines exactly as prescribed. Call your doctor if you think you are having a problem with your medicine. · Check with your doctor or pharmacist before you use any other medicines. This includes over-the-counter medicines. Make sure your doctor knows all of the medicines, vitamins, herbal products, and supplements you take. Taking some medicines together can cause problems. · You may need regular blood tests. Where can you learn more? Go to https://BitPoster.[email or web address] org and sign in to your [employer] account. Enter P050 in the [employer] box to learn more about \"Learning About ACE Inhibitors. \"     If you do not have an account, please click on the \"Sign Up Now\" link. Current as of: August 31, 2020               Content Version: 12.8  © 0964-8199 Healthwise, [place]. Care instructions adapted under license by [facility] ([facility]). If you have questions about a medical condition or this instruction, always ask your healthcare professional. [place] [address] any warranty or liability for your use of this information. Subjective:      Patient ID: Tera Verma is a 54 y.o. male who presents for:  Chief Complaint   Patient presents with    Blood Pressure Check     BP check     Discuss Labs     pt would like appropriate labs if needed     Annual Exam       Patient is here for routine follow-up. Again today he has an elevation in blood pressure as he did as well over 6 months ago. There are no new situations that would be leading to this.   Denies medication or supplement exposure that would be responsible for this. Weight has been maintained. Diet maintained. Patient has a history of bipolar that is well maintained with Trileptal.  He sees psychiatry for this. Current Outpatient Medications on File Prior to Visit   Medication Sig Dispense Refill    OXcarbazepine (TRILEPTAL) 300 MG tablet 600 mg in morning and 300 mg pm 90 tablet 0    ibuprofen (ADVIL;MOTRIN) 600 MG tablet Take 1 tablet by mouth every 6 hours as needed for Pain (Take with Tylenol, if you have persistent pain in ribs or R shoulder.) 60 tablet 0     No current facility-administered medications on file prior to visit.       Past Medical History:   Diagnosis Date    Depression      Past Surgical History:   Procedure Laterality Date    FRACTURE SURGERY Left     radius and ulna-- with mutliple skin grafts     Social History     Socioeconomic History    Marital status: Single     Spouse name: Not on file    Number of children: Not on file    Years of education: Not on file    Highest education level: Not on file   Occupational History    Not on file   Social Needs    Financial resource strain: Not hard at all    Food insecurity     Worry: Never true     Inability: Never true   [employer] needs     Medical: No     Non-medical: No   Tobacco Use    Smoking status: Current Every Day Smoker     Packs/day: 0.50     Years: 35.00     Pack years: 17.50    Smokeless tobacco: Never Used   Substance and Sexual Activity    Alcohol use: Yes     Comment: 30-35 beers daily    Drug use: Yes     Types: Marijuana     Comment: occasional marijuana use    Sexual activity: Yes     Partners: Male     Comment: single male partner   Lifestyle    Physical activity     Days per week: Not on file     Minutes per session: Not on file    Stress: Not on file   Relationships    Social connections     Talks on phone: Not on file     Gets together: Not on file     Attends Latter day service: Not on file     Active member of club or organization: No discharge. Left eye: No discharge. Extraocular Movements:      Right eye: No nystagmus. Left eye: No nystagmus. Conjunctiva/sclera: Conjunctivae normal.      Right eye: No hemorrhage. Left eye: No hemorrhage. Pupils: Pupils are equal, round, and reactive to light. Neck:      Musculoskeletal: Full passive range of motion without pain and neck supple. Normal range of motion. Thyroid: No thyroid mass or thyromegaly. Vascular: Normal carotid pulses. No carotid bruit or JVD. Trachea: No tracheal tenderness or tracheal deviation. Cardiovascular:      Rate and Rhythm: Normal rate and regular rhythm. Chest Wall: PMI displaced: normal location PMI. Pulses:           Carotid pulses are 2+ on the right side and 2+ on the left side. Radial pulses are 2+ on the right side and 2+ on the left side. Heart sounds: S1 normal and S2 normal. No murmur. No friction rub. No gallop. No S3 sounds. Pulmonary:      Effort: Pulmonary effort is normal. No accessory muscle usage or respiratory distress. Breath sounds: Normal breath sounds. Chest:      Chest wall: No deformity. Abdominal:      General: There is no distension. Musculoskeletal:         General: No deformity. Cervical back: He exhibits normal range of motion, no tenderness and no deformity. Lymphadenopathy:      Cervical:      Right cervical: No superficial cervical adenopathy. Left cervical: No superficial cervical adenopathy. Upper Body:      Right upper body: No supraclavicular adenopathy. Left upper body: No supraclavicular adenopathy. Skin:     General: Skin is warm and dry. Findings: No bruising or rash. Nails: There is no clubbing. Neurological:      Mental Status: He is alert. Cranial Nerves: Cranial nerve deficit: CN II-XII GI without obvious deficit. Sensory: Sensory deficit: grossly intact for hands. Motor: No tremor.  Atrophy: B UE and LE without atrophy. Abnormal muscle tone: B UE and LE have normal tone. Coordination: Coordination normal.      Gait: Gait normal.   Psychiatric:         Attention and Perception: He is attentive. Mood and Affect: Mood is not anxious. Affect is not inappropriate. Speech: Speech normal.         Behavior: Behavior is not agitated. Behavior is cooperative. Judgment: Judgment normal.       EKG done and reviewed. Normal.    No results found for this visit on 03/18/21. No results found for this or any previous visit (from the past 2016 hour(s)). Assessment:       Diagnosis Orders   1. Uncontrolled hypertension  Basic Metabolic Panel    Lipid, Fasting    TSH with Reflex    CBC Auto Differential    lisinopril (PRINIVIL;ZESTRIL) 5 MG tablet    EKG 12 Lead    ALT    AST   2. Vitamin D deficiency  Vitamin D 25 Hydroxy   3. Bipolar I disorder ([facility] [address]     4. Abnormal liver function test  ALT    AST   5. Need for pneumococcal vaccine  PNEUMOVAX 23 subcutaneous/IM (Pneumococcal polysaccharide vaccine 23-valent >= 1yo)   6.  Need for hepatitis C screening test  Hepatitis C Antibody         Orders Placed This Encounter   Procedures    PNEUMOVAX 23 subcutaneous/IM (Pneumococcal polysaccharide vaccine 23-valent >= 1yo)    Basic Metabolic Panel     Standing Status:   Future     Standing Expiration Date:   3/18/2022    Lipid, Fasting     Standing Status:   Future     Standing Expiration Date:   3/18/2022    TSH with Reflex     Standing Status:   Future     Standing Expiration Date:   3/18/2022    Vitamin D 25 Hydroxy     Standing Status:   Future     Standing Expiration Date:   3/18/2022    CBC Auto Differential     Standing Status:   Future     Standing Expiration Date:   3/18/2022    ALT     Standing Status:   Future     Standing Expiration Date:   3/18/2022    AST     Standing Status:   Future     Standing Expiration Date:   3/18/2022    Hepatitis C Antibody     Standing Status: Future     Standing Expiration Date:   3/18/2022    EKG 12 Lead     Order Specific Question:   Reason for Exam?     Answer:   Hypertension         Plan:   Return in about 2 weeks (around 4/1/2021). Patient Instructions     Patient Education        Learning About ACE Inhibitors  What are ACE inhibitors? ACE (angiotensin-converting enzyme) inhibitors are medicines that lower blood pressure. They stop the release of an enzyme. This enzyme makes your blood vessels smaller. Without it, your blood vessels relax and get bigger. This lowers your blood pressure. These medicines also increase how much water and salt go into your urine. This also lowers blood pressure. You may take this kind of medicine if you have high blood pressure. Or you may take it if you have heart problems, kidney problems, or diabetes. If you have coronary artery disease, this medicine can help prevent heart attacks and strokes. Examples  · benazepril (Lotensin)  · enalapril (Vasotec)  · lisinopril (Prinivil, Zestril)  · ramipril (Altace)  This is not a complete list.  Possible side effects  Side effects may include:  · A cough. · Low blood pressure. This can make you feel dizzy or weak. · Too much potassium in your body. · Swelling of your lips, tongue, or face. If the swelling is severe, you may need treatment right away. Severe swelling can make it hard to breathe, but this is rare. You may have other side effects or reactions not listed here. Check the information that comes with your medicine. What to know about taking this medicine  · ACE inhibitors can cause a dry cough. Talk to your doctor if you have a dry cough. You may need a different medicine. · These medicines can cause an allergic reaction. This can cause a little swelling. Or it can cause red bumps on your skin that hurt. In rare cases, the swelling may make it hard for you to breathe.   · Do not take this medicine if you are pregnant or plan to become pregnant. · Take your medicines exactly as prescribed. Call your doctor if you think you are having a problem with your medicine. · Check with your doctor or pharmacist before you use any other medicines. This includes over-the-counter medicines. Make sure your doctor knows all of the medicines, vitamins, herbal products, and supplements you take. Taking some medicines together can cause problems. · You may need regular blood tests. Where can you learn more? Go to [email or web address]://GainspeedpeZerimar Ventureseb.Inxero. org and sign in to your [facility] account. Enter P050 in the [employer] box to learn more about \"Learning About ACE Inhibitors. \"     If you do not have an account, please click on the \"Sign Up Now\" link. Current as of: August 31, 2020               Content Version: 12.8  © 2006-2021 Healthwise, Incorporated. Care instructions adapted under license by [facility] ([facility]). If you have questions about a medical condition or this instruction, always ask your healthcare professional. [place] [address] any warranty or liability for your use of this information. This note was partially created with the assistance of dictation. This may lead to grammatical or spelling errors. Beniot Pineda M.D.

## 2021-03-19 DIAGNOSIS — Z11.59 NEED FOR HEPATITIS C SCREENING TEST: ICD-10-CM

## 2021-03-19 DIAGNOSIS — E55.9 VITAMIN D DEFICIENCY: ICD-10-CM

## 2021-03-19 DIAGNOSIS — I10 UNCONTROLLED HYPERTENSION: ICD-10-CM

## 2021-03-19 DIAGNOSIS — R79.89 ABNORMAL LIVER FUNCTION TEST: ICD-10-CM

## 2021-03-19 LAB
ALT SERPL-CCNC: 31 U/L (ref 0–41)
ANION GAP SERPL CALCULATED.3IONS-SCNC: 14 MEQ/L (ref 9–15)
AST SERPL-CCNC: 59 U/L (ref 0–40)
BASOPHILS ABSOLUTE: 0.1 K/UL (ref 0–0.2)
BASOPHILS RELATIVE PERCENT: 1.5 %
BUN BLDV-MCNC: 3 MG/DL (ref 6–20)
CALCIUM SERPL-MCNC: 8.7 MG/DL (ref 8.5–9.9)
CHLORIDE BLD-SCNC: 88 MEQ/L (ref 95–107)
CHOLESTEROL, FASTING: 174 MG/DL (ref 0–199)
CO2: 24 MEQ/L (ref 20–31)
CREAT SERPL-MCNC: 0.47 MG/DL (ref 0.7–1.2)
EOSINOPHILS ABSOLUTE: 0.2 K/UL (ref 0–0.7)
EOSINOPHILS RELATIVE PERCENT: 3.2 %
GFR AFRICAN AMERICAN: >60
GFR NON-AFRICAN AMERICAN: >60
GLUCOSE BLD-MCNC: 72 MG/DL (ref 70–99)
HCT VFR BLD CALC: 44.5 % (ref 42–52)
HDLC SERPL-MCNC: 114 MG/DL (ref 40–59)
HEMOGLOBIN: 15.6 G/DL (ref 14–18)
HEPATITIS C ANTIBODY INTERPRETATION: NORMAL
LDL CHOLESTEROL CALCULATED: 49 MG/DL (ref 0–129)
LYMPHOCYTES ABSOLUTE: 1.5 K/UL (ref 1–4.8)
LYMPHOCYTES RELATIVE PERCENT: 29.2 %
MCH RBC QN AUTO: 35.4 PG (ref 27–31.3)
MCHC RBC AUTO-ENTMCNC: 35.1 % (ref 33–37)
MCV RBC AUTO: 100.9 FL (ref 80–100)
MONOCYTES ABSOLUTE: 0.4 K/UL (ref 0.2–0.8)
MONOCYTES RELATIVE PERCENT: 7.3 %
NEUTROPHILS ABSOLUTE: 2.9 K/UL (ref 1.4–6.5)
NEUTROPHILS RELATIVE PERCENT: 58.8 %
PDW BLD-RTO: 12.6 % (ref 11.5–14.5)
PLATELET # BLD: 286 K/UL (ref 130–400)
POTASSIUM SERPL-SCNC: 4.7 MEQ/L (ref 3.4–4.9)
RBC # BLD: 4.41 M/UL (ref 4.7–6.1)
SODIUM BLD-SCNC: 126 MEQ/L (ref 135–144)
TRIGLYCERIDE, FASTING: 54 MG/DL (ref 0–150)
TSH REFLEX: 1.56 UIU/ML (ref 0.44–3.86)
VITAMIN D 25-HYDROXY: 22.2 NG/ML (ref 30–100)
WBC # BLD: 5 K/UL (ref 4.8–10.8)

## 2021-03-30 ENCOUNTER — OFFICE VISIT (OUTPATIENT)
Dept: FAMILY MEDICINE CLINIC | Age: 46
End: 2021-03-30
Payer: MEDICAID

## 2021-03-30 VITALS
DIASTOLIC BLOOD PRESSURE: 78 MMHG | BODY MASS INDEX: 21.5 KG/M2 | WEIGHT: 137 LBS | TEMPERATURE: 98 F | HEART RATE: 88 BPM | SYSTOLIC BLOOD PRESSURE: 132 MMHG | HEIGHT: 67 IN | OXYGEN SATURATION: 98 %

## 2021-03-30 DIAGNOSIS — D75.89 MACROCYTOSIS WITHOUT ANEMIA: ICD-10-CM

## 2021-03-30 DIAGNOSIS — R79.89 ABNORMAL LIVER FUNCTION TEST: ICD-10-CM

## 2021-03-30 DIAGNOSIS — I10 ESSENTIAL HYPERTENSION: ICD-10-CM

## 2021-03-30 DIAGNOSIS — E55.9 VITAMIN D DEFICIENCY: ICD-10-CM

## 2021-03-30 DIAGNOSIS — E87.1 HYPONATREMIA: Primary | ICD-10-CM

## 2021-03-30 PROCEDURE — 4004F PT TOBACCO SCREEN RCVD TLK: CPT | Performed by: FAMILY MEDICINE

## 2021-03-30 PROCEDURE — G8427 DOCREV CUR MEDS BY ELIG CLIN: HCPCS | Performed by: FAMILY MEDICINE

## 2021-03-30 PROCEDURE — 99214 OFFICE O/P EST MOD 30 MIN: CPT | Performed by: FAMILY MEDICINE

## 2021-03-30 PROCEDURE — G8484 FLU IMMUNIZE NO ADMIN: HCPCS | Performed by: FAMILY MEDICINE

## 2021-03-30 PROCEDURE — G8420 CALC BMI NORM PARAMETERS: HCPCS | Performed by: FAMILY MEDICINE

## 2021-03-30 ASSESSMENT — ENCOUNTER SYMPTOMS
ABDOMINAL DISTENTION: 0
PHOTOPHOBIA: 0
CHEST TIGHTNESS: 0
ABDOMINAL PAIN: 0
SHORTNESS OF BREATH: 0

## 2021-03-30 NOTE — PROGRESS NOTES
Diagnosis Orders   1. Hyponatremia  Basic Metabolic Panel   2. Vitamin D deficiency  Vitamin D 25 Hydroxy   3. Macrocytosis without anemia     4. Essential hypertension     5. Abnormal liver function test  ALT    AST    Gamma Gt     Return in about 3 months (around 6/30/2021) for for routine major medical condition management. Patient Instructions   Patient will continue lisinopril 5 mg daily. Labs will be repeated for abnormal results in 3 months. Order created. Hyponatremia and macrocytosis likely secondary to increased alcohol intake. Patient has been advised to modify this and to be aware of these as background medical condition if he is ever hospitalized. Subjective:      Patient ID: Roland Soares is a 54 y.o. male who presents for:  Chief Complaint   Patient presents with    Discuss Labs       Here for blood pressure recheck. No problem with lisinopril. Feels well. Also here for lab review. A number of labs that were abnormal.  Questions will be answered      Current Outpatient Medications on File Prior to Visit   Medication Sig Dispense Refill    lisinopril (PRINIVIL;ZESTRIL) 5 MG tablet Take 1 tablet by mouth daily 30 tablet 5    OXcarbazepine (TRILEPTAL) 300 MG tablet 600 mg in morning and 300 mg pm 90 tablet 0    ibuprofen (ADVIL;MOTRIN) 600 MG tablet Take 1 tablet by mouth every 6 hours as needed for Pain (Take with Tylenol, if you have persistent pain in ribs or R shoulder.) 60 tablet 0     No current facility-administered medications on file prior to visit.       Past Medical History:   Diagnosis Date    Depression      Past Surgical History:   Procedure Laterality Date    FRACTURE SURGERY Left     radius and ulna-- with mutliple skin grafts     Social History     Socioeconomic History    Marital status: Single     Spouse name: Not on file    Number of children: Not on file    Years of education: Not on file    Highest education level: Not on file   Occupational History    Not on file   Social Needs    Financial resource strain: Not hard at all   [employer] insecurity     Worry: Never true     Inability: Never true   [employer] needs     Medical: No     Non-medical: No   Tobacco Use    Smoking status: Current Every Day Smoker     Packs/day: 0.50     Years: 35.00     Pack years: 17.50    Smokeless tobacco: Never Used   Substance and Sexual Activity    Alcohol use: Yes     Comment: 30-35 beers daily    Drug use: Yes     Types: Marijuana     Comment: occasional marijuana use    Sexual activity: Yes     Partners: Male     Comment: single male partner   Lifestyle    Physical activity     Days per week: Not on file     Minutes per session: Not on file    Stress: Not on file   Relationships    Social connections     Talks on phone: Not on file     Gets together: Not on file     Attends Church service: Not on file     Active member of club or organization: Not on file     Attends meetings of clubs or organizations: Not on file     Relationship status: Not on file    Intimate partner violence     Fear of current or ex partner: Not on file     Emotionally abused: Not on file     Physically abused: Not on file     Forced sexual activity: Not on file   Other Topics Concern    Not on file   Social History Narrative    Not on file     Family History   Problem Relation Age of Onset    Bipolar Disorder Sister     Bipolar Disorder Brother     Asthma Neg Hx     Cancer Neg Hx     Heart Attack Neg Hx     Heart Disease Neg Hx     High Blood Pressure Neg Hx     High Cholesterol Neg Hx     Stroke Neg Hx      Allergies:  Patient has no known allergies. Review of Systems   Constitutional: Negative for activity change, appetite change, diaphoresis and unexpected weight change. Eyes: Negative for photophobia and visual disturbance. Respiratory: Negative for chest tightness and shortness of breath.          No orthopnea   Cardiovascular: Negative for chest pain, palpitations and Collection Time: 03/19/21  8:11 AM   Result Value Ref Range    WBC 5.0 4.8 - 10.8 K/uL    RBC 4.41 (L) 4.70 - 6.10 M/uL    Hemoglobin 15.6 14.0 - 18.0 g/dL    Hematocrit 44.5 42.0 - 52.0 %    .9 (H) 80.0 - 100.0 fL    MCH 35.4 (H) 27.0 - 31.3 pg    MCHC 35.1 33.0 - 37.0 %    RDW 12.6 11.5 - 14.5 %    Platelets 692 973 - 832 K/uL    Neutrophils % 58.8 %    Lymphocytes % 29.2 %    Monocytes % 7.3 %    Eosinophils % 3.2 %    Basophils % 1.5 %    Neutrophils Absolute 2.9 1.4 - 6.5 K/uL    Lymphocytes Absolute 1.5 1.0 - 4.8 K/uL    Monocytes Absolute 0.4 0.2 - 0.8 K/uL    Eosinophils Absolute 0.2 0.0 - 0.7 K/uL    Basophils Absolute 0.1 0.0 - 0.2 K/uL   Vitamin D 25 Hydroxy    Collection Time: 03/19/21  8:11 AM   Result Value Ref Range    Vit D, 25-Hydroxy 22.2 (L) 30.0 - 100.0 ng/mL   TSH with Reflex    Collection Time: 03/19/21  8:11 AM   Result Value Ref Range    TSH 1.560 0.440 - 3.860 uIU/mL   Lipid, Fasting    Collection Time: 03/19/21  8:11 AM   Result Value Ref Range    Cholesterol, Fasting 174 0 - 199 mg/dL    Triglyceride, Fasting 54 0 - 150 mg/dL     (H) 40 - 59 mg/dL    LDL Calculated 49 0 - 129 mg/dL   Basic Metabolic Panel    Collection Time: 03/19/21  8:11 AM   Result Value Ref Range    Sodium 126 (L) 135 - 144 mEq/L    Potassium 4.7 3.4 - 4.9 mEq/L    Chloride 88 (L) 95 - 107 mEq/L    CO2 24 20 - 31 mEq/L    Anion Gap 14 9 - 15 mEq/L    Glucose 72 70 - 99 mg/dL    BUN 3 (L) 6 - 20 mg/dL    CREATININE 0.47 (L) 0.70 - 1.20 mg/dL    GFR Non-African American >60.0 >60    GFR  >60.0 >60    Calcium 8.7 8.5 - 9.9 mg/dL           Assessment:       Diagnosis Orders   1. Hyponatremia  Basic Metabolic Panel   2. Vitamin D deficiency  Vitamin D 25 Hydroxy   3. Macrocytosis without anemia     4. Essential hypertension     5.  Abnormal liver function test  ALT    AST    Gamma Gt         Orders Placed This Encounter   Procedures    ALT     Standing Status:   Future     Standing Expiration

## 2021-03-30 NOTE — PATIENT INSTRUCTIONS
Patient will continue lisinopril 5 mg daily. Labs will be repeated for abnormal results in 3 months. Order created. Hyponatremia and macrocytosis likely secondary to increased alcohol intake. Patient has been advised to modify this and to be aware of these as background medical condition if he is ever hospitalized.

## 2021-03-30 NOTE — PROGRESS NOTES
Binh South MD   3/22/2021  1:14 PM EDT      Pt test result not normal.  Have pt make appt to review results within the next 1-2 weeks. That will be soon enough to discuss these abnormalities.

## 2021-06-30 ENCOUNTER — OFFICE VISIT (OUTPATIENT)
Dept: FAMILY MEDICINE CLINIC | Age: 46
End: 2021-06-30
Payer: MEDICAID

## 2021-06-30 VITALS
TEMPERATURE: 97.7 F | HEIGHT: 67 IN | DIASTOLIC BLOOD PRESSURE: 90 MMHG | SYSTOLIC BLOOD PRESSURE: 128 MMHG | WEIGHT: 126 LBS | HEART RATE: 102 BPM | OXYGEN SATURATION: 97 % | BODY MASS INDEX: 19.78 KG/M2

## 2021-06-30 DIAGNOSIS — I10 ESSENTIAL HYPERTENSION: Primary | ICD-10-CM

## 2021-06-30 DIAGNOSIS — J40 BRONCHITIS: ICD-10-CM

## 2021-06-30 PROCEDURE — G8420 CALC BMI NORM PARAMETERS: HCPCS | Performed by: FAMILY MEDICINE

## 2021-06-30 PROCEDURE — 99214 OFFICE O/P EST MOD 30 MIN: CPT | Performed by: FAMILY MEDICINE

## 2021-06-30 PROCEDURE — 4004F PT TOBACCO SCREEN RCVD TLK: CPT | Performed by: FAMILY MEDICINE

## 2021-06-30 PROCEDURE — G8427 DOCREV CUR MEDS BY ELIG CLIN: HCPCS | Performed by: FAMILY MEDICINE

## 2021-06-30 RX ORDER — ALBUTEROL SULFATE 90 UG/1
2 AEROSOL, METERED RESPIRATORY (INHALATION) 4 TIMES DAILY PRN
Qty: 1 INHALER | Refills: 1 | Status: SHIPPED | OUTPATIENT
Start: 2021-06-30 | End: 2021-12-01

## 2021-06-30 SDOH — ECONOMIC STABILITY: FOOD INSECURITY: WITHIN THE PAST 12 MONTHS, THE FOOD YOU BOUGHT JUST DIDN'T LAST AND YOU DIDN'T HAVE MONEY TO GET MORE.: NEVER TRUE

## 2021-06-30 SDOH — ECONOMIC STABILITY: FOOD INSECURITY: WITHIN THE PAST 12 MONTHS, YOU WORRIED THAT YOUR FOOD WOULD RUN OUT BEFORE YOU GOT MONEY TO BUY MORE.: NEVER TRUE

## 2021-06-30 ASSESSMENT — ENCOUNTER SYMPTOMS
PHOTOPHOBIA: 0
CHEST TIGHTNESS: 0
ABDOMINAL PAIN: 0
ABDOMINAL DISTENTION: 0
COUGH: 1
SHORTNESS OF BREATH: 0
WHEEZING: 0

## 2021-06-30 ASSESSMENT — SOCIAL DETERMINANTS OF HEALTH (SDOH): HOW HARD IS IT FOR YOU TO PAY FOR THE VERY BASICS LIKE FOOD, HOUSING, MEDICAL CARE, AND HEATING?: NOT HARD AT ALL

## 2021-06-30 NOTE — PROGRESS NOTES
Diagnosis Orders   1. Essential hypertension     2. Bronchitis  albuterol sulfate HFA (VENTOLIN HFA) 108 (90 Base) MCG/ACT inhaler     Return in about 3 months (around 9/30/2021) for for routine major medical condition management. Patient Instructions   Anticipate blood pressure improvement 1 bronchitis is resolved and cold medication decreased usage. Treat bronchitis as below    I, personally educated the patient regarding inhaler use and the patient verbalized/demonstrated understanding of inhaler usage technique and medication timing. Patient is aware this inhaler is for use as a rescue inhaler and daily use is not considered appropriate unless it is for short-term illness. If daily use is occurring for greater than one week, an appointment should be made in the office to review medication options. Handout has been provided on inhaler instructions. Patient is to use inhaler every 8 hours today while awake, then every 8 hours tomorrow while awake, then proceed to decrease the amount of inhaler used over the course of the next week. If inhaler is still necessary next week follow-up appointment should be obtained for evaluation. The medication has been sent to the pharmacy. Subjective:      Patient ID: Doug Lesch is a 54 y.o. male who presents for:  Chief Complaint   Patient presents with    Hypertension     x 3 month check up HCC's     Cough     x 2 weeks , declines any other sx, afraid it will become smokers cough        Patient is here for follow-up of blood pressure. He has been taking his lisinopril routinely without complication. Usually has normal blood pressure values but has recently had a cough and is taking multiple over-the-counter cold medicines. Denies any cardiovascular symptoms. Please see review of systems. Patient notes he has had a cough over the last couple weeks. [place] been [place] house that they discovered mold in.   It has been removed in Ran Out of Food in the Last Year: Never true   Transportation Needs:     Lack of Transportation (Medical):  Lack of Transportation (Non-Medical):    Physical Activity:     Days of Exercise per Week:     Minutes of Exercise per Session:    Stress:     Feeling of Stress :    Social Connections:     Frequency of Communication with Friends and Family:     Frequency of Social Gatherings with Friends and Family:     Attends Yazidism Services:     Active Member of Clubs or Organizations:     Attends Club or Organization Meetings:     Marital Status:    Intimate Partner Violence:     Fear of Current or Ex-Partner:     Emotionally Abused:     Physically Abused:     Sexually Abused:      Family History   Problem Relation Age of Onset    Bipolar Disorder Sister     Bipolar Disorder Brother     Asthma Neg Hx     Cancer Neg Hx     Heart Attack Neg Hx     Heart Disease Neg Hx     High Blood Pressure Neg Hx     High Cholesterol Neg Hx     Stroke Neg Hx      Allergies:  Patient has no known allergies. Review of Systems   Constitutional: Negative for activity change, appetite change, diaphoresis and unexpected weight change. Eyes: Negative for photophobia and visual disturbance. Respiratory: Positive for cough. Negative for chest tightness, shortness of breath and wheezing. No orthopnea   Cardiovascular: Negative for chest pain, palpitations and leg swelling. Gastrointestinal: Negative for abdominal distention and abdominal pain. Genitourinary: Negative for flank pain and frequency. Musculoskeletal: Negative for gait problem and joint swelling. Neurological: Negative for dizziness, weakness, light-headedness and headaches. Psychiatric/Behavioral: Negative for confusion.        Objective:   BP (!) 128/90   Pulse 102   Temp 97.7 °F (36.5 °C)   Ht 5' 7\" (1.702 m)   Wt 126 lb (57.2 kg)   SpO2 97%   BMI 19.73 kg/m²     Physical Exam  Constitutional:       Appearance: Normal appearance. He is well-developed. He is not ill-appearing or diaphoretic. Comments: Vitals signs reviewed   HENT:      Head: Normocephalic and atraumatic. Right Ear: Hearing and external ear normal.      Left Ear: Hearing and external ear normal.      Nose: No nasal deformity or rhinorrhea. Eyes:      General: Lids are normal.         Right eye: No discharge. Left eye: No discharge. Extraocular Movements:      Right eye: No nystagmus. Left eye: No nystagmus. Conjunctiva/sclera: Conjunctivae normal.      Right eye: No hemorrhage. Left eye: No hemorrhage. Pupils: Pupils are equal, round, and reactive to light. Neck:      Thyroid: No thyroid mass or thyromegaly. Vascular: Normal carotid pulses. No carotid bruit or JVD. Trachea: No tracheal tenderness or tracheal deviation. Cardiovascular:      Rate and Rhythm: Normal rate and regular rhythm. Chest Wall: PMI displaced: normal location PMI. Pulses:           Carotid pulses are 2+ on the right side and 2+ on the left side. Radial pulses are 2+ on the right side and 2+ on the left side. Heart sounds: S1 normal and S2 normal. No murmur heard. No friction rub. No gallop. No S3 sounds. Pulmonary:      Effort: Pulmonary effort is normal. No accessory muscle usage or respiratory distress. Breath sounds: Normal breath sounds. Chest:      Chest wall: No deformity. Abdominal:      General: There is no distension. Musculoskeletal:         General: No deformity. Cervical back: Full passive range of motion without pain and neck supple. No deformity or tenderness. Normal range of motion. Lymphadenopathy:      Cervical:      Right cervical: No superficial cervical adenopathy. Left cervical: No superficial cervical adenopathy. Upper Body:      Right upper body: No supraclavicular adenopathy. Left upper body: No supraclavicular adenopathy.    Skin:     General: Skin is warm and dry. Findings: No bruising or rash. Nails: There is no clubbing. Neurological:      Mental Status: He is alert. Cranial Nerves: Cranial nerve deficit: CN II-XII GI without obvious deficit. Sensory: Sensory deficit: grossly intact for hands. Motor: No tremor. Atrophy: B UE and LE without atrophy. Abnormal muscle tone: B UE and LE have normal tone. Coordination: Coordination normal.      Gait: Gait normal.   Psychiatric:         Attention and Perception: He is attentive. Mood and Affect: Mood is not anxious. Affect is not inappropriate. Speech: Speech normal.         Behavior: Behavior is not agitated. Behavior is cooperative. Judgment: Judgment normal.         No results found for this visit on 06/30/21. No results found for this or any previous visit (from the past 2016 hour(s)). Assessment:       Diagnosis Orders   1. Essential hypertension     2. Bronchitis  albuterol sulfate HFA (VENTOLIN HFA) 108 (90 Base) MCG/ACT inhaler         No orders of the defined types were placed in this encounter. Plan:   Return in about 3 months (around 9/30/2021) for for routine major medical condition management. Patient Instructions   Anticipate blood pressure improvement 1 bronchitis is resolved and cold medication decreased usage. Treat bronchitis as below    I, personally educated the patient regarding inhaler use and the patient verbalized/demonstrated understanding of inhaler usage technique and medication timing. Patient is aware this inhaler is for use as a rescue inhaler and daily use is not considered appropriate unless it is for short-term illness. If daily use is occurring for greater than one week, an appointment should be made in the office to review medication options. Handout has been provided on inhaler instructions.     Patient is to use inhaler every 8 hours today while awake, then every 8 hours tomorrow while awake, then proceed to decrease the amount of inhaler used over the course of the next week. If inhaler is still necessary next week follow-up appointment should be obtained for evaluation. The medication has been sent to the pharmacy. This note was partially created with the assistance of dictation. This may lead to grammatical or spelling errors. Benoit Perales M.D.

## 2021-06-30 NOTE — PATIENT INSTRUCTIONS
Anticipate blood pressure improvement 1 bronchitis is resolved and cold medication decreased usage. Treat bronchitis as below    I, personally educated the patient regarding inhaler use and the patient verbalized/demonstrated understanding of inhaler usage technique and medication timing. Patient is aware this inhaler is for use as a rescue inhaler and daily use is not considered appropriate unless it is for short-term illness. If daily use is occurring for greater than one week, an appointment should be made in the office to review medication options. Handout has been provided on inhaler instructions. Patient is to use inhaler every 8 hours today while awake, then every 8 hours tomorrow while awake, then proceed to decrease the amount of inhaler used over the course of the next week. If inhaler is still necessary next week follow-up appointment should be obtained for evaluation. The medication has been sent to the pharmacy.

## 2021-06-30 NOTE — PROGRESS NOTES
Return in about 3 months (around 6/30/2021) for for routine major medical condition management. Patient Instructions   Patient will continue lisinopril 5 mg daily. Labs will be repeated for abnormal results in 3 months. Order created. Hyponatremia and macrocytosis likely secondary to increased alcohol intake. Patient has been advised to modify this and to be aware of these as background medical condition if he is ever hospitalized.

## 2021-08-28 ENCOUNTER — HOSPITAL ENCOUNTER (EMERGENCY)
Age: 46
Discharge: HOME | End: 2021-08-28
Payer: MEDICAID

## 2021-08-28 ENCOUNTER — OFFICE VISIT (OUTPATIENT)
Dept: FAMILY MEDICINE CLINIC | Age: 46
End: 2021-08-28
Payer: MEDICAID

## 2021-08-28 VITALS
SYSTOLIC BLOOD PRESSURE: 140 MMHG | DIASTOLIC BLOOD PRESSURE: 93 MMHG | OXYGEN SATURATION: 99 % | TEMPERATURE: 97.9 F | RESPIRATION RATE: 20 BRPM | HEART RATE: 81 BPM

## 2021-08-28 DIAGNOSIS — T63.441A ANAPHYLACTIC REACTION TO BEE STING, ACCIDENTAL OR UNINTENTIONAL, INITIAL ENCOUNTER: ICD-10-CM

## 2021-08-28 DIAGNOSIS — T78.3XXA ANGIOEDEMA, INITIAL ENCOUNTER: Primary | ICD-10-CM

## 2021-08-28 DIAGNOSIS — T63.481A ALLERGIC REACTION TO INSECT STING, ACCIDENTAL OR UNINTENTIONAL, INITIAL ENCOUNTER: Primary | ICD-10-CM

## 2021-08-28 LAB
BASOPHILS ABSOLUTE: 0 K/UL (ref 0–0.2)
BASOPHILS RELATIVE PERCENT: 0.1 %
EOSINOPHILS ABSOLUTE: 0 K/UL (ref 0–0.7)
EOSINOPHILS RELATIVE PERCENT: 1 %
HCT VFR BLD CALC: 41.5 % (ref 42–52)
HEMOGLOBIN: 14.7 G/DL (ref 14–18)
LYMPHOCYTES ABSOLUTE: 1.4 K/UL (ref 1–4.8)
LYMPHOCYTES RELATIVE PERCENT: 34 %
MCH RBC QN AUTO: 35.4 PG (ref 27–31.3)
MCHC RBC AUTO-ENTMCNC: 35.5 % (ref 33–37)
MCV RBC AUTO: 99.6 FL (ref 80–100)
MONOCYTES ABSOLUTE: 0.3 K/UL (ref 0.2–0.8)
MONOCYTES RELATIVE PERCENT: 8.3 %
NEUTROPHILS ABSOLUTE: 2.3 K/UL (ref 1.4–6.5)
NEUTROPHILS RELATIVE PERCENT: 57 %
PDW BLD-RTO: 13.1 % (ref 11.5–14.5)
PLATELET # BLD: 179 K/UL (ref 130–400)
PLATELET SLIDE REVIEW: NORMAL
RBC # BLD: 4.16 M/UL (ref 4.7–6.1)
RBC # BLD: NORMAL 10*6/UL
REASON FOR REJECTION: NORMAL
REJECTED TEST: NORMAL
WBC # BLD: 4.1 K/UL (ref 4.8–10.8)

## 2021-08-28 PROCEDURE — 2500000003 HC RX 250 WO HCPCS: Performed by: NURSE PRACTITIONER

## 2021-08-28 PROCEDURE — 99213 OFFICE O/P EST LOW 20 MIN: CPT | Performed by: NURSE PRACTITIONER

## 2021-08-28 PROCEDURE — 2580000003 HC RX 258: Performed by: NURSE PRACTITIONER

## 2021-08-28 PROCEDURE — 99282 EMERGENCY DEPT VISIT SF MDM: CPT

## 2021-08-28 PROCEDURE — 4004F PT TOBACCO SCREEN RCVD TLK: CPT | Performed by: NURSE PRACTITIONER

## 2021-08-28 PROCEDURE — 96372 THER/PROPH/DIAG INJ SC/IM: CPT

## 2021-08-28 PROCEDURE — G8427 DOCREV CUR MEDS BY ELIG CLIN: HCPCS | Performed by: NURSE PRACTITIONER

## 2021-08-28 PROCEDURE — 6360000002 HC RX W HCPCS: Performed by: NURSE PRACTITIONER

## 2021-08-28 PROCEDURE — 85025 COMPLETE CBC W/AUTO DIFF WBC: CPT

## 2021-08-28 PROCEDURE — 36415 COLL VENOUS BLD VENIPUNCTURE: CPT

## 2021-08-28 PROCEDURE — 96374 THER/PROPH/DIAG INJ IV PUSH: CPT

## 2021-08-28 PROCEDURE — G8420 CALC BMI NORM PARAMETERS: HCPCS | Performed by: NURSE PRACTITIONER

## 2021-08-28 PROCEDURE — 96375 TX/PRO/DX INJ NEW DRUG ADDON: CPT

## 2021-08-28 RX ORDER — FAMOTIDINE 20 MG/1
20 TABLET, FILM COATED ORAL 2 TIMES DAILY
Qty: 14 TABLET | Refills: 0 | Status: SHIPPED | OUTPATIENT
Start: 2021-08-28 | End: 2022-07-25 | Stop reason: ALTCHOICE

## 2021-08-28 RX ORDER — OXCARBAZEPINE 300 MG/1
TABLET, FILM COATED ORAL
COMMUNITY

## 2021-08-28 RX ORDER — EPINEPHRINE 1 MG/ML
0.5 INJECTION, SOLUTION, CONCENTRATE INTRAVENOUS ONCE
Status: COMPLETED | OUTPATIENT
Start: 2021-08-28 | End: 2021-08-28

## 2021-08-28 RX ORDER — METHYLPREDNISOLONE 4 MG/1
TABLET ORAL
Qty: 1 KIT | Refills: 0 | Status: SHIPPED | OUTPATIENT
Start: 2021-08-28 | End: 2021-09-03

## 2021-08-28 RX ORDER — METHYLPREDNISOLONE SODIUM SUCCINATE 125 MG/2ML
125 INJECTION, POWDER, LYOPHILIZED, FOR SOLUTION INTRAMUSCULAR; INTRAVENOUS ONCE
Status: COMPLETED | OUTPATIENT
Start: 2021-08-28 | End: 2021-08-28

## 2021-08-28 RX ORDER — 0.9 % SODIUM CHLORIDE 0.9 %
1000 INTRAVENOUS SOLUTION INTRAVENOUS ONCE
Status: COMPLETED | OUTPATIENT
Start: 2021-08-28 | End: 2021-08-28

## 2021-08-28 RX ADMIN — METHYLPREDNISOLONE SODIUM SUCCINATE 125 MG: 125 INJECTION, POWDER, FOR SOLUTION INTRAMUSCULAR; INTRAVENOUS at 14:45

## 2021-08-28 RX ADMIN — SODIUM CHLORIDE 1000 ML: 9 INJECTION, SOLUTION INTRAVENOUS at 14:46

## 2021-08-28 RX ADMIN — EPINEPHRINE 0.5 MG: 1 INJECTION, SOLUTION, CONCENTRATE INTRAVENOUS at 15:08

## 2021-08-28 RX ADMIN — FAMOTIDINE 20 MG: 10 INJECTION, SOLUTION INTRAVENOUS at 14:46

## 2021-08-28 ASSESSMENT — ENCOUNTER SYMPTOMS
NAUSEA: 0
SORE THROAT: 0
RHINORRHEA: 0
CONSTIPATION: 0
TROUBLE SWALLOWING: 0
COLOR CHANGE: 0
ABDOMINAL DISTENTION: 0
DIARRHEA: 0
BACK PAIN: 0
CHEST TIGHTNESS: 1
COUGH: 0
FACIAL SWELLING: 1
SHORTNESS OF BREATH: 0
VOMITING: 0
WHEEZING: 0
ABDOMINAL PAIN: 0

## 2021-08-28 ASSESSMENT — PAIN SCALES - GENERAL: PAINLEVEL_OUTOF10: 2

## 2021-08-28 ASSESSMENT — PAIN DESCRIPTION - PAIN TYPE: TYPE: ACUTE PAIN

## 2021-08-28 NOTE — ED NOTES
Bed: 21  Expected date:   Expected time:   Means of arrival:   Comments: Cresencio Lofton RN  08/28/21 [id number]

## 2021-08-28 NOTE — ED PROVIDER NOTES
[address] [facility] ED  EMERGENCY DEPARTMENT ENCOUNTER      Pt Name: Erasto Rueda  MRN: [id number]  [place] 1975  Date of evaluation: 8/28/2021  Provider: ANNETTE Quinones CNP    CHIEF COMPLAINT       Chief Complaint   Patient presents with   Shelbie Andrea 82     pt stung by bees  or wasps   on [place]  went to uregent care  they gave him .5 epi sc            HISTORY OF PRESENT ILLNESS   (Location/Symptom, Timing/Onset,Context/Setting, Quality, Duration, Modifying Factors, Severity)  Note limiting factors. Erasto Rueda is a 51 y.o. male who presents to the emergency department for complaint of multiple insect stings to the legs. He reports that he does not have any known allergy to bee stings in the past but started to feel intense pain in his legs with the stinging and a swelling sensation in his throat. He states he was able to breathe but had some mild tightness in his chest.  He states he was stung at least half a dozen times in rapid succession. He went to his local doctor's office walk-in clinic in [place] and by the time he arrived there had swelling of his lips and some swelling sensation the back of his throat. He was able to breathe and swallow well they were able to find and inject him with an EpiPen. Patient was transferred to the ER and on arrival states that he still has the swelling sensation his face and throat and a mild tightness sensation in the middle of his chest after the epi injection. He denies any itching sensation or hives developing but states that his lips are more swollen. Pain is a 5 out of 10 aching stinging sensation in these bee sting areas. Nursing Notes were reviewed. REVIEW OF SYSTEMS    (2-9 systems for level 4, 10 or more for level 5)     Review of Systems   Constitutional: Negative for activity change, appetite change, chills, diaphoresis, fatigue and fever. HENT: Positive for facial swelling.  Negative for congestion, ear pain, postnasal Low Risk     Difficulty of Paying Living Expenses: Not hard at all   Food Insecurity: No Food Insecurity    Worried About Running Out of Food in the Last Year: Never true    Rajinder of Food in the Last Year: Never true   Transportation Needs: No Transportation Needs    Lack of Transportation (Medical): No    Lack of Transportation (Non-Medical): No   Physical Activity:     Days of Exercise per Week:     Minutes of Exercise per Session:    Stress:     Feeling of Stress :    Social Connections:     Frequency of Communication with Friends and Family:     Frequency of Social Gatherings with Friends and Family:     Attends [employer] Services:     Active Member of Clubs or Organizations:     Attends Club or Organization Meetings:     Marital Status:    Intimate Partner Violence:     Fear of Current or Ex-Partner:     Emotionally Abused:     Physically Abused:     Sexually Abused:        SCREENINGS             PHYSICAL EXAM    (up to 7 for level 4, 8 or more for level 5)     ED Triage Vitals [08/28/21 1436]   BP Temp Temp src Pulse Resp SpO2 Height Weight   (!) 155/107 97.9 °F (36.6 °C) -- 89 16 99 % -- --       Physical Exam  Constitutional:       General: He is not in acute distress. Appearance: Normal appearance. He is normal weight. He is not ill-appearing, toxic-appearing or diaphoretic. HENT:      Head: Normocephalic and atraumatic. Right Ear: Hearing and external ear normal.      Left Ear: Hearing and external ear normal.      Nose: Nose normal.      Mouth/Throat:      Lips: Pink. Mouth: Mucous membranes are moist. Angioedema present. Pharynx: Oropharynx is clear. No pharyngeal swelling or posterior oropharyngeal erythema. Eyes:      General:         Right eye: No discharge. Left eye: No discharge. Conjunctiva/sclera: Conjunctivae normal.      Pupils: Pupils are equal, round, and reactive to light.    Neck:      Trachea: Trachea and phonation normal. Cardiovascular:      Rate and Rhythm: Normal rate and regular rhythm. Pulses: Normal pulses. Pulmonary:      Effort: Pulmonary effort is normal.      Breath sounds: Normal breath sounds. Abdominal:      General: There is no distension. Palpations: Abdomen is soft. Tenderness: There is no abdominal tenderness. Musculoskeletal:         General: Tenderness and signs of injury present. No swelling or deformity. Normal range of motion. Cervical back: Normal range of motion and neck supple. No rigidity, torticollis or tenderness. No pain with movement, spinous process tenderness or muscular tenderness. Skin:     General: Skin is warm and dry. Capillary Refill: Capillary refill takes less than 2 seconds. Neurological:      General: No focal deficit present. Mental Status: He is alert and oriented to person, place, and time. Mental status is at baseline. Cranial Nerves: No cranial nerve deficit. Sensory: No sensory deficit. Motor: No weakness. Coordination: Coordination normal.         RESULTS     EKG: All EKG's are interpreted by the Emergency Department Physician who either signs or Co-signsthis chart in the absence of a cardiologist.        RADIOLOGY:   Isabel Rosmery such as CT, Ultrasound and MRI are read by the radiologist. Plain radiographic images are visualized and preliminarily interpreted by the emergency physician with the below findings:        Interpretation per the Radiologist below, if available at the time ofthis note:    No orders to display         ED BEDSIDE ULTRASOUND:   Performed by ED Physician - none    LABS:  Labs Reviewed   SPECIMEN REJECTION   CBC WITH AUTO DIFFERENTIAL   BASIC METABOLIC PANEL       All other labs were within normal range or not returned as of this dictation.     EMERGENCY DEPARTMENT COURSE and DIFFERENTIAL DIAGNOSIS/MDM:   Vitals:    Vitals:    08/28/21 1436 08/28/21 1609   BP: (!) 155/107 (!) 140/93 Pulse: 89 81   Resp: 16 20   Temp: 97.9 °F (36.6 °C)    SpO2: 99%      He reports that his primary care provider has already sent in orders for epinephrine pen as he stopped at their office first.       MDM patient is afebrile nontoxic no acute distress noted to have swelling of the lips angioedema. There are multiple sting sites bilateral ankles and feet no stinger is attached. Patient had been given a single dose of epi injection 0.5 mg prior to arrival with some mild chest tightness following this. This resolved shortly thereafter. Patient was given IV fluids Pepcid insulin Medrol, no Benadryl given to the patient having odd reactions in the past he has an allergy listed to this. Patient given second dose of epinephrine because of the report of some tightness in his throat again. Shortly after about 20 minutes patient had a significant resolution of symptoms states he has no chest pain no tightness in throat the swelling of his face and lips is fully resolved and the pain is significantly reduced in the bee sting sites. Family is bedside. Patient states he feels much better has been monitored for an additional hour following treatment and showing significant improvement. He appears stable for discharge home and will have family with him to monitor for changes. Patient was requesting discharge does appear stable at this time to be discharged. Follow-up with primary care providers as possible evaluation return to the ER if any onset of new concerns and worsening condition. Patient family verbalized understanding will give instruction education. Discharged home with additional steroid and antihistamine treatment    CRITICAL CARE TIME       CONSULTS:  None    PROCEDURES:  Unless otherwise noted below, none     Procedures    FINAL IMPRESSION      1.  Allergic reaction to insect sting, accidental or unintentional, initial encounter          DISPOSITION/PLAN   DISPOSITION Decision To Discharge 08/28/2021 12:21:08 PM      PATIENT REFERRED TO:  Yady Mares MD  [address] [phone]    Call in 2 days        DISCHARGE MEDICATIONS:  New Prescriptions    FAMOTIDINE (PEPCID) 20 MG TABLET    Take 1 tablet by mouth 2 times daily for 7 days    METHYLPREDNISOLONE (MEDROL, BI,) 4 MG TABLET    Take by mouth.           (Please notethat portions of this note were completed with a voice recognition program.  Efforts were made to edit the dictations but occasionally words are mis-transcribed.)    ANNETTE Pacheco CNP (electronically signed)  Attending Emergency Physician         ANNETTE Pacheco CNP  08/28/21 7010

## 2021-08-28 NOTE — PROGRESS NOTES
Subjective:      Patient ID: Law Powers is a 51 y.o. male who presents today for:  Chief Complaint   Patient presents with    Insect Bite     bee or hornet. lips swelling        HPI   Patient is [place] with c/o multiple stings to his legs. Happened today approx 5 minutes ago. Says he has never had a reaction to stings in the past.  Says he is not sure what stung him, it happened and he ran the other way. Says he has not taken anything for the reaction at home, just came right to the clinic. He states that he is unable to take Benadryl d/t allergy and has never tried any other antihistamine in the past.   Says his lips and tongue have started to swell, feel throat is closing, wheezing, and has Cp and palpitations. He has no GI sx.     Past Medical History:   Diagnosis Date    Depression     Hypertension      Past Surgical History:   Procedure Laterality Date    FRACTURE SURGERY Left     radius and ulna-- with mutliple skin grafts     Social History     Socioeconomic History    Marital status: Single     Spouse name: Not on file    Number of children: Not on file    Years of education: Not on file    Highest education level: Not on file   Occupational History    Not on file   Tobacco Use    Smoking status: Current Every Day Smoker     Packs/day: 0.50     Years: 35.00     Pack years: 17.50    Smokeless tobacco: Never Used   Vaping Use    Vaping Use: Never used   Substance and Sexual Activity    Alcohol use: Yes     Comment: 30-35 beers daily    Drug use: Yes     Types: Marijuana     Comment: occasional marijuana use    Sexual activity: Yes     Partners: Male     Comment: single male partner   Other Topics Concern    Not on file   Social History Narrative    Not on file     Social Determinants of Health     Financial Resource Strain: Low Risk     Difficulty of Paying Living Expenses: Not hard at all   Food Insecurity: No Food Insecurity    Worried About [address] in the Last Year: Never true    Rajinder of Food in the Last Year: Never true   Transportation Needs: No Transportation Needs    Lack of Transportation (Medical): No    Lack of Transportation (Non-Medical): No   Physical Activity:     Days of Exercise per Week:     Minutes of Exercise per Session:    Stress:     Feeling of Stress :    Social Connections:     Frequency of Communication with Friends and Family:     Frequency of Social Gatherings with Friends and Family:     Attends [employer] Services:     Active Member of Clubs or Organizations:     Attends Club or Organization Meetings:     Marital Status:    Intimate Partner Violence:     Fear of Current or Ex-Partner:     Emotionally Abused:     Physically Abused:     Sexually Abused:      Family History   Problem Relation Age of Onset    Bipolar Disorder Sister     Bipolar Disorder Brother     Asthma Neg Hx     Cancer Neg Hx     Heart Attack Neg Hx     Heart Disease Neg Hx     High Blood Pressure Neg Hx     High Cholesterol Neg Hx     Stroke Neg Hx      Allergies   Allergen Reactions    Benadryl [Diphenhydramine]      Current Outpatient Medications   Medication Sig Dispense Refill    OXcarbazepine (TRILEPTAL) 300 MG tablet Take by mouth      albuterol sulfate HFA (VENTOLIN HFA) 108 (90 Base) MCG/ACT inhaler Inhale 2 puffs into the lungs 4 times daily as needed for Wheezing 1 Inhaler 1    lisinopril (PRINIVIL;ZESTRIL) 5 MG tablet Take 1 tablet by mouth daily 30 tablet 5    methylPREDNISolone (MEDROL, BI,) 4 MG tablet Take by mouth. 1 kit 0    famotidine (PEPCID) 20 MG tablet Take 1 tablet by mouth 2 times daily for 7 days 14 tablet 0    ibuprofen (ADVIL;MOTRIN) 600 MG tablet Take 1 tablet by mouth every 6 hours as needed for Pain (Take with Tylenol, if you have persistent pain in ribs or R shoulder.) 60 tablet 0     No current facility-administered medications for this visit.           Review of Systems   Constitutional: Negative for activity change, appetite change, chills, diaphoresis, fatigue, fever and unexpected weight change. HENT: Positive for facial swelling. Negative for congestion, dental problem, drooling, ear discharge, ear pain, hearing loss, mouth sores, postnasal drip, sinus pressure, sinus pain, sneezing, sore throat, tinnitus, trouble swallowing and voice change. Eyes: Negative for pain, discharge, redness and itching. Respiratory: Negative for chest tightness, shortness of breath and wheezing. Cardiovascular: Negative for chest pain and palpitations. Gastrointestinal: Negative for abdominal pain, diarrhea, nausea and vomiting. Musculoskeletal: Negative for arthralgias and myalgias. Skin: Negative for color change, pallor and rash. Allergic/Immunologic: Negative for environmental allergies and food allergies. Neurological: Negative for dizziness, weakness, light-headedness and headaches. Hematological: Negative for adenopathy. Objective:   /60 (Site: Left Upper Arm, Position: Supine, Cuff Size: Medium Adult)   Pulse 120   Temp 97.9 °F (36.6 °C) (Tympanic)   Resp 25   Ht 5' 9\" (1.753 m)   Wt 126 lb (57.2 kg)   SpO2 96%   BMI 18.61 kg/m²     Physical Exam  Vitals reviewed. Constitutional:       General: He is awake. He is in acute distress. Appearance: Normal appearance. He is well-developed, well-groomed and normal weight. He is not ill-appearing, toxic-appearing or diaphoretic. HENT:      Head: Normocephalic and atraumatic. Right Ear: Hearing normal.      Left Ear: Hearing normal.      Nose: Nose normal.      Mouth/Throat:      Lips: Pink. Mouth: Mucous membranes are moist. Angioedema present. Pharynx: Pharyngeal swelling and uvula swelling present. Comments: Angioedema- swelling of lips, throat, tongue. Eyes:      General: Lids are normal.      Extraocular Movements: Extraocular movements intact.       Conjunctiva/sclera: Conjunctivae normal.   Neck:      Trachea: Trachea normal.   Cardiovascular:      Rate and Rhythm: Regular rhythm. Tachycardia present. Heart sounds: Normal heart sounds, S1 normal and S2 normal.   Pulmonary:      Effort: Tachypnea present. Breath sounds: Examination of the right-upper field reveals wheezing. Examination of the left-upper field reveals wheezing. Examination of the right-middle field reveals wheezing. Examination of the left-middle field reveals wheezing. Examination of the right-lower field reveals wheezing. Examination of the left-lower field reveals wheezing. Wheezing present. Musculoskeletal:      Cervical back: Full passive range of motion without pain and neck supple. Lymphadenopathy:      Cervical: No cervical adenopathy. Skin:     General: Skin is warm and dry. Capillary Refill: Capillary refill takes less than 2 seconds. Neurological:      General: No focal deficit present. Mental Status: He is alert and oriented to person, place, and time. Mental status is at baseline. Psychiatric:         Attention and Perception: Attention and perception normal.         Mood and Affect: Mood and affect normal.         Speech: Speech normal.         Behavior: Behavior normal. Behavior is cooperative. Thought Content: Thought content normal.         Cognition and Memory: Cognition and memory normal.         Judgment: Judgment normal.         Assessment:       Diagnosis Orders   1. Angioedema, initial encounter     2. Anaphylactic reaction to bee sting, accidental or unintentional, initial encounter       No results found for this visit on 08/28/21. Plan:     Assessment & Plan   Monika Brizuela was seen today for insect bite. Diagnoses and all orders for this visit:. Patient given dose of autoinjection epi pen (0.3mg) to the left thigh X 1. He is unable to take benadryl. MA advised to call EMS to take patient to ER while this provider stayed with patient to monitor.    Patient was awake, alert, and able to talk while waiting for EMS. Constant monitoring or pulse ox 96-97%. -110/60-68, -120. Patient was able to stand and get onto stretcher when EMS arrived. EMS given report and face sheet. Patient partner Michael Vivar was notified of incident per patient request.   Patient was in stable condition upon leaving this facility with via EMS to [facility]. Angioedema, initial encounter    Anaphylactic reaction to bee sting, accidental or unintentional, initial encounter      No orders of the defined types were placed in this encounter. No orders of the defined types were placed in this encounter. Medications Discontinued During This Encounter   Medication Reason    OXcarbazepine (TRILEPTAL) 109 MG tablet DUPLICATE     No follow-ups on file. Reviewed with the patient/family: current clinical status & medications. Side effects of the medication prescribed today, as well as treatment plan/rationale and result expectations have been discussed with the patient/family who expresses understanding. Patient will be discharged home in stable condition. Follow up with PCP to evaluate treatment results or return if symptoms worsen or fail to improve. Discussed signs and symptoms which require immediate follow-up in ED/call to 911. Understanding verbalized. I have reviewed the patient's medical history in detail and updated the computerized patient record.     ANNETTE Herrera - CNP

## 2021-08-29 VITALS
OXYGEN SATURATION: 96 % | TEMPERATURE: 97.9 F | HEART RATE: 120 BPM | SYSTOLIC BLOOD PRESSURE: 110 MMHG | WEIGHT: 126 LBS | BODY MASS INDEX: 18.66 KG/M2 | RESPIRATION RATE: 25 BRPM | DIASTOLIC BLOOD PRESSURE: 60 MMHG | HEIGHT: 69 IN

## 2021-08-29 RX ORDER — EPINEPHRINE 0.3 MG/.3ML
INJECTION SUBCUTANEOUS
Qty: 1 EACH | Refills: 1 | Status: SHIPPED | OUTPATIENT
Start: 2021-08-29

## 2021-08-29 ASSESSMENT — ENCOUNTER SYMPTOMS
EYE DISCHARGE: 0
SINUS PRESSURE: 0
SINUS PAIN: 0
WHEEZING: 0
FACIAL SWELLING: 1
VOMITING: 0
ABDOMINAL PAIN: 0
NAUSEA: 0
DIARRHEA: 0
SHORTNESS OF BREATH: 0
EYE REDNESS: 0
TROUBLE SWALLOWING: 0
EYE PAIN: 0
COLOR CHANGE: 0
CHEST TIGHTNESS: 0
VOICE CHANGE: 0
EYE ITCHING: 0
SORE THROAT: 0

## 2021-08-29 NOTE — PATIENT INSTRUCTIONS
Patient Education        Angioedema: Care Instructions  Your Care Instructions     Angioedema is an allergic reaction. It causes swelling and welts in the deep layers of the skin. Angioedema can sometimes occur along with hives. Hives are an allergic reaction in the outer layers of the skin. Angioedema can range from mild to severe. Painful welts can develop on the face. Angioedema can also occur on other parts of the body. In severe cases, the inside of the throat can swell and make it hard to breathe. Many things can cause this condition, including foods, insect bites, and medicines (such as aspirin and some blood pressure medicines). It also can run in families. Sometimes you may know what caused the reaction, but other times you may not know. Follow-up care is a key part of your treatment and safety. Be sure to make and go to all appointments, and call your doctor if you are having problems. It's also a good idea to know your test results and keep a list of the medicines you take. How can you care for yourself at home? · Take your medicines exactly as prescribed. Call your doctor if you think you are having a problem with your medicine. You will get more details on the specific medicines your doctor prescribes. Some medicines used to treat angioedema can make you too sleepy to drive safely. Do not drive if you take medicine that may make you sleepy. · Avoid foods or medicine that may have triggered the swelling. · For comfort:  ? Try taking a cool bath. Or place a cool, wet towel on the swollen area. ? Avoid hot baths and showers. ? Wear loose clothing. · Your doctor may prescribe a shot of epinephrine to carry with you in case you have a severe reaction. Learn how to give yourself the shot and keep it with you at all times. Make sure it has not . When should you call for help? Give an epinephrine shot if:    · You think you are having a severe allergic reaction.    After giving an epinephrine shot call 911, even if you feel better. Call 911 if:    · You have symptoms of a severe allergic reaction. These may include:  ? Sudden raised, red areas (hives) all over your body. ? Swelling of the throat, mouth, lips, or tongue. ? Trouble breathing. ? Passing out (losing consciousness). Or you may feel very lightheaded or suddenly feel weak, confused, or restless.     · You have been given an epinephrine shot, even if you feel better. Call your doctor now or seek immediate medical care if:    · You have symptoms of an allergic reaction, such as:  ? A rash or hives (raised, red areas on the skin). ? Itching. ? Swelling. ? Belly pain, nausea, or vomiting. Watch closely for changes in your health, and be sure to contact your doctor if:    · You do not get better as expected. Where can you learn more? Go to https://Sapato.ru.[employer]. org and sign in to your [employer] account. Enter J986 in the [employer] box to learn more about \"Angioedema: Care Instructions. \"     If you do not have an account, please click on the \"Sign Up Now\" link. Current as of: February 10, 2021               Content Version: 12.9  © 2006-2021 [employer]. Care instructions adapted under license by [facility] ([facility]). If you have questions about a medical condition or this instruction, always ask your healthcare professional. [place] [address] any warranty or liability for your use of this information. Patient Education        Insect Stings and Bites: Care Instructions  Your Care Instructions  Stings and bites from bees, wasps, ants, and other insects often cause pain, swelling, redness, and itching. In some people, especially children, the redness and swelling may be worse. It may extend several inches beyond the affected area. But in most cases, stings and bites don't cause reactions all over the body.   If you have had a reaction to an insect sting or bite, you are at risk for a reaction if you get stung or bitten again. Follow-up care is a key part of your treatment and safety. Be sure to make and go to all appointments, and call your doctor if you are having problems. It's also a good idea to know your test results and keep a list of the medicines you take. How can you care for yourself at home? · Do not scratch or rub the skin where the sting or bite occurred. · Put a cold pack or ice cube on the area. Put a thin cloth between the ice and your skin. For some people, a paste of baking soda mixed with a little water helps relieve pain and decrease the reaction. · Take an over-the-counter antihistamine, such as diphenhydramine (Benadryl) or loratadine (Claritin), to relieve swelling, redness, and itching. Calamine lotion or hydrocortisone cream may also help. Do not give antihistamines to your child unless you have checked with the doctor first.  · Be safe with medicines. If your doctor prescribed medicine for your allergy, take it exactly as prescribed. Call your doctor if you think you are having a problem with your medicine. You will get more details on the specific medicines your doctor prescribes. · Your doctor may prescribe a shot of epinephrine to carry with you in case you have a severe reaction. Learn how and when to give yourself the shot, and keep it with you at all times. Make sure it has not . · Go to the emergency room anytime you have a severe reaction. Go even if you have given yourself epinephrine and are feeling better. Symptoms can come back. When should you call for help? Call 911 anytime you think you may need emergency care. For example, call if:    · You have symptoms of a severe allergic reaction. These may include:  ? Sudden raised, red areas (hives) all over your body. ? Swelling of the throat, mouth, lips, or tongue. ? Trouble breathing. ? Passing out (losing consciousness).  Or you may feel very lightheaded or suddenly feel weak, confused, or restless. Call your doctor now or seek immediate medical care if:    · You have symptoms of an allergic reaction not right at the sting or bite, such as:  ? A rash or small area of hives (raised, red areas on the skin). ? Itching. ? Swelling. ? Belly pain, nausea, or vomiting.     · You have a lot of swelling around the site (such as your entire arm or leg is swollen).     · You have signs of infection, such as:  ? Increased pain, swelling, redness, or warmth around the sting. ? Red streaks leading from the area. ? Pus draining from the sting. ? A fever. Watch closely for changes in your health, and be sure to contact your doctor if:    · You do not get better as expected. Where can you learn more? Go to https://TapFwdpeHealthy Crowdfundereb.Cuculus. org and sign in to your [employer] account. Enter P390 in the [employer] box to learn more about \"Insect Stings and Bites: Care Instructions. \"     If you do not have an account, please click on the \"Sign Up Now\" link. Current as of: October 19, 2020               Content Version: 12.9  © 6381-0505 Healthwise, [facility]. Care instructions adapted under license by [facility] ([facility]). If you have questions about a medical condition or this instruction, always ask your healthcare professional. [address] any warranty or liability for your use of this information.

## 2021-08-31 ENCOUNTER — OFFICE VISIT (OUTPATIENT)
Dept: FAMILY MEDICINE CLINIC | Age: 46
End: 2021-08-31
Payer: MEDICAID

## 2021-08-31 VITALS
SYSTOLIC BLOOD PRESSURE: 136 MMHG | TEMPERATURE: 97.8 F | DIASTOLIC BLOOD PRESSURE: 88 MMHG | HEART RATE: 90 BPM | HEIGHT: 67 IN | WEIGHT: 128.8 LBS | BODY MASS INDEX: 20.21 KG/M2 | OXYGEN SATURATION: 98 %

## 2021-08-31 DIAGNOSIS — T78.3XXA ANGIOEDEMA, INITIAL ENCOUNTER: Primary | ICD-10-CM

## 2021-08-31 DIAGNOSIS — T63.441A ANAPHYLACTIC REACTION TO BEE STING, ACCIDENTAL OR UNINTENTIONAL, INITIAL ENCOUNTER: ICD-10-CM

## 2021-08-31 PROCEDURE — 4004F PT TOBACCO SCREEN RCVD TLK: CPT | Performed by: FAMILY MEDICINE

## 2021-08-31 PROCEDURE — 99214 OFFICE O/P EST MOD 30 MIN: CPT | Performed by: FAMILY MEDICINE

## 2021-08-31 PROCEDURE — G8427 DOCREV CUR MEDS BY ELIG CLIN: HCPCS | Performed by: FAMILY MEDICINE

## 2021-08-31 PROCEDURE — G8420 CALC BMI NORM PARAMETERS: HCPCS | Performed by: FAMILY MEDICINE

## 2021-08-31 ASSESSMENT — ENCOUNTER SYMPTOMS
CHEST TIGHTNESS: 0
ABDOMINAL DISTENTION: 0
ABDOMINAL PAIN: 0
PHOTOPHOBIA: 0
SHORTNESS OF BREATH: 0

## 2021-08-31 NOTE — PATIENT INSTRUCTIONS
Patient was educated to keep Claritin and Pepcid with his EpiPen and to take all 3 if needed. Follow-up yearly for refills. Patient Education        Insect Stings and Bites: Care Instructions  Your Care Instructions  Stings and bites from bees, wasps, ants, and other insects often cause pain, swelling, redness, and itching. In some people, especially children, the redness and swelling may be worse. It may extend several inches beyond the affected area. But in most cases, stings and bites don't cause reactions all over the body. If you have had a reaction to an insect sting or bite, you are at risk for a reaction if you get stung or bitten again. Follow-up care is a key part of your treatment and safety. Be sure to make and go to all appointments, and call your doctor if you are having problems. It's also a good idea to know your test results and keep a list of the medicines you take. How can you care for yourself at home? · Do not scratch or rub the skin where the sting or bite occurred. · Put a cold pack or ice cube on the area. Put a thin cloth between the ice and your skin. For some people, a paste of baking soda mixed with a little water helps relieve pain and decrease the reaction. · Take an over-the-counter antihistamine, such as diphenhydramine (Benadryl) or loratadine (Claritin), to relieve swelling, redness, and itching. Calamine lotion or hydrocortisone cream may also help. Do not give antihistamines to your child unless you have checked with the doctor first.  · Be safe with medicines. If your doctor prescribed medicine for your allergy, take it exactly as prescribed. Call your doctor if you think you are having a problem with your medicine. You will get more details on the specific medicines your doctor prescribes. · Your doctor may prescribe a shot of epinephrine to carry with you in case you have a severe reaction.  Learn how and when to give yourself the shot, and keep it with you at all times. Make sure it has not . · Go to the emergency room anytime you have a severe reaction. Go even if you have given yourself epinephrine and are feeling better. Symptoms can come back. When should you call for help? Call 911 anytime you think you may need emergency care. For example, call if:    · You have symptoms of a severe allergic reaction. These may include:  ? Sudden raised, red areas (hives) all over your body. ? Swelling of the throat, mouth, lips, or tongue. ? Trouble breathing. ? Passing out (losing consciousness). Or you may feel very lightheaded or suddenly feel weak, confused, or restless. Call your doctor now or seek immediate medical care if:    · You have symptoms of an allergic reaction not right at the sting or bite, such as:  ? A rash or small area of hives (raised, red areas on the skin). ? Itching. ? Swelling. ? Belly pain, nausea, or vomiting.     · You have a lot of swelling around the site (such as your entire arm or leg is swollen).     · You have signs of infection, such as:  ? Increased pain, swelling, redness, or warmth around the sting. ? Red streaks leading from the area. ? Pus draining from the sting. ? A fever. Watch closely for changes in your health, and be sure to contact your doctor if:    · You do not get better as expected. Where can you learn more? Go to https://ZEEF.compeeseewInventbuy.Sompharmaceuticals. org and sign in to your [employer] account. Enter P390 in the [facility] box to learn more about \"Insect Stings and Bites: Care Instructions. \"     If you do not have an account, please click on the \"Sign Up Now\" link. Current as of: 2020               Content Version: 12.9  © 5424-7291 Healthwise, Inc[employer]. Care instructions adapted under license by [facility] ([facility]).  If you have questions about a medical condition or this instruction, always ask your healthcare professional. Nini Montemayor disclaims any warranty or liability

## 2021-08-31 NOTE — PROGRESS NOTES
your medicine. You will get more details on the specific medicines your doctor prescribes. · Your doctor may prescribe a shot of epinephrine to carry with you in case you have a severe reaction. Learn how and when to give yourself the shot, and keep it with you at all times. Make sure it has not . · Go to the emergency room anytime you have a severe reaction. Go even if you have given yourself epinephrine and are feeling better. Symptoms can come back. When should you call for help? Call 911 anytime you think you may need emergency care. For example, call if:    · You have symptoms of a severe allergic reaction. These may include:  ? Sudden raised, red areas (hives) all over your body. ? Swelling of the throat, mouth, lips, or tongue. ? Trouble breathing. ? Passing out (losing consciousness). Or you may feel very lightheaded or suddenly feel weak, confused, or restless. Call your doctor now or seek immediate medical care if:    · You have symptoms of an allergic reaction not right at the sting or bite, such as:  ? A rash or small area of hives (raised, red areas on the skin). ? Itching. ? Swelling. ? Belly pain, nausea, or vomiting.     · You have a lot of swelling around the site (such as your entire arm or leg is swollen).     · You have signs of infection, such as:  ? Increased pain, swelling, redness, or warmth around the sting. ? Red streaks leading from the area. ? Pus draining from the sting. ? A fever. Watch closely for changes in your health, and be sure to contact your doctor if:    · You do not get better as expected. Where can you learn more? Go to [email or web address] org and sign in to your [employer] account. Enter P390 in the ViaCL[employer] box to learn more about \"Insect Stings and Bites: Care Instructions. \"     If you do not have an account, please click on the \"Sign Up Now\" link.   Current as of: 2020               Content Version: 12.9  © 2006-2021 Healthwise, Incorporated. Care instructions adapted under license by [facility] ([facility]). If you have questions about a medical condition or this instruction, always ask your healthcare professional. Hector Yanez any warranty or liability for your use of this information. Subjective:      Patient ID: Sang Menjivar is a 51 y.o. male who presents for:  Chief Complaint   Patient presents with   [facility] ED Follow-up     multiple Bee stings- allergic reaction       Patient feeling better. Did get resolution of symptoms with medications administered. Does have EpiPen's. Did not  Benadryl because he has a bad reaction to that. He has taken Claritin in the past successfully. They gave him Pepcid in the emergency room. Current Outpatient Medications on File Prior to Visit   Medication Sig Dispense Refill    EPINEPHrine (EPIPEN 2-BI) 0.3 MG/0.3ML SOAJ injection Use as directed for allergic reaction 1 each 1    OXcarbazepine (TRILEPTAL) 300 MG tablet Take by mouth      methylPREDNISolone (MEDROL, BI,) 4 MG tablet Take by mouth. 1 kit 0    famotidine (PEPCID) 20 MG tablet Take 1 tablet by mouth 2 times daily for 7 days 14 tablet 0    albuterol sulfate HFA (VENTOLIN HFA) 108 (90 Base) MCG/ACT inhaler Inhale 2 puffs into the lungs 4 times daily as needed for Wheezing 1 Inhaler 1    lisinopril (PRINIVIL;ZESTRIL) 5 MG tablet Take 1 tablet by mouth daily 30 tablet 5    ibuprofen (ADVIL;MOTRIN) 600 MG tablet Take 1 tablet by mouth every 6 hours as needed for Pain (Take with Tylenol, if you have persistent pain in ribs or R shoulder.) 60 tablet 0     No current facility-administered medications on file prior to visit.      Past Medical History:   Diagnosis Date    Depression     Hypertension      Past Surgical History:   Procedure Laterality Date    FRACTURE SURGERY Left     radius and ulna-- with mutliple skin grafts     Social History     Socioeconomic History    Marital status: Single     Spouse name: Not on file    Number of children: Not on file    Years of education: Not on file    Highest education level: Not on file   Occupational History    Not on file   Tobacco Use    Smoking status: Current Every Day Smoker     Packs/day: 0.50     Years: 35.00     Pack years: 17.50    Smokeless tobacco: Never Used   Vaping Use    Vaping Use: Never used   Substance and Sexual Activity    Alcohol use: Yes     Comment: 30-35 beers daily    Drug use: Yes     Types: Marijuana     Comment: occasional marijuana use    Sexual activity: Yes     Partners: Male     Comment: single male partner   Other Topics Concern    Not on file   Social History Narrative    Not on file     Social Determinants of Health     Financial Resource Strain: Low Risk     Difficulty of Paying Living Expenses: Not hard at all   Food Insecurity: No Food Insecurity    Worried About Running Out of Food in the Last Year: Never true    Rajinder of Food in the Last Year: Never true   Transportation Needs: No Transportation Needs    Lack of Transportation (Medical): No    Lack of Transportation (Non-Medical):  No   Physical Activity:     Days of Exercise per Week:     Minutes of Exercise per Session:    Stress:     Feeling of Stress :    Social Connections:     Frequency of Communication with Friends and Family:     Frequency of Social Gatherings with Friends and Family:     Attends Cheondoism Services:     Active Member of Clubs or Organizations:     Attends Club or Organization Meetings:     Marital Status:    Intimate Partner Violence:     Fear of Current or Ex-Partner:     Emotionally Abused:     Physically Abused:     Sexually Abused:      Family History   Problem Relation Age of Onset    Bipolar Disorder Sister     Bipolar Disorder Brother     Asthma Neg Hx     Cancer Neg Hx     Heart Attack Neg Hx     Heart Disease Neg Hx     High Blood Pressure Neg Hx     High Cholesterol Neg Hx  Stroke Neg Hx      Allergies:  Benadryl [diphenhydramine]    Review of Systems   Constitutional: Negative for activity change, appetite change, diaphoresis and unexpected weight change. Eyes: Negative for photophobia and visual disturbance. Respiratory: Negative for chest tightness and shortness of breath. No orthopnea   Cardiovascular: Negative for chest pain, palpitations and leg swelling. Gastrointestinal: Negative for abdominal distention and abdominal pain. Genitourinary: Negative for flank pain and frequency. Musculoskeletal: Negative for gait problem and joint swelling. Neurological: Negative for dizziness, weakness, light-headedness and headaches. Psychiatric/Behavioral: Negative for confusion. Objective:   /88   Pulse 90   Temp 97.8 °F (36.6 °C)   Ht 5' 7\" (1.702 m)   Wt 128 lb 12.8 oz (58.4 kg)   SpO2 98%   BMI 20.17 kg/m²     Physical Exam  Vitals reviewed. Constitutional:       General: He is not in acute distress. Appearance: He is well-developed. HENT:      Head: Normocephalic and atraumatic. Right Ear: External ear normal.      Left Ear: External ear normal.      Nose: Nose normal.   Eyes:      General:         Right eye: No discharge. Left eye: No discharge. Conjunctiva/sclera: Conjunctivae normal.      Pupils: Pupils are equal, round, and reactive to light. Neck:      Thyroid: No thyromegaly. Cardiovascular:      Rate and Rhythm: Normal rate and regular rhythm. Pulmonary:      Effort: Pulmonary effort is normal. No respiratory distress. Abdominal:      General: There is no distension. Musculoskeletal:      Cervical back: Neck supple. Skin:     General: Skin is warm and dry. Neurological:      Mental Status: He is alert and oriented to person, place, and time. Coordination: Coordination normal.   Psychiatric:         Thought Content:  Thought content normal.         Judgment: Judgment normal.         No results found for this visit on 08/31/21. Recent Results (from the past 2016 hour(s))   CBC Auto Differential    Collection Time: 08/28/21  2:45 PM   Result Value Ref Range    WBC 4.1 (L) 4.8 - 10.8 K/uL    RBC 4.16 (L) 4.70 - 6.10 M/uL    Hemoglobin 14.7 14.0 - 18.0 g/dL    Hematocrit 41.5 (L) 42.0 - 52.0 %    MCV 99.6 80.0 - 100.0 fL    MCH 35.4 (H) 27.0 - 31.3 pg    MCHC 35.5 33.0 - 37.0 %    RDW 13.1 11.5 - 14.5 %    Platelets 348 841 - 209 K/uL    PLATELET SLIDE REVIEW Normal     Neutrophils % 57.0 %    Lymphocytes % 34.0 %    Monocytes % 8.3 %    Eosinophils % 1.0 %    Basophils % 0.1 %    Neutrophils Absolute 2.3 1.4 - 6.5 K/uL    Lymphocytes Absolute 1.4 1.0 - 4.8 K/uL    Monocytes Absolute 0.3 0.2 - 0.8 K/uL    Eosinophils Absolute 0.0 0.0 - 0.7 K/uL    Basophils Absolute 0.0 0.0 - 0.2 K/uL    RBC Morphology Normal    SPECIMEN REJECTION    Collection Time: 08/28/21  3:28 PM   Result Value Ref Range    Rejected Test BMP     Reason for Rejection see below        [] Pt was seen by provider for      Minutes  Counseling and coordination of care was done for all assessment diagnosis listed for today with patient and any family/friend present. More than 50% of this visit was spent coordinating cuurent care, obtaining information for prior records, and counseling for current plan of action. Hospital records, laboratories, reports all reviewed with patient. Assessment:       Diagnosis Orders   1. Angioedema, initial encounter     2. Anaphylactic reaction to bee sting, accidental or unintentional, initial encounter           No orders of the defined types were placed in this encounter. No orders of the defined types were placed in this encounter. Medication List          Accurate as of August 31, 2021  3:21 PM. If you have any questions, ask your nurse or doctor.             CONTINUE taking these medications    albuterol sulfate  (90 Base) MCG/ACT inhaler  Commonly known as: Ventolin HFA  Inhale 2 puffs into the lungs 4 times daily as needed for Wheezing     EPINEPHrine 0.3 MG/0.3ML Soaj injection  Commonly known as: EpiPen 2-Bi  Use as directed for allergic reaction     famotidine 20 MG tablet  Commonly known as: PEPCID  Take 1 tablet by mouth 2 times daily for 7 days     ibuprofen 600 MG tablet  Commonly known as: ADVIL;MOTRIN  Take 1 tablet by mouth every 6 hours as needed for Pain (Take with Tylenol, if you have persistent pain in ribs or R shoulder.)     lisinopril 5 MG tablet  Commonly known as: PRINIVIL;ZESTRIL  Take 1 tablet by mouth daily     methylPREDNISolone 4 MG tablet  Commonly known as: MEDROL (BI)  Take by mouth. Trileptal 300 MG tablet  Generic drug: OXcarbazepine              Plan:   Return in about 1 year (around 8/31/2022) for for review of outcome of today's recommendation. Patient Instructions     Patient was educated to keep Claritin and Pepcid with his EpiPen and to take all 3 if needed. Follow-up yearly for refills. Patient Education        Insect Stings and Bites: Care Instructions  Your Care Instructions  Stings and bites from bees, wasps, ants, and other insects often cause pain, swelling, redness, and itching. In some people, especially children, the redness and swelling may be worse. It may extend several inches beyond the affected area. But in most cases, stings and bites don't cause reactions all over the body. If you have had a reaction to an insect sting or bite, you are at risk for a reaction if you get stung or bitten again. Follow-up care is a key part of your treatment and safety. Be sure to make and go to all appointments, and call your doctor if you are having problems. It's also a good idea to know your test results and keep a list of the medicines you take. How can you care for yourself at home? · Do not scratch or rub the skin where the sting or bite occurred. · Put a cold pack or ice cube on the area.  Put a thin cloth between the ice and your skin. For some people, a paste of baking soda mixed with a little water helps relieve pain and decrease the reaction. · Take an over-the-counter antihistamine, such as diphenhydramine (Benadryl) or loratadine (Claritin), to relieve swelling, redness, and itching. Calamine lotion or hydrocortisone cream may also help. Do not give antihistamines to your child unless you have checked with the doctor first.  · Be safe with medicines. If your doctor prescribed medicine for your allergy, take it exactly as prescribed. Call your doctor if you think you are having a problem with your medicine. You will get more details on the specific medicines your doctor prescribes. · Your doctor may prescribe a shot of epinephrine to carry with you in case you have a severe reaction. Learn how and when to give yourself the shot, and keep it with you at all times. Make sure it has not . · Go to the emergency room anytime you have a severe reaction. Go even if you have given yourself epinephrine and are feeling better. Symptoms can come back. When should you call for help? Call 911 anytime you think you may need emergency care. For example, call if:    · You have symptoms of a severe allergic reaction. These may include:  ? Sudden raised, red areas (hives) all over your body. ? Swelling of the throat, mouth, lips, or tongue. ? Trouble breathing. ? Passing out (losing consciousness). Or you may feel very lightheaded or suddenly feel weak, confused, or restless. Call your doctor now or seek immediate medical care if:    · You have symptoms of an allergic reaction not right at the sting or bite, such as:  ? A rash or small area of hives (raised, red areas on the skin). ? Itching. ? Swelling. ?  Belly pain, nausea, or vomiting.     · You have a lot of swelling around the site (such as your entire arm or leg is swollen).     · You have signs of infection, such as:  ? Increased pain, swelling, redness, or warmth around the sting. ? Red streaks leading from the area. ? Pus draining from the sting. ? A fever. Watch closely for changes in your health, and be sure to contact your doctor if:    · You do not get better as expected. Where can you learn more? Go to [email or web address] and sign in to your [employer] account. Enter P390 in the [employer] box to learn more about \"Insect Stings and Bites: Care Instructions. \"     If you do not have an account, please click on the \"Sign Up Now\" link. Current as of: October 19, 2020               Content Version: 12.9  © 5739-7966 Healthwise, [facility]. Care instructions adapted under license by [facility] ([facility]). If you have questions about a medical condition or this instruction, always ask your healthcare professional. [address] any warranty or liability for your use of this information. This note was partially created with the assistance of dictation. This may lead to grammatical or spelling errors. Benoit Bro M.D.

## 2021-09-21 ENCOUNTER — TELEPHONE (OUTPATIENT)
Dept: FAMILY MEDICINE CLINIC | Age: 46
End: 2021-09-21

## 2021-09-21 DIAGNOSIS — R79.89 ABNORMAL LIVER FUNCTION TEST: ICD-10-CM

## 2021-09-21 DIAGNOSIS — E55.9 VITAMIN D DEFICIENCY: ICD-10-CM

## 2021-09-21 DIAGNOSIS — E87.1 HYPONATREMIA: Primary | ICD-10-CM

## 2021-09-21 NOTE — TELEPHONE ENCOUNTER
Pt came in for labs to be completed. No active labs. These were discontinued  Vitamin D 25 hydroxy  Gamma Gt  BMP  AST  ALT    Would you like me to just reorder these? Anything else?

## 2021-09-30 ENCOUNTER — OFFICE VISIT (OUTPATIENT)
Dept: FAMILY MEDICINE CLINIC | Age: 46
End: 2021-09-30
Payer: MEDICAID

## 2021-09-30 VITALS
WEIGHT: 125 LBS | HEIGHT: 67 IN | HEART RATE: 91 BPM | TEMPERATURE: 97.8 F | OXYGEN SATURATION: 98 % | BODY MASS INDEX: 19.62 KG/M2 | SYSTOLIC BLOOD PRESSURE: 138 MMHG | DIASTOLIC BLOOD PRESSURE: 72 MMHG

## 2021-09-30 DIAGNOSIS — E87.1 HYPONATREMIA: Primary | ICD-10-CM

## 2021-09-30 DIAGNOSIS — F10.10 ALCOHOL ABUSE: ICD-10-CM

## 2021-09-30 DIAGNOSIS — R79.89 ABNORMAL LIVER FUNCTION TEST: ICD-10-CM

## 2021-09-30 DIAGNOSIS — I10 ESSENTIAL HYPERTENSION: ICD-10-CM

## 2021-09-30 PROCEDURE — G8420 CALC BMI NORM PARAMETERS: HCPCS | Performed by: FAMILY MEDICINE

## 2021-09-30 PROCEDURE — 4004F PT TOBACCO SCREEN RCVD TLK: CPT | Performed by: FAMILY MEDICINE

## 2021-09-30 PROCEDURE — 99214 OFFICE O/P EST MOD 30 MIN: CPT | Performed by: FAMILY MEDICINE

## 2021-09-30 PROCEDURE — G8427 DOCREV CUR MEDS BY ELIG CLIN: HCPCS | Performed by: FAMILY MEDICINE

## 2021-09-30 RX ORDER — BUSPIRONE HYDROCHLORIDE 10 MG/1
TABLET ORAL
COMMUNITY
Start: 2021-09-01 | End: 2022-01-25 | Stop reason: SDUPTHER

## 2021-09-30 RX ORDER — CLONAZEPAM 0.5 MG/1
0.5 TABLET ORAL 3 TIMES DAILY PRN
Qty: 15 TABLET | Refills: 1 | Status: SHIPPED | OUTPATIENT
Start: 2021-09-30 | End: 2021-11-30 | Stop reason: ALTCHOICE

## 2021-09-30 NOTE — PROGRESS NOTES
Return in about 3 months (around 9/30/2021) for for routine major medical condition management. Patient Instructions   Anticipate blood pressure improvement 1 bronchitis is resolved and cold medication decreased usage.

## 2021-09-30 NOTE — PATIENT INSTRUCTIONS
Patient has been advised on alcohol withdrawal symptoms and how to use clonazepam to help modify that. Clonazepam has a very long half-life so he should watch for sedation and use the medication no more than 3 times a day. Repeat labs in 1 to 2 weeks after alcohol withdrawal is complete. Sodium and GGT should normalize at that time. Blood pressure normal at this time.

## 2021-09-30 NOTE — PROGRESS NOTES
Diagnosis Orders   1. Hyponatremia     2. Essential hypertension     3. Abnormal liver function test  Gamma Gt    Hepatic Function Panel    Basic Metabolic Panel   4. Alcohol abuse  clonazePAM (KLONOPIN) 0.5 MG tablet     Return in about 2 weeks (around 10/14/2021). Patient Instructions   Patient has been advised on alcohol withdrawal symptoms and how to use clonazepam to help modify that. Clonazepam has a very long half-life so he should watch for sedation and use the medication no more than 3 times a day. Repeat labs in 1 to 2 weeks after alcohol withdrawal is complete. Sodium and GGT should normalize at that time. Blood pressure normal at this time. Subjective:      Patient ID: Carin Walker is a 51 y.o. male who presents for:  Chief Complaint   Patient presents with    Hypertension     x 3 month check up     Other     pt has had poor appetite for the last 3 day  from [place] was able to eat this morning , is shaking     Discuss Labs       Reviewed patient's drinking history laboratories and symptoms. No symptoms of hyponatremia or liver function abnormality. Patient has stopped smoking recently due to a bronchitis and has not been drinking due to lack of sense of taste. He has been without alcohol for 3 days. He normally drinks 10-15 beverages a day.       Current Outpatient Medications on File Prior to Visit   Medication Sig Dispense Refill    busPIRone (BUSPAR) 10 MG tablet TAKE 1 TABLET BY MOUTH TWICE DAILY      lisinopril (PRINIVIL;ZESTRIL) 5 MG tablet Take 1 tablet by mouth once daily 30 tablet 0    EPINEPHrine (EPIPEN 2-BI) 0.3 MG/0.3ML SOAJ injection Use as directed for allergic reaction 1 each 1    OXcarbazepine (TRILEPTAL) 300 MG tablet Take by mouth      famotidine (PEPCID) 20 MG tablet Take 1 tablet by mouth 2 times daily for 7 days 14 tablet 0    albuterol sulfate HFA (VENTOLIN HFA) 108 (90 Base) MCG/ACT inhaler Inhale 2 puffs into the lungs 4 times daily as needed for Wheezing 1 Inhaler 1    ibuprofen (ADVIL;MOTRIN) 600 MG tablet Take 1 tablet by mouth every 6 hours as needed for Pain (Take with Tylenol, if you have persistent pain in ribs or R shoulder.) 60 tablet 0     No current facility-administered medications on file prior to visit. Past Medical History:   Diagnosis Date    Depression     Hypertension      Past Surgical History:   Procedure Laterality Date    FRACTURE SURGERY Left     radius and ulna-- with mutliple skin grafts     Social History     Socioeconomic History    Marital status: Single     Spouse name: Not on file    Number of children: Not on file    Years of education: Not on file    Highest education level: Not on file   Occupational History    Not on file   Tobacco Use    Smoking status: Current Every Day Smoker     Packs/day: 0.50     Years: 35.00     Pack years: 17.50    Smokeless tobacco: Never Used   Vaping Use    Vaping Use: Never used   Substance and Sexual Activity    Alcohol use: Yes     Comment: 30-35 beers daily    Drug use: Yes     Types: Marijuana     Comment: occasional marijuana use    Sexual activity: Yes     Partners: Male     Comment: single male partner   Other Topics Concern    Not on file   Social History Narrative    Not on file     Social Determinants of Health     Financial Resource Strain: Low Risk     Difficulty of Paying Living Expenses: Not hard at all   Food Insecurity: No Food Insecurity    Worried About Running Out of Food in the Last Year: Never true    Rajindre of Food in the Last Year: Never true   Transportation Needs: No Transportation Needs    Lack of Transportation (Medical): No    Lack of Transportation (Non-Medical):  No   Physical Activity:     Days of Exercise per Week:     Minutes of Exercise per Session:    Stress:     Feeling of Stress :    Social Connections:     Frequency of Communication with Friends and Family:     Frequency of Social Gatherings with Friends and Family:     Attends Rate and Rhythm: Normal rate and regular rhythm. Pulmonary:      Effort: Pulmonary effort is normal. No respiratory distress. Abdominal:      General: There is no distension. Musculoskeletal:      Cervical back: Neck supple. Skin:     General: Skin is warm and dry. Neurological:      Mental Status: He is alert and oriented to person, place, and time. Coordination: Coordination normal.   Psychiatric:         Thought Content: Thought content normal.         Judgment: Judgment normal.         No results found for this visit on 09/30/21.     Recent Results (from the past 2016 hour(s))   CBC Auto Differential    Collection Time: 08/28/21  2:45 PM   Result Value Ref Range    WBC 4.1 (L) 4.8 - 10.8 K/uL    RBC 4.16 (L) 4.70 - 6.10 M/uL    Hemoglobin 14.7 14.0 - 18.0 g/dL    Hematocrit 41.5 (L) 42.0 - 52.0 %    MCV 99.6 80.0 - 100.0 fL    MCH 35.4 (H) 27.0 - 31.3 pg    MCHC 35.5 33.0 - 37.0 %    RDW 13.1 11.5 - 14.5 %    Platelets 636 255 - 086 K/uL    PLATELET SLIDE REVIEW Normal     Neutrophils % 57.0 %    Lymphocytes % 34.0 %    Monocytes % 8.3 %    Eosinophils % 1.0 %    Basophils % 0.1 %    Neutrophils Absolute 2.3 1.4 - 6.5 K/uL    Lymphocytes Absolute 1.4 1.0 - 4.8 K/uL    Monocytes Absolute 0.3 0.2 - 0.8 K/uL    Eosinophils Absolute 0.0 0.0 - 0.7 K/uL    Basophils Absolute 0.0 0.0 - 0.2 K/uL    RBC Morphology Normal    SPECIMEN REJECTION    Collection Time: 08/28/21  3:28 PM   Result Value Ref Range    Rejected Test BMP     Reason for Rejection see below    Vitamin D 25 Hydroxy    Collection Time: 09/28/21  7:35 PM   Result Value Ref Range    Vit D, 25-Hydroxy 60.0 30.0 - 100.0 ng/mL   Gamma Gt    Collection Time: 09/28/21  7:35 PM   Result Value Ref Range     (H) 0 - 60 U/L   Basic Metabolic Panel    Collection Time: 09/28/21  7:35 PM   Result Value Ref Range    Sodium 125 (L) 135 - 144 mEq/L    Potassium 4.3 3.4 - 4.9 mEq/L    Chloride 84 (L) 95 - 107 mEq/L    CO2 24 20 - 31 mEq/L    Anion Gap 17 (H) 9 - 15 mEq/L    Glucose 72 70 - 99 mg/dL    BUN 4 (L) 6 - 20 mg/dL    CREATININE 0.48 (L) 0.70 - 1.20 mg/dL    GFR Non-African American >60.0 >60    GFR  >60.0 >60    Calcium 8.9 8.5 - 9.9 mg/dL   AST    Collection Time: 09/28/21  7:35 PM   Result Value Ref Range    AST 85 (H) 0 - 40 U/L   ALT    Collection Time: 09/28/21  7:35 PM   Result Value Ref Range    ALT 42 (H) 0 - 41 U/L       [] Pt was seen by provider for      Minutes  Counseling and coordination of care was done for all assessment diagnosis listed for today with patient and any family/friend present. More than 50% of this visit was spent coordinating cuurent care, obtaining information for prior records, and counseling for current plan of action. Assessment:       Diagnosis Orders   1. Hyponatremia     2. Essential hypertension     3. Abnormal liver function test  Gamma Gt    Hepatic Function Panel    Basic Metabolic Panel   4. Alcohol abuse  clonazePAM (KLONOPIN) 0.5 MG tablet         Orders Placed This Encounter   Procedures    Gamma Gt     Standing Status:   Future     Standing Expiration Date:   9/30/2022    Hepatic Function Panel     Standing Status:   Future     Standing Expiration Date:   9/30/2022    Basic Metabolic Panel     Standing Status:   Future     Standing Expiration Date:   9/30/2022       Orders Placed This Encounter   Medications    clonazePAM (KLONOPIN) 0.5 MG tablet     Sig: Take 1 tablet by mouth 3 times daily as needed (based on withdrawal symptoms) for up to 10 days. Dispense:  15 tablet     Refill:  1          Medication List          Accurate as of September 30, 2021 11:59 PM. If you have any questions, ask your nurse or doctor. START taking these medications    clonazePAM 0.5 MG tablet  Commonly known as: KlonoPIN  Take 1 tablet by mouth 3 times daily as needed (based on withdrawal symptoms) for up to 10 days.   Started by: Vishal Han MD        CONTINUE taking these medications    albuterol sulfate  (90 Base) MCG/ACT inhaler  Commonly known as: Ventolin HFA  Inhale 2 puffs into the lungs 4 times daily as needed for Wheezing     busPIRone 10 MG tablet  Commonly known as: BUSPAR     EPINEPHrine 0.3 MG/0.3ML Soaj injection  Commonly known as: EpiPen 2-Corey  Use as directed for allergic reaction     famotidine 20 MG tablet  Commonly known as: PEPCID  Take 1 tablet by mouth 2 times daily for 7 days     ibuprofen 600 MG tablet  Commonly known as: ADVIL;MOTRIN  Take 1 tablet by mouth every 6 hours as needed for Pain (Take with Tylenol, if you have persistent pain in ribs or R shoulder.)     lisinopril 5 MG tablet  Commonly known as: PRINIVIL;ZESTRIL  Take 1 tablet by mouth once daily     Trileptal 300 MG tablet  Generic drug: OXcarbazepine           Where to Get Your Medications      These medications were sent to [address] [place] [address]    Phone: [phone]   · clonazePAM 0.5 MG tablet           Plan:   Return in about 2 weeks (around 10/14/2021). Patient Instructions   Patient has been advised on alcohol withdrawal symptoms and how to use clonazepam to help modify that. Clonazepam has a very long half-life so he should watch for sedation and use the medication no more than 3 times a day. Repeat labs in 1 to 2 weeks after alcohol withdrawal is complete. Sodium and GGT should normalize at that time. Blood pressure normal at this time. This note was partially created with the assistance of dictation. This may lead to grammatical or spelling errors. Michele L. Frank Kayser, M.D.

## 2021-10-01 ASSESSMENT — ENCOUNTER SYMPTOMS
CHEST TIGHTNESS: 0
ABDOMINAL PAIN: 0
ABDOMINAL DISTENTION: 0
PHOTOPHOBIA: 0
SHORTNESS OF BREATH: 0

## 2021-10-16 DIAGNOSIS — I10 UNCONTROLLED HYPERTENSION: ICD-10-CM

## 2021-10-18 DIAGNOSIS — R79.89 ABNORMAL LIVER FUNCTION TEST: ICD-10-CM

## 2021-10-18 LAB
ALBUMIN SERPL-MCNC: 4.4 G/DL (ref 3.5–4.6)
ALP BLD-CCNC: 82 U/L (ref 35–104)
ALT SERPL-CCNC: 19 U/L (ref 0–41)
ANION GAP SERPL CALCULATED.3IONS-SCNC: 11 MEQ/L (ref 9–15)
AST SERPL-CCNC: 19 U/L (ref 0–40)
BILIRUB SERPL-MCNC: <0.2 MG/DL (ref 0.2–0.7)
BILIRUBIN DIRECT: <0.2 MG/DL (ref 0–0.4)
BILIRUBIN, INDIRECT: NORMAL MG/DL (ref 0–0.6)
BUN BLDV-MCNC: 11 MG/DL (ref 6–20)
CALCIUM SERPL-MCNC: 8.8 MG/DL (ref 8.5–9.9)
CHLORIDE BLD-SCNC: 86 MEQ/L (ref 95–107)
CO2: 26 MEQ/L (ref 20–31)
CREAT SERPL-MCNC: 0.67 MG/DL (ref 0.7–1.2)
GAMMA GLUTAMYL TRANSFERASE: 70 U/L (ref 0–60)
GFR AFRICAN AMERICAN: >60
GFR NON-AFRICAN AMERICAN: >60
GLUCOSE BLD-MCNC: 75 MG/DL (ref 70–99)
POTASSIUM SERPL-SCNC: 3.3 MEQ/L (ref 3.4–4.9)
SODIUM BLD-SCNC: 123 MEQ/L (ref 135–144)
TOTAL PROTEIN: 6.4 G/DL (ref 6.3–8)

## 2021-10-18 RX ORDER — LISINOPRIL 5 MG/1
TABLET ORAL
Qty: 30 TABLET | Refills: 0 | Status: SHIPPED | OUTPATIENT
Start: 2021-10-18 | End: 2021-11-15

## 2021-10-21 ENCOUNTER — OFFICE VISIT (OUTPATIENT)
Dept: FAMILY MEDICINE CLINIC | Age: 46
End: 2021-10-21
Payer: MEDICAID

## 2021-10-21 VITALS
TEMPERATURE: 97.1 F | HEART RATE: 87 BPM | DIASTOLIC BLOOD PRESSURE: 70 MMHG | HEIGHT: 67 IN | BODY MASS INDEX: 19.62 KG/M2 | WEIGHT: 125 LBS | OXYGEN SATURATION: 98 % | SYSTOLIC BLOOD PRESSURE: 108 MMHG

## 2021-10-21 DIAGNOSIS — I10 ESSENTIAL HYPERTENSION: ICD-10-CM

## 2021-10-21 DIAGNOSIS — E87.1 HYPONATREMIA: Primary | ICD-10-CM

## 2021-10-21 DIAGNOSIS — R79.89 ABNORMAL LIVER FUNCTION TEST: ICD-10-CM

## 2021-10-21 DIAGNOSIS — F10.10 ALCOHOL ABUSE: ICD-10-CM

## 2021-10-21 PROCEDURE — G8420 CALC BMI NORM PARAMETERS: HCPCS | Performed by: FAMILY MEDICINE

## 2021-10-21 PROCEDURE — G8427 DOCREV CUR MEDS BY ELIG CLIN: HCPCS | Performed by: FAMILY MEDICINE

## 2021-10-21 PROCEDURE — 99214 OFFICE O/P EST MOD 30 MIN: CPT | Performed by: FAMILY MEDICINE

## 2021-10-21 PROCEDURE — 4004F PT TOBACCO SCREEN RCVD TLK: CPT | Performed by: FAMILY MEDICINE

## 2021-10-21 PROCEDURE — G8484 FLU IMMUNIZE NO ADMIN: HCPCS | Performed by: FAMILY MEDICINE

## 2021-10-21 ASSESSMENT — ENCOUNTER SYMPTOMS
CHEST TIGHTNESS: 0
SHORTNESS OF BREATH: 0
PHOTOPHOBIA: 0
ABDOMINAL DISTENTION: 0
ABDOMINAL PAIN: 0

## 2021-10-21 NOTE — PROGRESS NOTES
Return in about 2 weeks (around 10/14/2021). Patient Instructions   Patient has been advised on alcohol withdrawal symptoms and how to use clonazepam to help modify that. Clonazepam has a very long half-life so he should watch for sedation and use the medication no more than 3 times a day. Repeat labs in 1 to 2 weeks after alcohol withdrawal is complete. Sodium and GGT should normalize at that time. Blood pressure normal at this time.

## 2021-10-21 NOTE — PATIENT INSTRUCTIONS
Pt will continue with alcohol avoidance. He is down to 3 cigarettes a day. He will continue to work on that. Its significant improvement. Discussed the improved results of the liver function. Patient is happy with that. Should not need clonazepam any longer but does have it. Hyponatremia is likely due to his Trileptal because patient is not symptomatic. We will continue to monitor. Blood pressure under good control. His monitor does not read his pressure accurately so he is going to attempt to return it.

## 2021-10-21 NOTE — PROGRESS NOTES
OXcarbazepine (TRILEPTAL) 300 MG tablet Take by mouth      famotidine (PEPCID) 20 MG tablet Take 1 tablet by mouth 2 times daily for 7 days 14 tablet 0    albuterol sulfate HFA (VENTOLIN HFA) 108 (90 Base) MCG/ACT inhaler Inhale 2 puffs into the lungs 4 times daily as needed for Wheezing 1 Inhaler 1    ibuprofen (ADVIL;MOTRIN) 600 MG tablet Take 1 tablet by mouth every 6 hours as needed for Pain (Take with Tylenol, if you have persistent pain in ribs or R shoulder.) 60 tablet 0     No current facility-administered medications on file prior to visit. Past Medical History:   Diagnosis Date    Depression     Hypertension      Past Surgical History:   Procedure Laterality Date    FRACTURE SURGERY Left     radius and ulna-- with mutliple skin grafts     Social History     Socioeconomic History    Marital status: Single     Spouse name: Not on file    Number of children: Not on file    Years of education: Not on file    Highest education level: Not on file   Occupational History    Not on file   Tobacco Use    Smoking status: Current Every Day Smoker     Packs/day: 0.50     Years: 35.00     Pack years: 17.50    Smokeless tobacco: Never Used   Vaping Use    Vaping Use: Never used   Substance and Sexual Activity    Alcohol use: Yes     Comment: 30-35 beers daily    Drug use: Yes     Types: Marijuana     Comment: occasional marijuana use    Sexual activity: Yes     Partners: Male     Comment: single male partner   Other Topics Concern    Not on file   Social History Narrative    Not on file     Social Determinants of Health     Financial Resource Strain: Low Risk     Difficulty of Paying Living Expenses: Not hard at all   Food Insecurity: No Food Insecurity    Worried About Running Out of Food in the Last Year: Never true    Rajinder of Food in the Last Year: Never true   Transportation Needs: No Transportation Needs    Lack of Transportation (Medical):  No    Lack of Transportation (Non-Medical): No   Physical Activity:     Days of Exercise per Week:     Minutes of Exercise per Session:    Stress:     Feeling of Stress :    Social Connections:     Frequency of Communication with Friends and Family:     Frequency of Social Gatherings with Friends and Family:     Attends Hinduism Services:     Active Member of Clubs or Organizations:     Attends Club or Organization Meetings:     Marital Status:    Intimate Partner Violence:     Fear of Current or Ex-Partner:     Emotionally Abused:     Physically Abused:     Sexually Abused:      Family History   Problem Relation Age of Onset    Bipolar Disorder Sister     Bipolar Disorder Brother     Asthma Neg Hx     Cancer Neg Hx     Heart Attack Neg Hx     Heart Disease Neg Hx     High Blood Pressure Neg Hx     High Cholesterol Neg Hx     Stroke Neg Hx      Allergies:  Benadryl [diphenhydramine]    Review of Systems   Constitutional: Negative for activity change, appetite change, diaphoresis and unexpected weight change. Eyes: Negative for photophobia and visual disturbance. Respiratory: Negative for chest tightness and shortness of breath. No orthopnea   Cardiovascular: Negative for chest pain, palpitations and leg swelling. Gastrointestinal: Negative for abdominal distention and abdominal pain. Genitourinary: Negative for flank pain and frequency. Musculoskeletal: Negative for gait problem and joint swelling. Neurological: Negative for dizziness, weakness, light-headedness and headaches. Psychiatric/Behavioral: Negative for confusion. Objective:   /70 Comment: 131/87 pt cuff  Pulse 87   Temp 97.1 °F (36.2 °C)   Ht 5' 7\" (1.702 m)   Wt 125 lb (56.7 kg)   SpO2 98%   BMI 19.58 kg/m²     Physical Exam  Vitals reviewed. Constitutional:       General: He is not in acute distress. Appearance: He is well-developed. HENT:      Head: Normocephalic and atraumatic.       Right Ear: External ear normal.      Left Ear: External ear normal.      Nose: Nose normal.   Eyes:      General:         Right eye: No discharge. Left eye: No discharge. Conjunctiva/sclera: Conjunctivae normal.      Pupils: Pupils are equal, round, and reactive to light. Neck:      Thyroid: No thyromegaly. Cardiovascular:      Rate and Rhythm: Normal rate and regular rhythm. Pulmonary:      Effort: Pulmonary effort is normal. No respiratory distress. Abdominal:      General: There is no distension. Musculoskeletal:      Cervical back: Neck supple. Skin:     General: Skin is warm and dry. Neurological:      Mental Status: He is alert and oriented to person, place, and time. Coordination: Coordination normal.   Psychiatric:         Thought Content: Thought content normal.         Judgment: Judgment normal.         No results found for this visit on 10/21/21.     Recent Results (from the past 2016 hour(s))   CBC Auto Differential    Collection Time: 08/28/21  2:45 PM   Result Value Ref Range    WBC 4.1 (L) 4.8 - 10.8 K/uL    RBC 4.16 (L) 4.70 - 6.10 M/uL    Hemoglobin 14.7 14.0 - 18.0 g/dL    Hematocrit 41.5 (L) 42.0 - 52.0 %    MCV 99.6 80.0 - 100.0 fL    MCH 35.4 (H) 27.0 - 31.3 pg    MCHC 35.5 33.0 - 37.0 %    RDW 13.1 11.5 - 14.5 %    Platelets 025 717 - 961 K/uL    PLATELET SLIDE REVIEW Normal     Neutrophils % 57.0 %    Lymphocytes % 34.0 %    Monocytes % 8.3 %    Eosinophils % 1.0 %    Basophils % 0.1 %    Neutrophils Absolute 2.3 1.4 - 6.5 K/uL    Lymphocytes Absolute 1.4 1.0 - 4.8 K/uL    Monocytes Absolute 0.3 0.2 - 0.8 K/uL    Eosinophils Absolute 0.0 0.0 - 0.7 K/uL    Basophils Absolute 0.0 0.0 - 0.2 K/uL    RBC Morphology Normal    SPECIMEN REJECTION    Collection Time: 08/28/21  3:28 PM   Result Value Ref Range    Rejected Test BMP     Reason for Rejection see below    Vitamin D 25 Hydroxy    Collection Time: 09/28/21  7:35 PM   Result Value Ref Range    Vit D, 25-Hydroxy 60.0 30.0 - 100.0 ng/mL   Gamma Gt Collection Time: 09/28/21  7:35 PM   Result Value Ref Range     (H) 0 - 60 U/L   Basic Metabolic Panel    Collection Time: 09/28/21  7:35 PM   Result Value Ref Range    Sodium 125 (L) 135 - 144 mEq/L    Potassium 4.3 3.4 - 4.9 mEq/L    Chloride 84 (L) 95 - 107 mEq/L    CO2 24 20 - 31 mEq/L    Anion Gap 17 (H) 9 - 15 mEq/L    Glucose 72 70 - 99 mg/dL    BUN 4 (L) 6 - 20 mg/dL    CREATININE 0.48 (L) 0.70 - 1.20 mg/dL    GFR Non-African American >60.0 >60    GFR  >60.0 >60    Calcium 8.9 8.5 - 9.9 mg/dL   AST    Collection Time: 09/28/21  7:35 PM   Result Value Ref Range    AST 85 (H) 0 - 40 U/L   ALT    Collection Time: 09/28/21  7:35 PM   Result Value Ref Range    ALT 42 (H) 0 - 41 U/L   Basic Metabolic Panel    Collection Time: 10/18/21  1:10 PM   Result Value Ref Range    Sodium 123 (L) 135 - 144 mEq/L    Potassium 3.3 (L) 3.4 - 4.9 mEq/L    Chloride 86 (L) 95 - 107 mEq/L    CO2 26 20 - 31 mEq/L    Anion Gap 11 9 - 15 mEq/L    Glucose 75 70 - 99 mg/dL    BUN 11 6 - 20 mg/dL    CREATININE 0.67 (L) 0.70 - 1.20 mg/dL    GFR Non-African American >60.0 >60    GFR  >60.0 >60    Calcium 8.8 8.5 - 9.9 mg/dL   Hepatic Function Panel    Collection Time: 10/18/21  1:10 PM   Result Value Ref Range    Total Protein 6.4 6.3 - 8.0 g/dL    Albumin 4.4 3.5 - 4.6 g/dL    Alkaline Phosphatase 82 35 - 104 U/L    ALT 19 0 - 41 U/L    AST 19 0 - 40 U/L    Total Bilirubin <0.2 0.2 - 0.7 mg/dL    Bilirubin, Direct <0.2 0.0 - 0.4 mg/dL    Bilirubin, Indirect see below 0.0 - 0.6 mg/dL   Gamma Gt    Collection Time: 10/18/21  1:10 PM   Result Value Ref Range    GGT 70 (H) 0 - 60 U/L       [] Pt was seen by provider for      Minutes  Counseling and coordination of care was done for all assessment diagnosis listed for today with patient and any family/friend present.    More than 50% of this visit was spent coordinating cuurent care, obtaining information for prior records, and counseling for current plan of action. Assessment:       Diagnosis Orders   1. Hyponatremia  Comprehensive Metabolic Panel   2. Essential hypertension     3. Alcohol abuse  Gamma Gt    Comprehensive Metabolic Panel   4. Abnormal liver function test  Gamma Gt    Comprehensive Metabolic Panel         Orders Placed This Encounter   Procedures    Gamma Gt     Standing Status:   Future     Standing Expiration Date:   10/21/2022    Comprehensive Metabolic Panel     Standing Status:   Future     Standing Expiration Date:   10/21/2022       No orders of the defined types were placed in this encounter. Medication List          Accurate as of October 21, 2021 11:34 AM. If you have any questions, ask your nurse or doctor. CONTINUE taking these medications    albuterol sulfate  (90 Base) MCG/ACT inhaler  Commonly known as: Ventolin HFA  Inhale 2 puffs into the lungs 4 times daily as needed for Wheezing     busPIRone 10 MG tablet  Commonly known as: BUSPAR     clonazePAM 0.5 MG tablet  Commonly known as: KlonoPIN  Take 1 tablet by mouth 3 times daily as needed (based on withdrawal symptoms) for up to 10 days. EPINEPHrine 0.3 MG/0.3ML Soaj injection  Commonly known as: EpiPen 2-Corey  Use as directed for allergic reaction     famotidine 20 MG tablet  Commonly known as: PEPCID  Take 1 tablet by mouth 2 times daily for 7 days     ibuprofen 600 MG tablet  Commonly known as: ADVIL;MOTRIN  Take 1 tablet by mouth every 6 hours as needed for Pain (Take with Tylenol, if you have persistent pain in ribs or R shoulder.)     lisinopril 5 MG tablet  Commonly known as: PRINIVIL;ZESTRIL  Take 1 tablet by mouth once daily     Trileptal 300 MG tablet  Generic drug: OXcarbazepine              Plan:   Return in about 3 months (around 1/21/2022) for for review of outcome of today's recommendation. Patient Instructions   Pt will continue with alcohol avoidance. He is down to 3 cigarettes a day. He will continue to work on that.   Its significant improvement. Discussed the improved results of the liver function. Patient is happy with that. Should not need clonazepam any longer but does have it. Hyponatremia is likely due to his Trileptal because patient is not symptomatic. We will continue to monitor. Blood pressure under good control. His monitor does not read his pressure accurately so he is going to attempt to return it. This note was partially created with the assistance of dictation. This may lead to grammatical or spelling errors. Benoit Rudolph M.D.

## 2021-11-30 ENCOUNTER — OFFICE VISIT (OUTPATIENT)
Dept: FAMILY MEDICINE CLINIC | Age: 46
End: 2021-11-30
Payer: MEDICAID

## 2021-11-30 VITALS
OXYGEN SATURATION: 97 % | SYSTOLIC BLOOD PRESSURE: 120 MMHG | WEIGHT: 137 LBS | DIASTOLIC BLOOD PRESSURE: 76 MMHG | BODY MASS INDEX: 20.29 KG/M2 | TEMPERATURE: 98.3 F | HEIGHT: 69 IN | HEART RATE: 71 BPM

## 2021-11-30 DIAGNOSIS — F10.11 HISTORY OF ALCOHOL ABUSE: ICD-10-CM

## 2021-11-30 DIAGNOSIS — L81.9 DISCOLORATION OF SKIN OF TOE: Primary | ICD-10-CM

## 2021-11-30 DIAGNOSIS — Z72.0 TOBACCO ABUSE: ICD-10-CM

## 2021-11-30 DIAGNOSIS — M79.672 PAIN OF LEFT HEEL: ICD-10-CM

## 2021-11-30 DIAGNOSIS — I73.00 RAYNAUD'S DISEASE WITHOUT GANGRENE: ICD-10-CM

## 2021-11-30 PROCEDURE — G8484 FLU IMMUNIZE NO ADMIN: HCPCS | Performed by: NURSE PRACTITIONER

## 2021-11-30 PROCEDURE — G8427 DOCREV CUR MEDS BY ELIG CLIN: HCPCS | Performed by: NURSE PRACTITIONER

## 2021-11-30 PROCEDURE — G8420 CALC BMI NORM PARAMETERS: HCPCS | Performed by: NURSE PRACTITIONER

## 2021-11-30 PROCEDURE — 99214 OFFICE O/P EST MOD 30 MIN: CPT | Performed by: NURSE PRACTITIONER

## 2021-11-30 PROCEDURE — 4004F PT TOBACCO SCREEN RCVD TLK: CPT | Performed by: NURSE PRACTITIONER

## 2021-11-30 RX ORDER — OXCARBAZEPINE 600 MG/1
TABLET, FILM COATED ORAL
COMMUNITY
Start: 2021-11-12

## 2021-11-30 ASSESSMENT — ENCOUNTER SYMPTOMS
CONSTIPATION: 0
ABDOMINAL PAIN: 0
COLOR CHANGE: 1
TROUBLE SWALLOWING: 0
SHORTNESS OF BREATH: 0
EYE PAIN: 0
COUGH: 0
DIARRHEA: 0
CHEST TIGHTNESS: 0

## 2021-11-30 NOTE — PROGRESS NOTES
Subjective  Chief Complaint   Patient presents with    Foot Pain     states that he has LT foot pain, mostly in the heel. states that he has raynaud's. HPI    Pt here to discuss concerns as above. Left heel pain-significant, cannot walk on it, having to walk on his toes. Has Raynauds and reports it is always worse in that foot. Reports a couple years ago did get a \"dead spot\" on his toe from his raynauds that was black/purple, lasted a couple months before it finally healed. No injury to the foot that he is aware of. Purple area the size of a pencil eraser on left heel, was the size of a pencil led when he first noticed it approx 1 week ago.     Past Medical History:   Diagnosis Date    Depression     Hypertension      Patient Active Problem List    Diagnosis Date Noted    Hyponatremia 08/10/2020    Abrasion of right arm 08/10/2020    History of cardiovascular disorder 08/07/2020    Raynaud's disease 08/07/2020    Pneumothorax, traumatic 08/07/2020    Acute pain due to trauma 08/07/2020    Closed traumatic nondisplaced fracture of multiple ribs of right side with routine healing 08/07/2020    Closed nondisplaced fracture of acromial process of right scapula with routine healing 08/07/2020    Bipolar I disorder ([facility] [address] 11/25/2019    Alcohol withdrawal syndrome without complication ([facility] [address] 79/48/0602     Past Surgical History:   Procedure Laterality Date    FRACTURE SURGERY Left     radius and ulna-- with mutliple skin grafts     Family History   Problem Relation Age of Onset    Bipolar Disorder Sister     Bipolar Disorder Brother     Asthma Neg Hx     Cancer Neg Hx     Heart Attack Neg Hx     Heart Disease Neg Hx     High Blood Pressure Neg Hx     High Cholesterol Neg Hx     Stroke Neg Hx      Social History     Socioeconomic History    Marital status: Single     Spouse name: None    Number of children: None    Years of education: None    Highest education level: None   Occupational History    None   Tobacco Use    Smoking status: Current Every Day Smoker     Packs/day: 0.50     Years: 35.00     Pack years: 17.50    Smokeless tobacco: Never Used   Vaping Use    Vaping Use: Never used   Substance and Sexual Activity    Alcohol use: Yes     Comment: 30-35 beers daily    Drug use: Yes     Types: Marijuana Delona Members)     Comment: occasional marijuana use    Sexual activity: Yes     Partners: Male     Comment: single male partner   Other Topics Concern    None   Social History Narrative    None     Social Determinants of Health     Financial Resource Strain: Low Risk     Difficulty of Paying Living Expenses: Not hard at all   Food Insecurity: No Food Insecurity    Worried About Running Out of Food in the Last Year: Never true    Arjinder of Food in the Last Year: Never true   Transportation Needs: No Transportation Needs    Lack of Transportation (Medical): No    Lack of Transportation (Non-Medical):  No   Physical Activity:     Days of Exercise per Week: Not on file    Minutes of Exercise per Session: Not on file   Stress:     Feeling of Stress : Not on file   Social Connections:     Frequency of Communication with Friends and Family: Not on file    Frequency of Social Gatherings with Friends and Family: Not on file    Attends Mormonism Services: Not on file    Active Member of [address] [place] [address] [employer] or Organizations: Not on file    Attends Club or Organization Meetings: Not on file    Marital Status: Not on file   Intimate Partner Violence:     Fear of Current or Ex-Partner: Not on file    Emotionally Abused: Not on file    Physically Abused: Not on file    Sexually Abused: Not on file   Housing Stability:     Unable to Pay for Housing in the Last Year: Not on file    Number of [place] in the Last Year: Not on file    Unstable Housing in the Last Year: Not on file     Current Outpatient Medications on File Prior to Visit   Medication Sig Dispense Refill    OXcarbazepine (TRILEPTAL) 600 MG tablet       lisinopril (PRINIVIL;ZESTRIL) 5 MG tablet Take 1 tablet by mouth once daily 30 tablet 2    busPIRone (BUSPAR) 10 MG tablet TAKE 1 TABLET BY MOUTH TWICE DAILY      EPINEPHrine (EPIPEN 2-BI) 0.3 MG/0.3ML SOAJ injection Use as directed for allergic reaction 1 each 1    OXcarbazepine (TRILEPTAL) 300 MG tablet Take by mouth      famotidine (PEPCID) 20 MG tablet Take 1 tablet by mouth 2 times daily for 7 days 14 tablet 0    albuterol sulfate HFA (VENTOLIN HFA) 108 (90 Base) MCG/ACT inhaler Inhale 2 puffs into the lungs 4 times daily as needed for Wheezing (Patient not taking: Reported on 11/30/2021) 1 Inhaler 1     No current facility-administered medications on file prior to visit. Allergies   Allergen Reactions    Benadryl [Diphenhydramine]        Review of Systems   Constitutional: Negative for activity change, appetite change and fatigue. HENT: Negative for ear pain, hearing loss and trouble swallowing. Eyes: Negative for pain and visual disturbance. Respiratory: Negative for cough, chest tightness and shortness of breath. Cardiovascular: Negative for chest pain, palpitations and leg swelling. Gastrointestinal: Negative for abdominal pain, constipation and diarrhea. Genitourinary: Negative for difficulty urinating. Musculoskeletal: Positive for arthralgias. Skin: Positive for color change and wound. Neurological: Negative for dizziness and light-headedness. Objective  Vitals:    11/30/21 1058   BP: 120/76   Site: Left Upper Arm   Position: Sitting   Cuff Size: Medium Adult   Pulse: 71   Temp: 98.3 °F (36.8 °C)   TempSrc: Infrared   SpO2: 97%   Weight: 137 lb (62.1 kg)   Height: 5' 9\" (1.753 m)     Physical Exam  Constitutional:       General: He is not in acute distress. Appearance: Normal appearance. He is obese. He is not ill-appearing, toxic-appearing or diaphoretic. HENT:      Head: Normocephalic and atraumatic.       Right Ear: External ear normal. Left Ear: External ear normal.      Nose: Nose normal. No congestion or rhinorrhea. Eyes:      Extraocular Movements: Extraocular movements intact. Conjunctiva/sclera: Conjunctivae normal.      Pupils: Pupils are equal, round, and reactive to light. Cardiovascular:      Rate and Rhythm: Normal rate and regular rhythm. Pulses: Normal pulses. Heart sounds: Normal heart sounds. No murmur heard. Pulmonary:      Effort: Pulmonary effort is normal. No respiratory distress. Breath sounds: Normal breath sounds. No stridor. No wheezing, rhonchi or rales. Chest:      Chest wall: No tenderness. Musculoskeletal:         General: Normal range of motion. Cervical back: Normal range of motion and neck supple. No tenderness. Right lower leg: No edema. Left lower leg: No edema. Lymphadenopathy:      Cervical: No cervical adenopathy. Skin:     General: Skin is warm. Capillary Refill: Capillary refill takes less than 2 seconds. Coloration: Skin is not jaundiced. Findings: No erythema or lesion. Comments: Purplish discoloration to left heel with central area of deeper purple discoloration and tenderness located at central ventral foot  Discoloration also noted to medial aspect of left great toe  Cold toes and foot on left foot   Neurological:      General: No focal deficit present. Mental Status: He is alert and oriented to person, place, and time. Mental status is at baseline. Cranial Nerves: No cranial nerve deficit. Coordination: Coordination normal.      Gait: Gait normal.   Psychiatric:         Mood and Affect: Mood normal.         Behavior: Behavior normal.         Thought Content: Thought content normal.         Judgment: Judgment normal.         Assessment& Plan     Diagnosis Orders   1.  Discoloration of skin of toe  CTA ABDOMINAL AORTA W BILAT RUNOFF [address], [place], NP, Interventional Radiology, [facility] Katelin Velasco MD,  Cardiology, [place]   2. Tobacco abuse  CTA ABDOMINAL AORTA W BILAT RUNOFF Richard Dhaliwal NP, Interventional Radiology, Calli Arrieta MD,  Cardiology, [place]   3. Raynaud's disease without gangrene  CTA ABDOMINAL AORTA W BILAT RUNOFF Richard Dhaliwal, NP, Interventional Radiology, Calli Arrieta MD,  Cardiology, [place]   4. Pain of left heel  CTA ABDOMINAL AORTA W BILAT RUNOFF W Richard Dhaliwal NP, Interventional Radiology, Calli Arrieta MD,  Cardiology, [place]   5. History of alcohol abuse  CTA ABDOMINAL AORTA W BILAT RUNOFF Richard Dhaliwal NP, Interventional Radiology, Calli Arrieta MD,  Cardiology, [place]     Discussed with Dr. Arnie Tobin, Urgent referral to vascular for further evaluation. CTA as ordered. Will f/u after testing. Side effects, adverse effects of the medication prescribed today, as well as treatment plan/ rationale and result expectations have been discussed with the patient who expresses understanding and desires to proceed. Close follow up to evaluate treatment results and for coordination of care. I have reviewed the patient's medical history in detail and updated the computerized patient record. As always, patient is advised that if symptoms worsen in any way they will proceed to the nearest emergency room.        Orders Placed This Encounter   Procedures    CTA ABDOMINAL AORTA W BILAT RUNOFF W WO CONTRAST     Standing Status:   Future     Standing Expiration Date:   11/30/2022     Order Specific Question:   Reason for exam:     Answer:   raynauds, left heel pain, skin discoloration of left foot/toe    Francine Bianchi NP, Interventional Radiology, [place]     Referral Priority:   Urgent     Referral Type:   Eval and Treat     Referral Reason:   Specialty Services Required     Referred to Provider:   ANNETTE Lagos CNP     Requested Specialty:   Nurse Practitioner     Number of Visits Requested:   Maira Hull MD,  Cardiology, [facility]     Referral Priority:   Urgent     Referral Type:   Eval and Treat     Referral Reason:   Specialty Services Required     Referred to Provider:   Miguel Peoples MD     Requested Specialty:   Interventional Cardiology     Number of Visits Requested:   1       No orders of the defined types were placed in this encounter. Medications Discontinued During This Encounter   Medication Reason    clonazePAM (KLONOPIN) 0.5 MG tablet Therapy completed    ibuprofen (ADVIL;MOTRIN) 600 MG tablet Therapy completed       Return if symptoms worsen or fail to improve.     ANNETTE Rowe CNP

## 2021-12-01 ENCOUNTER — OFFICE VISIT (OUTPATIENT)
Dept: CARDIOLOGY CLINIC | Age: 46
End: 2021-12-01
Payer: MEDICAID

## 2021-12-01 VITALS
BODY MASS INDEX: 20.53 KG/M2 | SYSTOLIC BLOOD PRESSURE: 124 MMHG | DIASTOLIC BLOOD PRESSURE: 78 MMHG | RESPIRATION RATE: 16 BRPM | HEIGHT: 69 IN | WEIGHT: 138.6 LBS | HEART RATE: 67 BPM

## 2021-12-01 DIAGNOSIS — I73.9 PAD (PERIPHERAL ARTERY DISEASE) (HCC): Primary | ICD-10-CM

## 2021-12-01 PROCEDURE — G8420 CALC BMI NORM PARAMETERS: HCPCS | Performed by: INTERNAL MEDICINE

## 2021-12-01 PROCEDURE — G8484 FLU IMMUNIZE NO ADMIN: HCPCS | Performed by: INTERNAL MEDICINE

## 2021-12-01 PROCEDURE — 93000 ELECTROCARDIOGRAM COMPLETE: CPT | Performed by: INTERNAL MEDICINE

## 2021-12-01 PROCEDURE — 99203 OFFICE O/P NEW LOW 30 MIN: CPT | Performed by: INTERNAL MEDICINE

## 2021-12-01 PROCEDURE — 4004F PT TOBACCO SCREEN RCVD TLK: CPT | Performed by: INTERNAL MEDICINE

## 2021-12-01 PROCEDURE — G8427 DOCREV CUR MEDS BY ELIG CLIN: HCPCS | Performed by: INTERNAL MEDICINE

## 2021-12-01 RX ORDER — DILTIAZEM HYDROCHLORIDE 120 MG/1
120 CAPSULE, COATED, EXTENDED RELEASE ORAL DAILY
Qty: 30 CAPSULE | Refills: 2 | Status: SHIPPED | OUTPATIENT
Start: 2021-12-01 | End: 2022-01-25 | Stop reason: ALTCHOICE

## 2021-12-01 ASSESSMENT — ENCOUNTER SYMPTOMS
EYE REDNESS: 0
CHEST TIGHTNESS: 0
COUGH: 0
DIARRHEA: 0
APNEA: 0
WHEEZING: 0
CONSTIPATION: 0
ABDOMINAL PAIN: 0
VOMITING: 0
SHORTNESS OF BREATH: 0
RHINORRHEA: 0
NAUSEA: 0
COLOR CHANGE: 1

## 2021-12-01 NOTE — PROGRESS NOTES
Chief Complaint   Patient presents with    Other     PAD       Patient presents for initial medical evaluation. Patient is followed on a regular basis by Dr. Susu Sykes MD.     25-year-old male with history of Raynaud's disease and active smoker comes to the clinic for the first time for evaluation and management of peripheral arterial disease    Patient reports that for many years he has been complaining of blue discoloration of fingers and toes especially during winter.     Currently his toe started getting bluish a month ago and his heel a week ago    Denies chest pain shortness of breath he is very physically active at home, he mows his grass and never has cardiac issues    Patient Active Problem List   Diagnosis    [facility] withdrawal syndrome without complication ([facility] [address]    Bipolar I disorder ([facility] [address]    History of cardiovascular disorder    Raynaud's disease    Pneumothorax, traumatic    Acute pain due to trauma    Closed traumatic nondisplaced fracture of multiple ribs of right side with routine healing    Closed nondisplaced fracture of acromial process of right scapula with routine healing    Hyponatremia    Abrasion of right arm       Past Surgical History:   Procedure Laterality Date    FRACTURE SURGERY Left     radius and ulna-- with mutliple skin grafts       Social History     Socioeconomic History    Marital status: Single     Spouse name: None    Number of children: None    Years of education: None    Highest education level: None   Occupational History    None   Tobacco Use    Smoking status: Current Every Day Smoker     Packs/day: 0.50     Years: 35.00     Pack years: 17.50    Smokeless tobacco: Never Used   Vaping Use    Vaping Use: Never used   Substance and Sexual Activity    Alcohol use: Yes     Comment: 30-35 beers daily    Drug use: Yes     Types: Marijuana Berneta Vick)     Comment: occasional marijuana use    Sexual activity: Yes     Partners: Male     Comment: single male partner   Other Topics Concern    None   Social History Narrative    None     Social Determinants of Health     Financial Resource Strain: Low Risk     Difficulty of Paying Living Expenses: Not hard at all   Food Insecurity: No Food Insecurity    Worried About Running Out of Food in the Last Year: Never true    Rajinder of Food in the Last Year: Never true   Transportation Needs: No Transportation Needs    Lack of Transportation (Medical): No    Lack of Transportation (Non-Medical):  No   Physical Activity:     Days of Exercise per Week: Not on file    Minutes of Exercise per Session: Not on file   Stress:     Feeling of Stress : Not on file   Social Connections:     Frequency of Communication with Friends and Family: Not on file    Frequency of Social Gatherings with Friends and Family: Not on file    Attends [employer] Services: Not on file    Active Member of Clubs or Organizations: Not on file    Attends Club or Organization Meetings: Not on file    Marital Status: Not on file   Intimate Partner Violence:     Fear of Current or Ex-Partner: Not on file    Emotionally Abused: Not on file    Physically Abused: Not on file    Sexually Abused: Not on file   Housing Stability:     Unable to Pay for Housing in the Last Year: Not on file    Number of Places Lived in the Last Year: Not on file    Unstable Housing in the Last Year: Not on file       Family History   Problem Relation Age of Onset    Bipolar Disorder Sister     Bipolar Disorder Brother     Asthma Neg Hx     Cancer Neg Hx     Heart Attack Neg Hx     Heart Disease Neg Hx     High Blood Pressure Neg Hx     High Cholesterol Neg Hx     Stroke Neg Hx        Current Outpatient Medications   Medication Sig Dispense Refill    OXcarbazepine (TRILEPTAL) 600 MG tablet       lisinopril (PRINIVIL;ZESTRIL) 5 MG tablet Take 1 tablet by mouth once daily 30 tablet 2    busPIRone (BUSPAR) 10 MG tablet TAKE 1 TABLET BY MOUTH TWICE DAILY      EPINEPHrine (EPIPEN 2-BI) 0.3 MG/0.3ML SOAJ injection Use as directed for allergic reaction 1 each 1    OXcarbazepine (TRILEPTAL) 300 MG tablet Take by mouth      famotidine (PEPCID) 20 MG tablet Take 1 tablet by mouth 2 times daily for 7 days 14 tablet 0     No current facility-administered medications for this visit. Benadryl [diphenhydramine]    Review of Systems:  Review of Systems   Constitutional: Negative for activity change, chills, diaphoresis and fever. HENT: Negative for congestion, ear pain, nosebleeds and rhinorrhea. Eyes: Negative for redness and visual disturbance. Respiratory: Negative for apnea, cough, chest tightness, shortness of breath and wheezing. Cardiovascular: Negative for chest pain, palpitations and leg swelling. Gastrointestinal: Negative for abdominal pain, constipation, diarrhea, nausea and vomiting. Genitourinary: Negative for difficulty urinating and dysuria. Musculoskeletal: Negative. Negative for joint swelling. Skin: Positive for color change. Negative for rash and wound. Neurological: Negative for dizziness, syncope, weakness, numbness and headaches. Psychiatric/Behavioral: Negative. VITALS:  Blood pressure 124/78, pulse 67, resp. rate 16, height 5' 9\" (1.753 m), weight 138 lb 9.6 oz (62.9 kg). Body mass index is 20.47 kg/m². Physical Examination:  Physical Exam  Vitals and nursing note reviewed. Constitutional:       Appearance: Normal appearance. HENT:      Head: Normocephalic and atraumatic. Mouth/Throat:      Mouth: Mucous membranes are moist.      Pharynx: Oropharynx is clear. Eyes:      Extraocular Movements: Extraocular movements intact. Conjunctiva/sclera: Conjunctivae normal.      Pupils: Pupils are equal, round, and reactive to light. Cardiovascular:      Rate and Rhythm: Normal rate and regular rhythm. Pulses: Normal pulses. Heart sounds: Normal heart sounds.    Pulmonary:      Effort: Pulmonary effort is normal.      Breath sounds: Normal breath sounds. Abdominal:      General: Abdomen is flat. Bowel sounds are normal.      Palpations: Abdomen is soft. Musculoskeletal:         General: Normal range of motion. Cervical back: Normal range of motion and neck supple. Skin:     General: Skin is warm. Neurological:      General: No focal deficit present. Mental Status: He is alert and oriented to person, place, and time. Mental status is at baseline. Psychiatric:         Mood and Affect: Mood normal.     His right big toe has fluid discoloration at the tip as well as right heel  2+ distal pulses bilaterally dorsalis pedis and posterior tibial      EKG: Normal sinus rhythm    Orders Placed This Encounter   Procedures    Ultrasound doppler arterial legs bilateral    EKG 12 lead         The ASCVD Risk score (Obey Rangel., et al., 2013) failed to calculate for the following reasons: The valid HDL cholesterol range is 20 to 100 mg/dL     ASSESSMENT:     Diagnosis Orders   1.  PAD (peripheral artery disease) ([facility])  EKG 12 lead    Ultrasound doppler arterial legs bilateral     Raynaud's disease  Active smoker    PLAN:   Recommend the patient to avoid cold as much as possible  Highly encourage patient to quit smoking  We will switch lisinopril to diltiazem 120 mg daily  We will obtain an ultrasound arterial of the bilateral legs to rule out PAD  Follow-up in 3 months

## 2021-12-07 ENCOUNTER — HOSPITAL ENCOUNTER (OUTPATIENT)
Dept: ULTRASOUND IMAGING | Age: 46
Discharge: HOME | End: 2021-12-09
Payer: MEDICAID

## 2021-12-07 DIAGNOSIS — I73.9 PERIPHERAL VASCULAR DISEASE, UNSPECIFIED (HCC): ICD-10-CM

## 2021-12-07 PROCEDURE — 93925 LOWER EXTREMITY STUDY: CPT

## 2021-12-15 ENCOUNTER — HOSPITAL ENCOUNTER (OUTPATIENT)
Dept: CT IMAGING | Age: 46
Discharge: HOME | End: 2021-12-17
Payer: MEDICAID

## 2021-12-15 DIAGNOSIS — M79.672 PAIN OF LEFT HEEL: ICD-10-CM

## 2021-12-15 DIAGNOSIS — F10.11 HISTORY OF ALCOHOL ABUSE: ICD-10-CM

## 2021-12-15 DIAGNOSIS — Z72.0 TOBACCO ABUSE: ICD-10-CM

## 2021-12-15 DIAGNOSIS — I73.00 RAYNAUD'S DISEASE WITHOUT GANGRENE: ICD-10-CM

## 2021-12-15 DIAGNOSIS — L81.9 DISCOLORATION OF SKIN OF TOE: ICD-10-CM

## 2021-12-15 PROCEDURE — 2580000003 HC RX 258: Performed by: NURSE PRACTITIONER

## 2021-12-15 PROCEDURE — 6360000004 HC RX CONTRAST MEDICATION: Performed by: NURSE PRACTITIONER

## 2021-12-15 PROCEDURE — 75635 CT ANGIO ABDOMINAL ARTERIES: CPT

## 2021-12-15 RX ORDER — SODIUM CHLORIDE 0.9 % (FLUSH) 0.9 %
10 SYRINGE (ML) INJECTION ONCE
Status: COMPLETED | OUTPATIENT
Start: 2021-12-15 | End: 2021-12-15

## 2021-12-15 RX ORDER — SODIUM CHLORIDE 9 MG/ML
INJECTION, SOLUTION INTRAVENOUS ONCE
Status: DISCONTINUED | OUTPATIENT
Start: 2021-12-15 | End: 2021-12-18 | Stop reason: HOSPADM

## 2021-12-15 RX ADMIN — IOPAMIDOL 150 ML: 755 INJECTION, SOLUTION INTRAVENOUS at 14:42

## 2021-12-15 RX ADMIN — SODIUM CHLORIDE, PRESERVATIVE FREE 10 ML: 5 INJECTION INTRAVENOUS at 14:43

## 2022-01-25 ENCOUNTER — OFFICE VISIT (OUTPATIENT)
Dept: FAMILY MEDICINE CLINIC | Age: 47
End: 2022-01-25
Payer: MEDICAID

## 2022-01-25 VITALS
WEIGHT: 131 LBS | OXYGEN SATURATION: 98 % | TEMPERATURE: 97.5 F | HEART RATE: 112 BPM | DIASTOLIC BLOOD PRESSURE: 84 MMHG | SYSTOLIC BLOOD PRESSURE: 136 MMHG | HEIGHT: 67 IN | BODY MASS INDEX: 20.56 KG/M2

## 2022-01-25 DIAGNOSIS — F10.930 ALCOHOL WITHDRAWAL SYNDROME WITHOUT COMPLICATION (HCC): ICD-10-CM

## 2022-01-25 DIAGNOSIS — F31.9 BIPOLAR I DISORDER (HCC): ICD-10-CM

## 2022-01-25 DIAGNOSIS — E87.1 HYPONATREMIA: Primary | ICD-10-CM

## 2022-01-25 DIAGNOSIS — I73.00 RAYNAUD'S DISEASE WITHOUT GANGRENE: ICD-10-CM

## 2022-01-25 DIAGNOSIS — I10 ESSENTIAL HYPERTENSION: ICD-10-CM

## 2022-01-25 PROCEDURE — 99214 OFFICE O/P EST MOD 30 MIN: CPT | Performed by: FAMILY MEDICINE

## 2022-01-25 RX ORDER — LISINOPRIL 5 MG/1
5 TABLET ORAL DAILY
COMMUNITY
Start: 2021-12-13 | End: 2022-07-25 | Stop reason: SINTOL

## 2022-01-25 RX ORDER — BUSPIRONE HYDROCHLORIDE 10 MG/1
10 TABLET ORAL 2 TIMES DAILY PRN
Qty: 60 TABLET | Refills: 3 | Status: SHIPPED | OUTPATIENT
Start: 2022-01-25

## 2022-01-25 ASSESSMENT — PATIENT HEALTH QUESTIONNAIRE - PHQ9
6. FEELING BAD ABOUT YOURSELF - OR THAT YOU ARE A FAILURE OR HAVE LET YOURSELF OR YOUR FAMILY DOWN: 0
7. TROUBLE CONCENTRATING ON THINGS, SUCH AS READING THE NEWSPAPER OR WATCHING TELEVISION: 0
4. FEELING TIRED OR HAVING LITTLE ENERGY: 0
SUM OF ALL RESPONSES TO PHQ QUESTIONS 1-9: 0
2. FEELING DOWN, DEPRESSED OR HOPELESS: 0
SUM OF ALL RESPONSES TO PHQ QUESTIONS 1-9: 0
5. POOR APPETITE OR OVEREATING: 0
SUM OF ALL RESPONSES TO PHQ QUESTIONS 1-9: 0
8. MOVING OR SPEAKING SO SLOWLY THAT OTHER PEOPLE COULD HAVE NOTICED. OR THE OPPOSITE, BEING SO FIGETY OR RESTLESS THAT YOU HAVE BEEN MOVING AROUND A LOT MORE THAN USUAL: 0
SUM OF ALL RESPONSES TO PHQ9 QUESTIONS 1 & 2: 0
1. LITTLE INTEREST OR PLEASURE IN DOING THINGS: 0
SUM OF ALL RESPONSES TO PHQ QUESTIONS 1-9: 0
SUM OF ALL RESPONSES TO PHQ QUESTIONS 1-9: 0
2. FEELING DOWN, DEPRESSED OR HOPELESS: 0
SUM OF ALL RESPONSES TO PHQ QUESTIONS 1-9: 0
10. IF YOU CHECKED OFF ANY PROBLEMS, HOW DIFFICULT HAVE THESE PROBLEMS MADE IT FOR YOU TO DO YOUR WORK, TAKE CARE OF THINGS AT HOME, OR GET ALONG WITH OTHER PEOPLE: 0
SUM OF ALL RESPONSES TO PHQ QUESTIONS 1-9: 0
3. TROUBLE FALLING OR STAYING ASLEEP: 0
9. THOUGHTS THAT YOU WOULD BE BETTER OFF DEAD, OR OF HURTING YOURSELF: 0
SUM OF ALL RESPONSES TO PHQ QUESTIONS 1-9: 0

## 2022-01-25 ASSESSMENT — ENCOUNTER SYMPTOMS
ABDOMINAL DISTENTION: 0
CHEST TIGHTNESS: 0
ABDOMINAL PAIN: 0
PHOTOPHOBIA: 0
SHORTNESS OF BREATH: 0

## 2022-01-25 NOTE — PROGRESS NOTES
Diagnosis Orders   1. Hyponatremia     2. Essential hypertension     3. Bipolar I disorder (HCC)  busPIRone (BUSPAR) 10 MG tablet   4. Alcohol withdrawal syndrome without complication ([facility] [address]     5. Raynaud's disease without gangrene       Return in about 4 months (around 6/1/2022) for for review of outcome of today's recommendation. Patient Instructions     Encourage patient to get blood work done. Encourage patient to get back to discontinuing alcohol and tobacco intake. Keep follow-up appointment with vascular in March  Patient Education        9 Ways to Cut Back on Drinking  Maybe you've found yourself drinking more alcohol than you'd prefer. If you want to cut back, here are some ideas to try. Think before you drink. Do you really want a drink, or is it just a habit? If you're used to having a drink at a certain time, try doing something else then. Look for substitutes. Find some no-alcohol drinks that you enjoy, like flavored seltzer water, tea with honey, or tonic with a slice of lime. Or try alcohol-free beer or \"virgin\" cocktails (without the alcohol). Drink more water. Use water to quench your thirst. Drink a glass of water before you have any alcohol. Have another glass along with every drink or between drinks. Shrink your drink. For example, have a bottle of beer instead of a pint. Use a smaller glass for wine. Choose drinks with lower alcohol content (ABV%). Or use less liquor and more mixer in cocktails. Slow down. It's easy to drink quickly and without thinking about it. Pay attention, and make each drink last longer. Do the math. Total up how much you spend on alcohol each month. How much is that a year? If you cut back, what could you do with the money you save? Take a break. Choose a day or two each week when you won't drink at all. Notice how you feel on those days, physically and emotionally. How did you sleep? Do you feel better?  Over time, add more break days.     Count calories. Would you like to lose some weight? That can be a good motivator for cutting back. Figure out how many calories are in each drink. How many does that add up to in a day? In a week? In a month? Practice saying no. Be ready when someone offers you a drink. Try: Minnie Nurse, I've had enough. \" Or \"Thanks, but I'm cutting back. \" Or \"No, thanks. I feel better when I drink less. \"   Current as of: February 11, 2021               Content Version: 13.1  © 2006-2021 [employer]. Care instructions adapted under license by [facility] ([facility]). If you have questions about a medical condition or this instruction, always ask your healthcare professional. [place] [address] any warranty or liability for your use of this information. Subjective:      Patient ID: Jane Bran is a 51 y.o. male who presents for:  Chief Complaint   Patient presents with    Hypertension     x 3 month follow up     Alcohol Problem     x 3 month follow up     Discuss Medications       Patient states he stopped drinking and smoking for about 2 months. Partially after discussing quitting drinking last time combined with his rainouts phenomena that caused a skin lesion and a vascular work-up. They changed his medication to lisinopril which is helping blood flow significantly and also encouraged him to stop smoking which he did. More recently he has been drinking and smoking again but nowhere near the volume he had previously. Denies lightheadedness. Was unable to get labs done this week because lab is closed. Mood has been stable.   Just bored with the winter      Current Outpatient Medications on File Prior to Visit   Medication Sig Dispense Refill    OXcarbazepine (TRILEPTAL) 600 MG tablet       EPINEPHrine (EPIPEN 2-BI) 0.3 MG/0.3ML SOAJ injection Use as directed for allergic reaction 1 each 1    OXcarbazepine (TRILEPTAL) 300 MG tablet Take by mouth      famotidine (PEPCID) 20 MG tablet Take 1 tablet by mouth 2 times daily for 7 days 14 tablet 0    lisinopril (PRINIVIL;ZESTRIL) 5 MG tablet Take 5 mg by mouth daily       No current facility-administered medications on file prior to visit. Past Medical History:   Diagnosis Date    Depression     Hypertension      Past Surgical History:   Procedure Laterality Date    FRACTURE SURGERY Left     radius and ulna-- with mutliple skin grafts     Social History     Socioeconomic History    Marital status: Single     Spouse name: Not on file    Number of children: Not on file    Years of education: Not on file    Highest education level: Not on file   Occupational History    Not on file   Tobacco Use    Smoking status: Current Every Day Smoker     Packs/day: 0.50     Years: 35.00     Pack years: 17.50    Smokeless tobacco: Never Used   Vaping Use    Vaping Use: Never used   Substance and Sexual Activity    Alcohol use: Yes     Comment: 30-35 beers daily    Drug use: Yes     Types: Marijuana (Weed)     Comment: occasional marijuana use    Sexual activity: Yes     Partners: Male     Comment: single male partner   Other Topics Concern    Not on file   Social History Narrative    Not on file     Social Determinants of Health     Financial Resource Strain: Low Risk     Difficulty of Paying Living Expenses: Not hard at all   Food Insecurity: No Food Insecurity    Worried About Running Out of Food in the Last Year: Never true    Rajinder of Food in the Last Year: Never true   Transportation Needs: No Transportation Needs    Lack of Transportation (Medical): No    Lack of Transportation (Non-Medical):  No   Physical Activity:     Days of Exercise per Week: Not on file    Minutes of Exercise per Session: Not on file   Stress:     Feeling of Stress : Not on file   Social Connections:     Frequency of Communication with Friends and Family: Not on file    Frequency of Social Gatherings with Friends and Family: Not on file   Speedy Andrews Attends Jainism Services: Not on file    Active Member of Clubs or Organizations: Not on file    Attends Club or Organization Meetings: Not on file    Marital Status: Not on file   Intimate Partner Violence:     Fear of Current or Ex-Partner: Not on file    Emotionally Abused: Not on file    Physically Abused: Not on file    Sexually Abused: Not on file   Housing Stability:     Unable to Pay for Housing in the Last Year: Not on file    Number of [place] in the Last Year: Not on file    Unstable Housing in the Last Year: Not on file     Family History   Problem Relation Age of Onset    Bipolar Disorder Sister     Bipolar Disorder Brother     Asthma Neg Hx     Cancer Neg Hx     Heart Attack Neg Hx     Heart Disease Neg Hx     High Blood Pressure Neg Hx     High Cholesterol Neg Hx     Stroke Neg Hx      Allergies:  Benadryl [diphenhydramine]    Review of Systems   Constitutional: Negative for activity change, appetite change, diaphoresis and unexpected weight change. Eyes: Negative for photophobia and visual disturbance. Respiratory: Negative for chest tightness and shortness of breath. No orthopnea   Cardiovascular: Negative for chest pain, palpitations and leg swelling. Gastrointestinal: Negative for abdominal distention and abdominal pain. Genitourinary: Negative for flank pain and frequency. Musculoskeletal: Negative for gait problem and joint swelling. Neurological: Negative for dizziness, weakness, light-headedness and headaches. Psychiatric/Behavioral: Negative for confusion. Objective:   /84   Pulse 112   Temp 97.5 °F (36.4 °C)   Ht 5' 7\" (1.702 m) Comment: accurate  Wt 131 lb (59.4 kg)   SpO2 98%   BMI 20.52 kg/m²     Physical Exam  Vitals reviewed. Constitutional:       General: He is not in acute distress. Appearance: He is well-developed. HENT:      Head: Normocephalic and atraumatic.       Right Ear: External ear normal.      Left Ear: External ear normal.      Nose: Nose normal.   Eyes:      General:         Right eye: No discharge. Left eye: No discharge. Conjunctiva/sclera: Conjunctivae normal.      Pupils: Pupils are equal, round, and reactive to light. Neck:      Thyroid: No thyromegaly. Cardiovascular:      Rate and Rhythm: Normal rate and regular rhythm. Heart sounds: Normal heart sounds. Pulmonary:      Effort: Pulmonary effort is normal. No respiratory distress. Breath sounds: Normal breath sounds. Abdominal:      General: There is no distension. Musculoskeletal:      Cervical back: Neck supple. Skin:     General: Skin is warm and dry. Neurological:      Mental Status: He is alert and oriented to person, place, and time. Coordination: Coordination normal.   Psychiatric:         Thought Content: Thought content normal.         Judgment: Judgment normal.         No results found for this visit on 01/25/22. No results found for this or any previous visit (from the past 2016 hour(s)). [] Pt was seen by provider for      Minutes  Counseling and coordination of care was done for all assessment diagnosis listed for today with patient and any family/friend present. More than 50% of this visit was spent coordinating cuurent care, obtaining information for prior records, and counseling for current plan of action. Assessment:       Diagnosis Orders   1. Hyponatremia     2. Essential hypertension     3. Bipolar I disorder (HCC)  busPIRone (BUSPAR) 10 MG tablet   4. Alcohol withdrawal syndrome without complication ([facility] [address]     5. Raynaud's disease without gangrene           No orders of the defined types were placed in this encounter.       Orders Placed This Encounter   Medications    busPIRone (BUSPAR) 10 MG tablet     Sig: Take 1 tablet by mouth 2 times daily as needed (anxiety)     Dispense:  60 tablet     Refill:  3          Medication List          Accurate as of January 25, 2022 11:49 AM. If you have any questions, ask your nurse or doctor. CHANGE how you take these medications    busPIRone 10 MG tablet  Commonly known as: BUSPAR  Take 1 tablet by mouth 2 times daily as needed (anxiety)  What changed: See the new instructions. Changed by: Geremias Franco MD        CONTINUE taking these medications    EPINEPHrine 0.3 MG/0.3ML Soaj injection  Commonly known as: EpiPen 2-Corey  Use as directed for allergic reaction     famotidine 20 MG tablet  Commonly known as: PEPCID  Take 1 tablet by mouth 2 times daily for 7 days     lisinopril 5 MG tablet  Commonly known as: PRINIVIL;ZESTRIL     * Trileptal 300 MG tablet  Generic drug: OXcarbazepine     * OXcarbazepine 600 MG tablet  Commonly known as: TRILEPTAL         * This list has 2 medication(s) that are the same as other medications prescribed for you. Read the directions carefully, and ask your doctor or other care provider to review them with you. STOP taking these medications    dilTIAZem 120 MG extended release capsule  Commonly known as: CARDIZEM CD  Stopped by: Geremias Franco MD           Where to Get Your Medications      These medications were sent to [address] [facility], [address] [facility] [address], [address], [address] [facility]  [address]    Phone: [phone]   · busPIRone 10 MG tablet           Plan:   Return in about 4 months (around 6/1/2022) for for review of outcome of today's recommendation. Patient Instructions     Encourage patient to get blood work done. Encourage patient to get back to discontinuing alcohol and tobacco intake. Keep follow-up appointment with vascular in March  Patient Education        9 Ways to Cut Back on Drinking  Maybe you've found yourself drinking more alcohol than you'd prefer. If you want to cut back, here are some ideas to try. Think before you drink. Do you really want a drink, or is it just a habit?  If you're used to having a drink at a certain time, try doing something else then. Look for substitutes. Find some no-alcohol drinks that you enjoy, like flavored seltzer water, tea with honey, or tonic with a slice of lime. Or try alcohol-free beer or \"virgin\" cocktails (without the alcohol). Drink more water. Use water to quench your thirst. Drink a glass of water before you have any alcohol. Have another glass along with every drink or between drinks. Shrink your drink. For example, have a bottle of beer instead of a pint. Use a smaller glass for wine. Choose drinks with lower alcohol content (ABV%). Or use less liquor and more mixer in cocktails. Slow down. It's easy to drink quickly and without thinking about it. Pay attention, and make each drink last longer. Do the math. Total up how much you spend on alcohol each month. How much is that a year? If you cut back, what could you do with the money you save? Take a break. Choose a day or two each week when you won't drink at all. Notice how you feel on those days, physically and emotionally. How did you sleep? Do you feel better? Over time, add more break days. Count calories. Would you like to lose some weight? That can be a good motivator for cutting back. Figure out how many calories are in each drink. How many does that add up to in a day? In a week? In a month? Practice saying no. Be ready when someone offers you a drink. Try: Xavier Funes, I've had enough. \" Or \"Thanks, but I'm cutting back. \" Or \"No, thanks. I feel better when I drink less. \"   Current as of: February 11, 2021               Content Version: 13.1  © 6362-3661 [employer], [employer] Care instructions adapted under license by [facility] ([facility]). If you have questions about a medical condition or this instruction, always ask your healthcare professional. [place] [address] any warranty or liability for your use of this information.              This note was partially created with the assistance of dictation. This may lead to grammatical or spelling errors. Benoit Andrew M.D.

## 2022-01-25 NOTE — PATIENT INSTRUCTIONS
Encourage patient to get blood work done. Encourage patient to get back to discontinuing alcohol and tobacco intake. Keep follow-up appointment with vascular in March  Patient Education        9 Ways to Cut Back on Drinking  Maybe you've found yourself drinking more alcohol than you'd prefer. If you want to cut back, here are some ideas to try. Think before you drink. Do you really want a drink, or is it just a habit? If you're used to having a drink at a certain time, try doing something else then. Look for substitutes. Find some no-alcohol drinks that you enjoy, like flavored seltzer water, tea with honey, or tonic with a slice of lime. Or try alcohol-free beer or \"virgin\" cocktails (without the alcohol). Drink more water. Use water to quench your thirst. Drink a glass of water before you have any alcohol. Have another glass along with every drink or between drinks. Shrink your drink. For example, have a bottle of beer instead of a pint. Use a smaller glass for wine. Choose drinks with lower alcohol content (ABV%). Or use less liquor and more mixer in cocktails. Slow down. It's easy to drink quickly and without thinking about it. Pay attention, and make each drink last longer. Do the math. Total up how much you spend on alcohol each month. How much is that a year? If you cut back, what could you do with the money you save? Take a break. Choose a day or two each week when you won't drink at all. Notice how you feel on those days, physically and emotionally. How did you sleep? Do you feel better? Over time, add more break days. Count calories. Would you like to lose some weight? That can be a good motivator for cutting back. Figure out how many calories are in each drink. How many does that add up to in a day? In a week? In a month? Practice saying no. Be ready when someone offers you a drink. Try: Fletcher Mealy, I've had enough. \" Or \"Thanks, but I'm cutting back. \" Or \"No, thanks. I feel better when I drink less. \"   Current as of: February 11, 2021               Content Version: 13.1  © 1264-6517 [employer]. Care instructions adapted under license by [facility] ([facility]). If you have questions about a medical condition or this instruction, always ask your healthcare professional. [place] [address] any warranty or liability for your use of this information.

## 2022-01-25 NOTE — PROGRESS NOTES
1. Hyponatremia  Comprehensive Metabolic Panel   2. Essential hypertension      3. Alcohol abuse  Gamma Gt     Comprehensive Metabolic Panel   4. Abnormal liver function test  Gamma Gt     Comprehensive Metabolic Panel      Return in about 3 months (around 1/21/2022) for for review of outcome of today's recommendation. Patient Instructions   Pt will continue with alcohol avoidance. He is down to 3 cigarettes a day. He will continue to work on that. Its significant improvement. Discussed the improved results of the liver function. Patient is happy with that. Should not need clonazepam any longer but does have it. Hyponatremia is likely due to his Trileptal because patient is not symptomatic. We will continue to monitor. Blood pressure under good control. His monitor does not read his pressure accurately so he is going to attempt to return it.

## 2022-02-02 DIAGNOSIS — F10.10 ALCOHOL ABUSE: ICD-10-CM

## 2022-02-02 DIAGNOSIS — E87.1 HYPONATREMIA: ICD-10-CM

## 2022-02-02 DIAGNOSIS — R79.89 ABNORMAL LIVER FUNCTION TEST: ICD-10-CM

## 2022-02-02 LAB
ALBUMIN SERPL-MCNC: 4.3 G/DL (ref 3.5–4.6)
ALP BLD-CCNC: 93 U/L (ref 35–104)
ALT SERPL-CCNC: 50 U/L (ref 0–41)
ANION GAP SERPL CALCULATED.3IONS-SCNC: 14 MEQ/L (ref 9–15)
AST SERPL-CCNC: 73 U/L (ref 0–40)
BILIRUB SERPL-MCNC: 0.3 MG/DL (ref 0.2–0.7)
BUN BLDV-MCNC: 3 MG/DL (ref 6–20)
CALCIUM SERPL-MCNC: 8.9 MG/DL (ref 8.5–9.9)
CHLORIDE BLD-SCNC: 90 MEQ/L (ref 95–107)
CO2: 24 MEQ/L (ref 20–31)
CREAT SERPL-MCNC: 0.47 MG/DL (ref 0.7–1.2)
GAMMA GLUTAMYL TRANSFERASE: 103 U/L (ref 0–60)
GFR AFRICAN AMERICAN: >60
GFR NON-AFRICAN AMERICAN: >60
GLOBULIN: 3.2 G/DL (ref 2.3–3.5)
GLUCOSE BLD-MCNC: 84 MG/DL (ref 70–99)
POTASSIUM SERPL-SCNC: 4 MEQ/L (ref 3.4–4.9)
SODIUM BLD-SCNC: 128 MEQ/L (ref 135–144)
TOTAL PROTEIN: 7.5 G/DL (ref 6.3–8)

## 2022-02-02 NOTE — TELEPHONE ENCOUNTER
Future Appointments    Encounter Information    Provider Department Appt Notes   3/2/2022 Erlinda Burrows MD [facility] 3 months       Recent Visits    01/25/2022 Hyponatremia   [facility] Spike Dangelo MD

## 2022-02-07 ENCOUNTER — PATIENT MESSAGE (OUTPATIENT)
Dept: FAMILY MEDICINE CLINIC | Age: 47
End: 2022-02-07

## 2022-02-07 RX ORDER — CLONAZEPAM 0.5 MG/1
TABLET ORAL
Qty: 15 TABLET | Refills: 0 | Status: SHIPPED | OUTPATIENT
Start: 2022-02-07 | End: 2022-02-09

## 2022-02-07 NOTE — TELEPHONE ENCOUNTER
From: Gio Pulido  To: Dr. Felicia Zhao: 2/7/2022 2:16 PM EST  Subject: Test results and yes    I saw my test results and yes I would like this medication again.

## 2022-02-08 DIAGNOSIS — F10.10 ALCOHOL ABUSE: ICD-10-CM

## 2022-02-08 NOTE — TELEPHONE ENCOUNTER
Requesting medication refill. Please approve or deny this request.    Rx requested:  Requested Prescriptions     Pending Prescriptions Disp Refills    clonazePAM (KLONOPIN) 0.5 MG tablet [Pharmacy Med Name: clonazePAM Oral Tablet 0.5 MG] 15 tablet 0     Sig: TAKE 1 TABLET BY MOUTH 3 TIMES A DAY AS NEEDED (BASED ON WITHDRAWAL SYMPTOMS) FOR UP TO 10 DAYS       Last Office Visit:   1/25/2022    Next Visit Date:  Future Appointments   Date Time Provider Bridget Parsons   3/2/2022  8:30 AM Manjit Cleveland MD [facility]     Message sent to schedule appt.

## 2022-02-09 RX ORDER — CLONAZEPAM 0.5 MG/1
TABLET ORAL
Qty: 15 TABLET | Refills: 0 | Status: SHIPPED | OUTPATIENT
Start: 2022-02-09 | End: 2022-07-25 | Stop reason: ALTCHOICE

## 2022-02-09 NOTE — TELEPHONE ENCOUNTER
Received call for pt stating that [facility] does not have script. I am seeing that #15 was sent over on the 7th. Called [facility] and they state that they do not have it?

## 2022-03-02 ENCOUNTER — OFFICE VISIT (OUTPATIENT)
Dept: CARDIOLOGY CLINIC | Age: 47
End: 2022-03-02
Payer: MEDICAID

## 2022-03-02 VITALS
SYSTOLIC BLOOD PRESSURE: 122 MMHG | OXYGEN SATURATION: 96 % | BODY MASS INDEX: 21.77 KG/M2 | HEART RATE: 77 BPM | DIASTOLIC BLOOD PRESSURE: 74 MMHG | WEIGHT: 139 LBS

## 2022-03-02 DIAGNOSIS — I73.00 RAYNAUD'S DISEASE WITHOUT GANGRENE: Primary | ICD-10-CM

## 2022-03-02 PROCEDURE — 99213 OFFICE O/P EST LOW 20 MIN: CPT | Performed by: INTERNAL MEDICINE

## 2022-03-02 ASSESSMENT — ENCOUNTER SYMPTOMS
WHEEZING: 0
NAUSEA: 0
ABDOMINAL PAIN: 0
SHORTNESS OF BREATH: 0
DIARRHEA: 0
VOMITING: 0
RHINORRHEA: 0
APNEA: 0
CHEST TIGHTNESS: 0
COUGH: 0
CONSTIPATION: 0
COLOR CHANGE: 1
EYE REDNESS: 0

## 2022-03-02 NOTE — PROGRESS NOTES
Chief Complaint   Patient presents with    3 Month Follow-Up     P.A.D. Patient presents for initial medical evaluation. Patient is followed on a regular basis by Dr. Sarah Person MD.     No prior cardiac history  Raynaud's disease  No prior echocardiograms, no prior nuclear stress test, no prior coronary angiograms    3/2/2022  Patient returns to clinic as follow-up for lower extremity new discoloration  Patient underwent arterial ultrasound of the lower extremities bilaterally on 12/1/2021 which was reported as no significant stenosis  Patient was switched from lisinopril to diltiazem. Reports that pain in LE initially started getting worse but then after a week it actually improved. Ulcers healed and there is no pain. States that he is still smoking, he is drinking coffee  States that he is ready to quit if he he is at risk of lower extremity ulcers     151/21  65-year-old male with history of Raynaud's disease and active smoker comes to the clinic for the first time for evaluation and management of peripheral arterial disease    Patient reports that for many years he has been complaining of blue discoloration of fingers and toes especially during winter.     Currently his toe started getting bluish a month ago and his heel a week ago    Denies chest pain shortness of breath he is very physically active at home, he mows his grass and never has cardiac issues    Patient Active Problem List   Diagnosis    Alcohol withdrawal syndrome without complication ([address]    Bipolar I disorder ([facility]    History of cardiovascular disorder    Raynaud's disease    Pneumothorax, traumatic    Acute pain due to trauma    Closed traumatic nondisplaced fracture of multiple ribs of right side with routine healing    Closed nondisplaced fracture of acromial process of right scapula with routine healing    Hyponatremia    Abrasion of right arm       Past Surgical History:   Procedure Laterality Date    FRACTURE SURGERY Left     radius and ulna-- with mutliple skin grafts       Social History     Socioeconomic History    Marital status: Single     Spouse name: None    Number of children: None    Years of education: None    Highest education level: None   Occupational History    None   Tobacco Use    Smoking status: Current Every Day Smoker     Packs/day: 0.50     Years: 35.00     Pack years: 17.50    Smokeless tobacco: Never Used   Vaping Use    Vaping Use: Never used   Substance and Sexual Activity    Alcohol use: Yes     Comment: 30-35 beers daily    Drug use: Yes     Types: Marijuana Molina Tellez     Comment: occasional marijuana use    Sexual activity: Yes     Partners: Male     Comment: single male partner   Other Topics Concern    None   Social History Narrative    None     Social Determinants of Health     Financial Resource Strain: Low Risk     Difficulty of Paying Living Expenses: Not hard at all   Food Insecurity: No Food Insecurity    Worried About Running Out of Food in the Last Year: Never true    Rajinder of Food in the Last Year: Never true   Transportation Needs: No Transportation Needs    Lack of Transportation (Medical): No    Lack of Transportation (Non-Medical):  No   Physical Activity:     Days of Exercise per Week: Not on file    Minutes of Exercise per Session: Not on file   Stress:     Feeling of Stress : Not on file   Social Connections:     Frequency of Communication with Friends and Family: Not on file    Frequency of Social Gatherings with Friends and Family: Not on file    Attends Rast[employer]arian Services: Not on file    Active Member of Clubs or Organizations: Not on file    Attends Club or Organization Meetings: Not on file    Marital Status: Not on file   Intimate Partner Violence:     Fear of Current or Ex-Partner: Not on file    Emotionally Abused: Not on file    Physically Abused: Not on file    Sexually Abused: Not on file   Housing Stability:     Unable to Pay for Housing in the Last Year: Not on file    Number of Places Lived in the Last Year: Not on file    Unstable Housing in the Last Year: Not on file       Family History   Problem Relation Age of Onset    Bipolar Disorder Sister     Bipolar Disorder Brother     Asthma Neg Hx     Cancer Neg Hx     Heart Attack Neg Hx     Heart Disease Neg Hx     High Blood Pressure Neg Hx     High Cholesterol Neg Hx     Stroke Neg Hx        Current Outpatient Medications   Medication Sig Dispense Refill    lisinopril (PRINIVIL;ZESTRIL) 5 MG tablet Take 5 mg by mouth daily      busPIRone (BUSPAR) 10 MG tablet Take 1 tablet by mouth 2 times daily as needed (anxiety) 60 tablet 3    OXcarbazepine (TRILEPTAL) 600 MG tablet       EPINEPHrine (EPIPEN 2-BI) 0.3 MG/0.3ML SOAJ injection Use as directed for allergic reaction 1 each 1    OXcarbazepine (TRILEPTAL) 300 MG tablet Take by mouth      clonazePAM (KLONOPIN) 0.5 MG tablet TAKE 1 TABLET BY MOUTH 3 TIMES A DAY AS NEEDED (BASED ON WITHDRAWAL SYMPTOMS) FOR UP TO 10 DAYS 15 tablet 0    famotidine (PEPCID) 20 MG tablet Take 1 tablet by mouth 2 times daily for 7 days 14 tablet 0     No current facility-administered medications for this visit. Benadryl [diphenhydramine]    Review of Systems:  Review of Systems   Constitutional: Negative for activity change, chills, diaphoresis and fever. HENT: Negative for congestion, ear pain, nosebleeds and rhinorrhea. Eyes: Negative for redness and visual disturbance. Respiratory: Negative for apnea, cough, chest tightness, shortness of breath and wheezing. Cardiovascular: Negative for chest pain, palpitations and leg swelling. Gastrointestinal: Negative for abdominal pain, constipation, diarrhea, nausea and vomiting. Genitourinary: Negative for difficulty urinating and dysuria. Musculoskeletal: Negative. Negative for joint swelling. Skin: Positive for color change. Negative for rash and wound.    Neurological: Negative for dizziness, syncope, weakness, numbness and headaches. Psychiatric/Behavioral: Negative. VITALS:  Blood pressure 122/74, pulse 77, weight 139 lb (63 kg), SpO2 96 %. Body mass index is 21.77 kg/m². Physical Examination:  Physical Exam  Vitals and nursing note reviewed. Constitutional:       Appearance: Normal appearance. HENT:      Head: Normocephalic and atraumatic. Mouth/Throat:      Mouth: Mucous membranes are moist.      Pharynx: Oropharynx is clear. Eyes:      Extraocular Movements: Extraocular movements intact. Conjunctiva/sclera: Conjunctivae normal.      Pupils: Pupils are equal, round, and reactive to light. Cardiovascular:      Rate and Rhythm: Normal rate and regular rhythm. Pulses: Normal pulses. Heart sounds: Normal heart sounds. Pulmonary:      Effort: Pulmonary effort is normal.      Breath sounds: Normal breath sounds. Abdominal:      General: Abdomen is flat. Bowel sounds are normal.      Palpations: Abdomen is soft. Musculoskeletal:         General: Normal range of motion. Cervical back: Normal range of motion and neck supple. Skin:     General: Skin is warm. Neurological:      General: No focal deficit present. Mental Status: He is alert and oriented to person, place, and time. Mental status is at baseline. Psychiatric:         Mood and Affect: Mood normal.     His right big toe has fluid discoloration at the tip as well as right heel  2+ distal pulses bilaterally dorsalis pedis and posterior tibial      EKG: Normal sinus rhythm    No orders of the defined types were placed in this encounter. The ASCVD Risk score (Saima Acuña, et al., 2013) failed to calculate for the following reasons: The valid HDL cholesterol range is 20 to 100 mg/dL     ASSESSMENT:     Diagnosis Orders   1.  Raynaud's disease without gangrene       Raynaud's disease  Active smoker    PLAN:   Recommend the patient to avoid cold as much as possible  Highly encourage patient to quit smoking, patient states that he is ready to quit  Cont diltiazem 120 mg daily  Obtain an echocardiogram, to rule out systolic dysfunction  Follow-up in 6 months    Recommended patient to eat diets that emphasize high intakes of vegetables, fruits, nuts, whole grains, lean vegetable or animal protein, and fish.  As per 2019 ACC/AHA guideline on primary prevention of CVD     Recommended patient to engage in at least 150 minutes per week of accumulated moderate-intensity physical activity or 75 minutes per week of vigorous-intensity physical activity as per 2019 ACC/AHA guideline on primary prevention of CVD

## 2022-03-04 RX ORDER — DILTIAZEM HYDROCHLORIDE 120 MG/1
CAPSULE, COATED, EXTENDED RELEASE ORAL
Qty: 30 CAPSULE | Refills: 0 | OUTPATIENT
Start: 2022-03-04

## 2022-03-04 NOTE — TELEPHONE ENCOUNTER
Please approve or deny this refill request. The order is pended. Thank you.     LOV 3/2/2022    Next Visit Date:  Future Appointments   Date Time Provider Bridget Parsons   3/15/2022  9:30 AM MLOZ ECHO  S. Sarahi   9/7/2022  9:00 AM Dc Bland MD [facility]

## 2022-03-07 RX ORDER — DILTIAZEM HYDROCHLORIDE 120 MG/1
120 CAPSULE, COATED, EXTENDED RELEASE ORAL DAILY
Qty: 90 CAPSULE | Refills: 2 | Status: SHIPPED | OUTPATIENT
Start: 2022-03-07

## 2022-03-07 NOTE — TELEPHONE ENCOUNTER
Please approve or deny this refill request. The order is pended. Thank you.     LOV 3/2/2022    Next Visit Date:  Future Appointments   Date Time Provider Bridget Parsons   3/15/2022  9:30 AM MLOZ ECHO  S. Sarahi   9/7/2022  9:00 AM Francesco Hightower MD [facility]         VERBAL ORDER RECEIVED WITH READ BACK

## 2022-03-15 ENCOUNTER — HOSPITAL ENCOUNTER (OUTPATIENT)
Dept: NON INVASIVE DIAGNOSTICS | Age: 47
Discharge: HOME | End: 2022-03-15
Payer: MEDICAID

## 2022-03-15 DIAGNOSIS — I73.00 RAYNAUD'S DISEASE WITHOUT GANGRENE: ICD-10-CM

## 2022-03-15 LAB
LV EF: 65 %
LVEF MODALITY: NORMAL

## 2022-03-15 PROCEDURE — 93306 TTE W/DOPPLER COMPLETE: CPT

## 2022-03-18 ENCOUNTER — OFFICE VISIT (OUTPATIENT)
Dept: FAMILY MEDICINE CLINIC | Age: 47
End: 2022-03-18
Payer: MEDICAID

## 2022-03-18 VITALS
OXYGEN SATURATION: 98 % | HEART RATE: 82 BPM | BODY MASS INDEX: 21.82 KG/M2 | SYSTOLIC BLOOD PRESSURE: 124 MMHG | DIASTOLIC BLOOD PRESSURE: 74 MMHG | TEMPERATURE: 97.1 F | WEIGHT: 139 LBS | HEIGHT: 67 IN

## 2022-03-18 DIAGNOSIS — R22.30 LOCALIZED SWELLING OF FOREARM: Primary | ICD-10-CM

## 2022-03-18 PROCEDURE — 99214 OFFICE O/P EST MOD 30 MIN: CPT | Performed by: FAMILY MEDICINE

## 2022-03-18 NOTE — PROGRESS NOTES
tablet by mouth 2 times daily for 7 days 14 tablet 0     No current facility-administered medications on file prior to visit. Past Medical History:   Diagnosis Date    Depression     Hypertension      Past Surgical History:   Procedure Laterality Date    FRACTURE SURGERY Left     radius and ulna-- with mutliple skin grafts     Social History     Socioeconomic History    Marital status: Single     Spouse name: Not on file    Number of children: Not on file    Years of education: Not on file    Highest education level: Not on file   Occupational History    Not on file   Tobacco Use    Smoking status: Current Every Day Smoker     Packs/day: 0.50     Years: 35.00     Pack years: 17.50    Smokeless tobacco: Never Used   Vaping Use    Vaping Use: Never used   Substance and Sexual Activity    Alcohol use: Yes     Comment: 30-35 beers daily    Drug use: Yes     Types: Marijuana (Weed)     Comment: occasional marijuana use    Sexual activity: Yes     Partners: Male     Comment: single male partner   Other Topics Concern    Not on file   Social History Narrative    Not on file     Social Determinants of Health     Financial Resource Strain: Low Risk     Difficulty of Paying Living Expenses: Not hard at all   Food Insecurity: No Food Insecurity    Worried About Running Out of Food in the Last Year: Never true    Rajinder of Food in the Last Year: Never true   Transportation Needs: No Transportation Needs    Lack of Transportation (Medical): No    Lack of Transportation (Non-Medical):  No   Physical Activity:     Days of Exercise per Week: Not on file    Minutes of Exercise per Session: Not on file   Stress:     Feeling of Stress : Not on file   Social Connections:     Frequency of Communication with Friends and Family: Not on file    Frequency of Social Gatherings with Friends and Family: Not on file    Attends [employer] Services: Not on file    Active Member of Clubs or Organizations: Not on file  Attends Club or Organization Meetings: Not on file    Marital Status: Not on file   Intimate Partner Violence:     Fear of Current or Ex-Partner: Not on file    Emotionally Abused: Not on file    Physically Abused: Not on file    Sexually Abused: Not on file   Housing Stability:     Unable to Pay for Housing in the Last Year: Not on file    Number of [place] in the Last Year: Not on file    Unstable Housing in the Last Year: Not on file     Family History   Problem Relation Age of Onset    Bipolar Disorder Sister     Bipolar Disorder Brother     Asthma Neg Hx     Cancer Neg Hx     Heart Attack Neg Hx     Heart Disease Neg Hx     High Blood Pressure Neg Hx     High Cholesterol Neg Hx     Stroke Neg Hx      Allergies:  Benadryl [diphenhydramine]    Review of Systems   Musculoskeletal: Positive for joint swelling. Neurological: Negative for weakness and numbness. Objective:   /74   Pulse 82   Temp 97.1 °F (36.2 °C)   Ht 5' 7\" (1.702 m)   Wt 139 lb (63 kg)   SpO2 98%   BMI 21.77 kg/m²     Physical Exam  Vitals reviewed. Constitutional:       General: He is not in acute distress. Appearance: He is well-developed. HENT:      Head: Normocephalic and atraumatic. Right Ear: External ear normal.      Left Ear: External ear normal.      Nose: Nose normal.   Eyes:      General:         Right eye: No discharge. Left eye: No discharge. Conjunctiva/sclera: Conjunctivae normal.      Pupils: Pupils are equal, round, and reactive to light. Neck:      Thyroid: No thyromegaly. Cardiovascular:      Rate and Rhythm: Normal rate and regular rhythm. Pulses: Normal pulses. Pulmonary:      Effort: Pulmonary effort is normal. No respiratory distress. Abdominal:      General: There is no distension. Musculoskeletal:      Cervical back: Neck supple. Comments: Left forearm elbow to wrist minimal edema palpable. Comparative to the right forearm enlarged. Skin:     General: Skin is warm and dry. Neurological:      Mental Status: He is alert and oriented to person, place, and time. Sensory: No sensory deficit. Coordination: Coordination normal.   Psychiatric:         Thought Content: Thought content normal.         Judgment: Judgment normal.         No results found for this visit on 03/18/22. Recent Results (from the past 2016 hour(s))   Comprehensive Metabolic Panel    Collection Time: 02/02/22 12:08 PM   Result Value Ref Range    Sodium 128 (L) 135 - 144 mEq/L    Potassium 4.0 3.4 - 4.9 mEq/L    Chloride 90 (L) 95 - 107 mEq/L    CO2 24 20 - 31 mEq/L    Anion Gap 14 9 - 15 mEq/L    Glucose 84 70 - 99 mg/dL    BUN 3 (L) 6 - 20 mg/dL    CREATININE 0.47 (L) 0.70 - 1.20 mg/dL    GFR Non-African American >60.0 >60    GFR  >60.0 >60    Calcium 8.9 8.5 - 9.9 mg/dL    Total Protein 7.5 6.3 - 8.0 g/dL    Albumin 4.3 3.5 - 4.6 g/dL    Total Bilirubin 0.3 0.2 - 0.7 mg/dL    Alkaline Phosphatase 93 35 - 104 U/L    ALT 50 (H) 0 - 41 U/L    AST 73 (H) 0 - 40 U/L    Globulin 3.2 2.3 - 3.5 g/dL   Gamma Gt    Collection Time: 02/02/22 12:08 PM   Result Value Ref Range     (H) 0 - 60 U/L       [] Pt was seen by provider for      Minutes  Counseling and coordination of care was done for all assessment diagnosis listed for today with patient and any family/friend present. More than 50% of this visit was spent coordinating cuurent care, obtaining information for prior records, and counseling for current plan of action. Assessment:       Diagnosis Orders   1. Localized swelling of forearm  XR RADIUS ULNA LEFT (2 VIEWS)         Orders Placed This Encounter   Procedures    XR RADIUS ULNA LEFT (2 VIEWS)     Standing Status:   Future     Number of Occurrences:   1     Standing Expiration Date:   3/18/2023     Order Specific Question:   Reason for exam:     Answer:   h/o repair with hardware.   concerned for loosening       No orders of the defined types were placed in this encounter. Medication List          Accurate as of March 18, 2022  4:49 PM. If you have any questions, ask your nurse or doctor. CONTINUE taking these medications    busPIRone 10 MG tablet  Commonly known as: BUSPAR  Take 1 tablet by mouth 2 times daily as needed (anxiety)     clonazePAM 0.5 MG tablet  Commonly known as: KLONOPIN  TAKE 1 TABLET BY MOUTH 3 TIMES A DAY AS NEEDED (BASED ON WITHDRAWAL SYMPTOMS) FOR UP TO 10 DAYS     dilTIAZem 120 MG extended release capsule  Commonly known as: CARDIZEM CD  Take 1 capsule by mouth daily     EPINEPHrine 0.3 MG/0.3ML Soaj injection  Commonly known as: EpiPen 2-Corey  Use as directed for allergic reaction     famotidine 20 MG tablet  Commonly known as: PEPCID  Take 1 tablet by mouth 2 times daily for 7 days     lisinopril 5 MG tablet  Commonly known as: PRINIVIL;ZESTRIL     * Trileptal 300 MG tablet  Generic drug: OXcarbazepine     * OXcarbazepine 600 MG tablet  Commonly known as: TRILEPTAL         * This list has 2 medication(s) that are the same as other medications prescribed for you. Read the directions carefully, and ask your doctor or other care provider to review them with you. Plan:   Return if symptoms worsen or fail to improve. Patient Instructions   Suspect the patient has microtrauma to the interosseous muscle and possibly some of the fascia. Obtaining x-ray. Initial read of the x-ray by my eye reveals hardware in place. No obvious other abnormality. Advised icing 5 to 10 minutes at a time with elevation of the upper extremity. Will wait for r radiology final reading      This note was partially created with the assistance of dictation. This may lead to grammatical or spelling errors. Benoit Sanchez M.D.

## 2022-03-18 NOTE — PATIENT INSTRUCTIONS
Suspect the patient has microtrauma to the interosseous muscle and possibly some of the fascia. Obtaining x-ray. Initial read of the x-ray by my eye reveals hardware in place. No obvious other abnormality. Advised icing 5 to 10 minutes at a time with elevation of the upper extremity.   Will wait for r radiology final reading

## 2022-03-22 ENCOUNTER — TELEPHONE (OUTPATIENT)
Dept: FAMILY MEDICINE CLINIC | Age: 47
End: 2022-03-22

## 2022-03-22 NOTE — TELEPHONE ENCOUNTER
Pt calling said he got the xray results states that he has a huge lump in his arm everyday activities make this worse. Mot sure what he should do? Arm is swollen. Is using ice with no relief.       Pt [phone]

## 2022-03-23 DIAGNOSIS — R22.30 LOCALIZED SWELLING OF FOREARM: Primary | ICD-10-CM

## 2022-06-16 DIAGNOSIS — R22.30 LOCALIZED SWELLING OF FOREARM: Primary | ICD-10-CM

## 2022-07-25 ENCOUNTER — OFFICE VISIT (OUTPATIENT)
Dept: FAMILY MEDICINE CLINIC | Age: 47
End: 2022-07-25
Payer: COMMERCIAL

## 2022-07-25 VITALS
HEIGHT: 67 IN | HEART RATE: 93 BPM | WEIGHT: 134 LBS | SYSTOLIC BLOOD PRESSURE: 122 MMHG | OXYGEN SATURATION: 99 % | DIASTOLIC BLOOD PRESSURE: 84 MMHG | BODY MASS INDEX: 21.03 KG/M2 | TEMPERATURE: 97.6 F

## 2022-07-25 DIAGNOSIS — S68.119D AMPUTATION OF FINGER WITHOUT COMPLICATION, SUBSEQUENT ENCOUNTER: Primary | ICD-10-CM

## 2022-07-25 DIAGNOSIS — Z12.11 SCREEN FOR COLON CANCER: ICD-10-CM

## 2022-07-25 DIAGNOSIS — Z09 HOSPITAL DISCHARGE FOLLOW-UP: ICD-10-CM

## 2022-07-25 PROCEDURE — 99213 OFFICE O/P EST LOW 20 MIN: CPT | Performed by: STUDENT IN AN ORGANIZED HEALTH CARE EDUCATION/TRAINING PROGRAM

## 2022-07-25 PROCEDURE — 1111F DSCHRG MED/CURRENT MED MERGE: CPT | Performed by: STUDENT IN AN ORGANIZED HEALTH CARE EDUCATION/TRAINING PROGRAM

## 2022-07-25 RX ORDER — GABAPENTIN 300 MG/1
CAPSULE ORAL
COMMUNITY
Start: 2022-07-08 | End: 2022-10-25 | Stop reason: ALTCHOICE

## 2022-07-25 RX ORDER — LANOLIN ALCOHOL/MO/W.PET/CERES
CREAM (GRAM) TOPICAL
COMMUNITY
Start: 2022-07-08 | End: 2022-10-25 | Stop reason: ALTCHOICE

## 2022-07-25 RX ORDER — OMEPRAZOLE MAGNESIUM 20 MG
CAPSULE,DELAYED RELEASE (ENTERIC COATED) ORAL
COMMUNITY
Start: 2022-07-08

## 2022-07-25 RX ORDER — OXYCODONE HYDROCHLORIDE 5 MG/1
5 TABLET ORAL EVERY 6 HOURS PRN
Qty: 20 TABLET | Refills: 0 | Status: SHIPPED | OUTPATIENT
Start: 2022-07-25 | End: 2022-08-01

## 2022-07-25 RX ORDER — NICOTINE 14MG/24HR
PATCH, TRANSDERMAL 24 HOURS TRANSDERMAL
COMMUNITY
Start: 2022-07-08

## 2022-07-25 RX ORDER — OXYCODONE HYDROCHLORIDE 5 MG/1
TABLET ORAL
COMMUNITY
Start: 2022-07-08 | End: 2022-07-25 | Stop reason: SDUPTHER

## 2022-07-25 SDOH — ECONOMIC STABILITY: FOOD INSECURITY: WITHIN THE PAST 12 MONTHS, THE FOOD YOU BOUGHT JUST DIDN'T LAST AND YOU DIDN'T HAVE MONEY TO GET MORE.: NEVER TRUE

## 2022-07-25 SDOH — ECONOMIC STABILITY: FOOD INSECURITY: WITHIN THE PAST 12 MONTHS, YOU WORRIED THAT YOUR FOOD WOULD RUN OUT BEFORE YOU GOT MONEY TO BUY MORE.: NEVER TRUE

## 2022-07-25 ASSESSMENT — ENCOUNTER SYMPTOMS
ABDOMINAL PAIN: 0
SORE THROAT: 0
COUGH: 0
SHORTNESS OF BREATH: 0
VOMITING: 0
SINUS PRESSURE: 0

## 2022-07-25 ASSESSMENT — SOCIAL DETERMINANTS OF HEALTH (SDOH): HOW HARD IS IT FOR YOU TO PAY FOR THE VERY BASICS LIKE FOOD, HOUSING, MEDICAL CARE, AND HEATING?: NOT HARD AT ALL

## 2022-07-25 NOTE — PROGRESS NOTES
1 TABLET BY MOUTH TWICE A DAY Yes Historical Provider, MD   oxyCODONE (ROXICODONE) 5 MG immediate release tablet Take 1 tablet by mouth every 6 hours as needed for Pain for up to 7 days.  Yes Mana Browne DO   busPIRone (BUSPAR) 10 MG tablet Take 1 tablet by mouth 2 times daily as needed (anxiety) Yes Concha Castillo MD   OXcarbazepine (TRILEPTAL) 600 MG tablet  Yes Historical Provider, MD   EPINEPHrine (EPIPEN 2-BI) 0.3 MG/0.3ML SOAJ injection Use as directed for allergic reaction Yes ANNETTE Diehl - CNP   OXcarbazepine (TRILEPTAL) 300 MG tablet Take by mouth Yes Historical Provider, MD   dilTIAZem (CARDIZEM CD) 120 MG extended release capsule Take 1 capsule by mouth daily  Patient not taking: Reported on 7/25/2022  Riaz Franz MD        Medications Discontinued During This Encounter   Medication Reason    clonazePAM (KLONOPIN) 0.5 MG tablet Therapy completed    lisinopril (PRINIVIL;ZESTRIL) 5 MG tablet Side effects    famotidine (PEPCID) 20 MG tablet Therapy completed    oxyCODONE (ROXICODONE) 5 MG immediate release tablet REORDER       Allergies   Allergen Reactions    Benadryl [Diphenhydramine]        Past Medical History:   Diagnosis Date    Depression     Hypertension        Past Surgical History:   Procedure Laterality Date    FRACTURE SURGERY Left     radius and ulna-- with mutliple skin grafts       Social History     Socioeconomic History    Marital status: Single     Spouse name: Not on file    Number of children: Not on file    Years of education: Not on file    Highest education level: Not on file   Occupational History    Not on file   Tobacco Use    Smoking status: Every Day     Packs/day: 0.50     Years: 35.00     Pack years: 17.50     Types: Cigarettes    Smokeless tobacco: Never   Vaping Use    Vaping Use: Never used   Substance and Sexual Activity    Alcohol use: Yes     Comment: 30-35 beers daily    Drug use: Yes     Types: Marijuana [place]ian[place] [employer])     Comment: occasional marijuana use Sexual activity: Yes     Partners: Male     Comment: single male partner   Other Topics Concern    Not on file   Social History Narrative    Not on file     Social Determinants of Health     Financial Resource Strain: Low Risk     Difficulty of Paying Living Expenses: Not hard at all   Food Insecurity: No Food Insecurity    Worried About Running Out of Food in the Last Year: Never true    [address] in the Last Year: Never true   Transportation Needs: No Transportation Needs    Lack of Transportation (Medical): No    Lack of Transportation (Non-Medical): No   Physical Activity: Not on file   Stress: Not on file   Social Connections: Not on file   Intimate Partner Violence: Not on file   Housing Stability: Not on file        Family History   Problem Relation Age of Onset    Bipolar Disorder Sister     Bipolar Disorder Brother     Asthma Neg Hx     Cancer Neg Hx     Heart Attack Neg Hx     Heart Disease Neg Hx     High Blood Pressure Neg Hx     High Cholesterol Neg Hx     Stroke Neg Hx        Vitals:    07/25/22 0920   BP: 122/84   Site: Right Upper Arm   Position: Sitting   Cuff Size: Medium Adult   Pulse: 93   Temp: 97.6 °F (36.4 °C)   SpO2: 99%   Weight: 134 lb (60.8 kg)   Height: 5' 7\" (1.702 m)       Estimated body mass index is 20.99 kg/m² as calculated from the following:    Height as of this encounter: 5' 7\" (1.702 m). Weight as of this encounter: 134 lb (60.8 kg). No results for input(s): WBC, RBC, HGB, HCT, MCV, MCH, MCHC, RDW, PLT, MPV in the last 72 hours. No results for input(s): NA, K, CL, CO2, BUN, CREATININE, GLUCOSE, CALCIUM, PROT, LABALBU, BILITOT, ALKPHOS, AST, ALT in the last 72 hours. No results found for: LABA1C    No results found. Physical Exam  Constitutional:       Appearance: Normal appearance. He is normal weight. HENT:      Head: Normocephalic and atraumatic. Eyes:      Extraocular Movements: Extraocular movements intact.       Conjunctiva/sclera: Conjunctivae normal.   Cardiovascular:      Rate and Rhythm: Normal rate and regular rhythm. Pulses: Normal pulses. Heart sounds: Normal heart sounds. Pulmonary:      Effort: Pulmonary effort is normal.      Breath sounds: Normal breath sounds. No wheezing or rales. Musculoskeletal:      Comments: Amputation to the right thumb and index finger, overall surgical sites are healing well without secondary signs of infection, moderate amount of edema   Skin:     General: Skin is warm. Capillary Refill: Capillary refill takes less than 2 seconds. Findings: No rash. Neurological:      Mental Status: He is alert. Psychiatric:         Mood and Affect: Mood normal.         Thought Content: Thought content normal.         Judgment: Judgment normal.       ASSESSMENT/PLAN:  1. 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 Dispense: 20 tablet; Refill: 0    2. Hospital discharge follow-up    - [place] DISCHARGE MEDS RECONCILED W/ CURRENT OUTPATIENT MED LIST    3. Screen for colon cancer    - Ambulatory referral to Gastroenterology      Medications Discontinued During This Encounter   Medication Reason    clonazePAM (KLONOPIN) 0.5 MG tablet Therapy completed    lisinopril (PRINIVIL;ZESTRIL) 5 MG tablet Side effects    famotidine (PEPCID) 20 MG tablet Therapy completed    oxyCODONE (ROXICODONE) 5 MG immediate release tablet REORDER       ---------------------------------------------------------------------  Side effects, adverse effects of the medication prescribed today, as well as treatment plan/ rationale and result expectations have been discussed with the patient who expresses understanding and desires to proceed.      Close follow up to evaluate treatment results and for coordination of care. I have reviewed the patient's medical history in detail and updated the computerized patient record. As always, patient is advised that if symptoms worsen in any way they will proceed to the nearest emergency room. --------------------------------------------------------------------    Return in about 3 months (around 10/25/2022) for f/u chronic conditions, f/u with PCP. An  electronic signature was used to authenticate this note.     --Florentin Harris,  on 7/25/2022 at 10:05 AM

## 2022-08-04 ENCOUNTER — TELEPHONE (OUTPATIENT)
Dept: FAMILY MEDICINE CLINIC | Age: 47
End: 2022-08-04

## 2022-08-04 ENCOUNTER — HOSPITAL ENCOUNTER (OUTPATIENT)
Dept: OCCUPATIONAL THERAPY | Age: 47
Setting detail: THERAPIES SERIES
Discharge: HOME | End: 2022-08-04
Payer: COMMERCIAL

## 2022-08-04 DIAGNOSIS — S68.119D AMPUTATION OF FINGER WITHOUT COMPLICATION, SUBSEQUENT ENCOUNTER: Primary | ICD-10-CM

## 2022-08-04 PROCEDURE — 97166 OT EVAL MOD COMPLEX 45 MIN: CPT

## 2022-08-04 NOTE — TELEPHONE ENCOUNTER
Pt is calling asking for a new referral to [facility] for orthopedic surgery, pt is scheduled with  but it is a month out and the pt does not want to wait a month to be seen. Please advise. Pt is also asking for a new referral to pain management, pt has not heard from [facility] office. Pt would like to get all of this taken care of. Please advise. Pt phone number is [phone].

## 2022-08-04 NOTE — PROGRESS NOTES
[address] [address] [facility]  [address] [facility] [address]  Dept: [phone]  Dept Fax: [phone]      OCCUPATIONAL THERAPY EVALUATION    Occupational Therapy: Initial evaluation    Patient: Evin Santo (86 y.o. male)   Evaluation Date: 2022   :  1975  MRN: [id number]  CSN: [id number]  Account #: [de-identified]   Insurance: Payor: Charles Mis / Plan: [address] [place] [address] / Product Type: *No Product type* /   Insurance ID: [id number] - ([facility]) Secondary Insurance (if applicable): [facility] *   Referring Physician: Amor Cook DO     PCP: Amparo Bruno MD  REFERRAL DATE: 2022  Onset Date: Approximately 2022   Visits to Date: Total # of Visits to Date: 1 Visits Approved: 61 (PT/OT/SP Combined; Secondary- follow Medicare guidelines)    No Show:    Cancelled Appts:      Medical Diagnosis: 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, subsequent encounter [G38.798P] Amputation of finger without complication     Treating diagnosis: Increased right hand pain, Decreased right hand AROM, Decreased right hand strength and coordination       Therapy Time    Time in 1030   Time out 1130   Total treatment minutes 60   Total time code minutes  0 Minutes     Total procedure code minutes:  60 minutes    OT Eval mod complexity 60 minutes for 1 unit, CPT 03580    PERTINENT MEDICAL HISTORY     PMH:  Patient Active Problem List   Diagnosis    Alcohol withdrawal syndrome without complication ([facility] [address]    Bipolar I disorder ([facility] [address]    History of cardiovascular disorder    Raynaud's disease    Pneumothorax, traumatic    Acute pain due to trauma    Closed traumatic nondisplaced fracture of multiple ribs of right side with routine healing    Closed nondisplaced fracture of acromial process of right scapula with routine healing    Hyponatremia    Abrasion of right arm     Past Medical History:   Diagnosis Date Depression     Hypertension      Past Surgical History:   Procedure Laterality Date    FRACTURE SURGERY Left     radius and ulna-- with mutliple skin grafts     Allergies   Allergen Reactions    Benadryl [Diphenhydramine]        Diagnostic imaging: N/A    Medications:    Current Outpatient Medications:     gabapentin (NEURONTIN) 300 MG capsule, PLEASE SEE ATTACHED FOR DETAILED DIRECTIONS, Disp: , Rfl:     [employer] MELATONIN 3 MG TABS tablet, TAKE 3 TABLETS BY MOUTH AT BEDTIME, Disp: , Rfl:     [employer] NICOTINE TRANSDERMAL SYS 14 MG/24HR, APPLY 1 PATCH TO SKIN DAILY, Disp: , Rfl:     thiamine 100 MG tablet, TAKE 1 TABLET BY MOUTH TWICE A DAY, Disp: , Rfl:     dilTIAZem (CARDIZEM CD) 120 MG extended release capsule, Take 1 capsule by mouth daily (Patient not taking: Reported on 7/25/2022), Disp: 90 capsule, Rfl: 2    busPIRone (BUSPAR) 10 MG tablet, Take 1 tablet by mouth 2 times daily as needed (anxiety), Disp: 60 tablet, Rfl: 3    OXcarbazepine (TRILEPTAL) 600 MG tablet, , Disp: , Rfl:     EPINEPHrine (EPIPEN 2-BI) 0.3 MG/0.3ML SOAJ injection, Use as directed for allergic reaction, Disp: 1 each, Rfl: 1    OXcarbazepine (TRILEPTAL) 300 MG tablet, Take by mouth, Disp: , Rfl:     Restrictions: Healing incisions on tip of right hand thumb and index; protective right hand splint             SUBJECTIVE EXAMINATION     Patient's date of birth confirmed: Yes     Transfer of pt care required: No     Support contact: - home all of the time    Social History:  Social History  Lives With: Spouse ()  Type of Home: House  Home Layout: Two level, Laundry in basement, Able to Live on Main level with bedroom/bathroom, Bed/Bath upstairs (Full flight)  Home Access: Stairs to enter with rails  Entrance Stairs - Rails: Left  Entrance Stairs - Number of Steps: 2  Bathroom Shower/Tub: Tub/Shower unit, Walk-in shower  Bathroom Equipment: Shower chair, Hand-held shower  Home Equipment:  (Righ hand splint)    Pain Screening:   Pain Cognition:  Cognition  Overall Cognitive Status: [facility]  Perception  Overall Perceptual Status: [facility]   Orientation: [facility]  Communication:  [facility]    Vision/Hearing: Occasional glasses for distance/reading; [facility] hearing    Perception:  Perception  Overall Perceptual Status: [facility]    ADL's:  Feeding: Patient is feeding self with right hand as best as possible; Patient able to cut foods independently; Patient is opening packages and containers independent  Grooming: Patient performing independently with increased time  Bathing: Patient performing independently- pump bottles versus squeeze  UE dressing: Patient independent with overhead shirts- has not tried button shirts yet  LE dressing: Patient independent with slip on shorts;  Patient wearing slip on shoes; difficulty tying shoes  Toileting: Patient performing independently   Toilet transfer: Patient performing independently  Tub/Shower transfer: Patient performing independently    Cooking: Patient's spouse performs majority of cooking; Patient will grill; however, has not attempted  Cleaning: Patient has attempted some cleaning with difficulty   Laundry: Patient able to perform independently; carrying laundry basket using hips     Sleep: Patient reports pain occasionally wakes him up    Medication management: Patient able to open pill bottles independently    OBJECTIVE EXAMINATION     Observations:  Symmetrical posture       Hand Dominance: Right    Upper Extremity Strength and Range of Motion    Right  Left    MMT A P Norm  A P MMT   Shoulder          Flexion 4+/5 WFL NT 0-180 WFL NT 4+/5   Abduction 4+/5 WFL NT 0-180 WFL NT 4+/5   Internal Rotation 4+/5 WFL NT 0-80 WFL NT 4+/5   External Rotation 4+/5 WFL NT 0-60 WFL NT 4+/5   Elbow          Flexion 4+/5 WFL NT 0-150 WFL NT 4+/5   Extension 4+/5 WFL -0 WFL NT 4+/5   Pronation 4/5 WFL NT 0-80 WFL NT 4/5   Supination 4/5 WFL NT 0-80 WFL NT 4/5   Wrist          Flexion 4/5 WFL NT 0-70 WFL NT 4/5   Extension  4/5 WFL NT 0-60 WFL NT 4/5   Ulnar deviation NT WFL NT 0-30 WFL NT NT   Radial Deviation  NT WFL NT 0-20 WFL NT NT   Comments:       Hand Range of Motion    Right  Left   Normal  MP PIP DIP  MP PIP DIP    0-90 0-100 0-70  0-90 0-100 0-70    A P A P A P  A P A P A P   Index                Extension 0 NT N/A N/A N/A N/A  WFL NT WFL NT WFL NT   Flexion 75 NT N/A N/A N/A N/A  WFL NT WFL NT WFL NT   Middle                Extension WFL NT WFL NT WFL NT  WFL NT WFL NT WFL NT   Flexion WFL NT WFL NT WFL NT  WFL NT WFL NT WFL NT   Ring                Extension WFL NT WFL NT WFL NT  WFL NT WFL NT WFL NT   Flexion  WFL NT WFL NT WFL NT  WFL NT WFL NT WFL NT   Little                 Extension WFL NT WFL NT WFL NT  WFL NT WFL NT WFL NT   Flexion  WFL NT WFL NT WFL NT  WFL NT WFL NT WFL NT   Comments:       Right   Left Norms    A P  A P    Thumb         MP Flexion 30 NT  WFL NT 0-70   IP Flexion N/A N/A  WFL NT 0-90   Radial Abduction 70 NT  WFL NT 0-50   Palmar Adduction 70 NT  WFL NT 0-40   Comments:    Opposition  Right Hand: Patient able to oppose all digits to thumb with increased pain with 5th digit  Left Hand: WFL     Distance Thumb Tip from Head of 5th MC:   Right Hand: Limited due to nature of amputation  Left Hand: WFL    Edema  Patient with swelling throughout right hand index and thumb       & Pinch Strength  Average of 3 tries Right Norm Left Norm    (lb) 68 Male age 39-53: 103 lbs  80 Male age 39-53: 95 lbs    Snow Pinch (lb) 6 Male age 39-53: 24.0 lbs  15 Male age 39-53: 18.0 lbs    Lateral Pinch (lb) 8 Male age 39-53: 20.5 lbs  15 Male age 39-53: 19.0 lbs    Comments: Patient does not utilize index digit with snow pinch of right hand    Coordination & Dexterity   Right Norm Left Norm   Nine Hole Peg Test  (seconds) 27.21 Male age 39-53: 19.9 s  17.80 Male age 39-53: 20.1 s    Box & Blocks  (# of blocks) 61 Male age 39-53: 70.6 59 Male age 39-53: 70.9   Comments: Patient uses an adaptive /pinch to complete 9-HPT with right hand; Patient attempts to use thumb and 3rd digit with Box and Blocks; however, too sensitive to complete    Functional Mobility:   Balance  Sitting Balance: Independent  Standing Balance: Independent  Functional Mobility  Functional - Mobility Device: No device  Activity: To/From therapy gym  Assist Level: Independent    Skin Integrity:   Patient noted to have scabbed area to tip of thumb with visible sutures remaining; patient with healing incision on tip of index finger with one area of scabbing noted. Patient with raised, calloused area on volar surface of right index finger    Sensation:   Patient reports constant numbness and tingling throughout entire right hand index and thumb    Tone:   WFL on evaluation     Joint Mobility:  Patient with decreased mobility throughout right hand index and thumb    Palpation/Tenderness:  Patient with increased sensitivity to touch to right hand index and thumb    Education/Barriers to Learning:   OT Education  OT Education: OT Role, Plan of Care   Barriers: None   Education on this date: OT role and POC     Outcome Measure(s) Completed:   QuickDASH  Open a tight or new jar: Moderate Difficulty  Do heavy household chores (e.g., wash walls, floors): Mild Difficulty  Melany a shopping bag or briefcase: Moderate Difficulty  Wash your back: Moderate Difficulty  Use a knife to cut food:  Moderate Difficulty  Recreational activities in which you take some force or impact through your arm, shoulder, or hand (e.g., golf, hammering, tennis, etc.): Severe Difficulty  During the past week, to what extent has your arm, shoulder or hand problem interfered with your normal social activities with family, friends, neighbors or groups?: Moderately  During the past week, were you limited in your work or other regular daily activities as a result of your arm, shoulder or hand problem?: Very Limited  Arm, shoulder or hand pain: Moderate  Tingling (pins and needles) in your arm, barrier(s): N/A       Eval Complexity Decision Making: Medium Complexity  Occupational Profile/History : Medium  Assessment(s) that identifies: Medium  Assistance Modification: Medium    TREATMENT PLAN     PLAN OF CARE: Evaluation and patient rights have been reviewed and patient/caregiver agrees with plan of care. Yes. If no, explain. FREQUENCY: 2 days per week       DURATION: 4-6 weeks or 8-12 visits     Treatment may include any combination of the following:  [x] Evaluate and Treat  [x] Re-Evaluation   [x] CARRILLO able to work with patient   [x] Instruction in HEP [x] Discharge plan: Will assess patient after established visits to determine need for continued therapy. [x] ADL Training, Coordination/Dexterity Training, Desensitization, Edema Management, Neuromuscular Re-education, Manual Techniques , Pain Management, PROM/Stretching/AAROM/AROM, Sensory Integration/Processing , Strengthening/Graded Therapeutic Activity   , Wound Care/Scar Management         Pt. and/or family actively involved in establishing Plan of Care and Goals: Yes    Patient/ Caregiver education and instruction:  OT Role, Plan of Care      OT Treatment Completed:  None on evaluation      GOALS     Long Term Goals  Time Frame for Long term goals : 2x/week for 4-6 weeks, 8-12 visits  Long Term Goal 1: Patient will tolerate touch to right hand index and thumb with reports of pain </=2/10. Long Term Goal 2: Patient will increase right hand index and thumb MP flexion by 20* to increase functional use of hand in daily activities. Long Term Goal 3: Patient will demonstrate [facility] right hand coordination as measured by a 10 second improvement on 9-HPT. Long Term Goal 4: Patient will increase right hand  strength by 15-20# to increase ease with I/ADLs. Long Term Goal 5: Patient will increase right hand pinch strength by 5# to increase functional use of hand in daily activities.   Long Term Goal 6: Patient will be independent with all recommended HEP for pain and edema management, ROM, coordination, and strength. Electronically signed by MICHELA Diaz  on 8/4/2022 at 1:36 PM    Falls Risk Assessment    Age: 0-59 = 0          60-69= 1            > 70= 2 History of Falls:   0  Falls  last 6 mo = 0    1  fall  Last  6 mo = 1   1-3 falls last 6 mo = 2 Medical History:   Parkinsons, CVA,HTN, vertigo, >4 meds, use of assistive device (1pt.for each)  Mental Status:  A & O x 3 = 0  Disoriented to person, place, or time = 2     Date: 8/4/2022                                                  Age:   1                                                         Falls: 0                                                          PMH: 1                                                          Mental: 0                                                       Total:  1                                                        *Patient 4 or younger:   Vestibular:     Signature: ARI Gill/JACQUELINE                                                      High Risk for Falls: >8  Intermediate Risk for Falls: 4-8   Low Risk for Falls: <4    * All pediatric patients (age 3 or younger) and vestibular patients will be considered high risk for falls- scoring does not need to be completed - treat as high risk. Interventions     Low Risk: Monitor for changes, reassess every three months. Intermediate Risk: Supervision for most activities, direct contact with new activities or equipment, reassess monthly. High Risk: Stand by assitance at all times, reassess monthly. Electronically signed by MICHELA Diaz on 8/4/2022 at 9:98 PM      I certify that the above Occupational Therapy Services are being furnished while the patient is under my care. I agree with the treatment plan and certify that this therapy is necessary.       Physician's Signature:  ___________________________   Date:_______                                                                   Florence Godoy Aleta Aguirre DO        Physician Comments: _______________________________________________    Please sign and return to Milana Fenton. Please fax to the location listed below.  Chantelle Rodas for this referral!

## 2022-08-05 NOTE — TELEPHONE ENCOUNTER
Referral placed.  Calling pt with Dr Modesto Paris number     [facility]

## 2022-08-08 ENCOUNTER — HOSPITAL ENCOUNTER (OUTPATIENT)
Dept: OCCUPATIONAL THERAPY | Age: 47
Setting detail: THERAPIES SERIES
Discharge: HOME | End: 2022-08-08
Payer: COMMERCIAL

## 2022-08-08 PROCEDURE — 97140 MANUAL THERAPY 1/> REGIONS: CPT

## 2022-08-08 PROCEDURE — 97530 THERAPEUTIC ACTIVITIES: CPT

## 2022-08-08 NOTE — PROGRESS NOTES
[facility]      Occupational Therapy: Daily Note   Patient: Suzette Fox (6 y.o. male)   Date:   Plan of Care Certification Period:      :  1975  MRN: [id number]  CSN: [id number]   Insurance: Payor: Alexandria Silva / Plan: [address] [place] [address] / Product Type: *No Product type* /   Insurance ID: [id number] - ([facility]) Secondary Insurance (if applicable): [employer] *   Referring Physician: Charles Ragland DO     PCP: Ilene Beasley MD Visits to Date: Total # of Visits to Date: 2   Progress note:Progress Note Counter: -  Visits Approved: 61 (PT/OT/SP combined; Secondary- follow Medicare guidelines)    No Show:    Cancelled Appts:      Medical Diagnosis: 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 Learning/Language barrier: no       HEP/Strategies/Orthosis Compliance: N/A- Therapist provided patient with desensitization and scar massage handouts as a start of HEP. Restrictions:   Healing incisions on tip of right hand thumb and index; protective right hand splint       OBJECTIVE EXAMINATION         TREATMENT     Focus of treatment was on the following:   edema management, education/training, and fine motor/dexterity         Therapeutic Activities:  (CPT 93393) Treatment Reasoning     Therapist performed desensitization to R thumb and index finger using 4 different soft textures.  Patient with increased sensitivity to index finger. Patient completed  strength exercise with red gripper x15 with minimal difficulty. Patient engaged in grasp techniques while gripping plastic cones, focusing on using R index and thumb grasping. Patient picked up 3 sets of 15 cones and stacked on top of each other with minimal difficulty. - to improve fine motor coordination/dexterity, R hand grasp and  strength, and to decrease sensitivity to R thumb and index finger. Manual Therapy: (CPT 08526) Treatment Reasoning      Therapist performed manual massage on R thumb and index finger x10 min. Patient tolerated well. To reduce edema. Patient Education/HEP:   Continue recommended HEP/activities. ASSESSMENT       Assessment: Pt tolerated treatment well. Patient very receptive to education and motivated to participate in therapy session. Post Treatment Pain: 4/10    Patient's Activity Tolerance:       Patient tolerated treatment well. GOALS         Long Term Goals  Time Frame for Long term goals : 2x/week for 4-6 weeks, 8-12 visits  Long Term Goal 1: Patient will tolerate touch to right hand index and thumb with reports of pain </=2/10. Long Term Goal 2: Patient will increase right hand index and thumb MP flexion by 20* to increase functional use of hand in daily activities. Long Term Goal 3: Patient will demonstrate [facility] right hand coordination as measured by a 10 second improvement on 9-HPT. Long Term Goal 4: Patient will increase right hand  strength by 15-20# to increase ease with I/ADLs. Long Term Goal 5: Patient will increase right hand pinch strength by 5# to increase functional use of hand in daily activities. Additional Goals?: Yes  Long Term Goal 6: Patient will be independent with all recommended HEP for pain and edema management, ROM, coordination, and strength.          TREATMENT PLAN   Continue POC  REQUIRES OT FOLLOW-UP: Yes        Electronically signed by Wilfrid Francois Cydney Eagle  on 8/8/2022 at 12:22 PM  POC NOTE

## 2022-08-11 ENCOUNTER — APPOINTMENT (OUTPATIENT)
Dept: OCCUPATIONAL THERAPY | Age: 47
End: 2022-08-11
Payer: COMMERCIAL

## 2022-08-15 ENCOUNTER — HOSPITAL ENCOUNTER (OUTPATIENT)
Dept: OCCUPATIONAL THERAPY | Age: 47
Setting detail: THERAPIES SERIES
Discharge: HOME | End: 2022-08-15
Payer: COMMERCIAL

## 2022-08-15 PROCEDURE — 97530 THERAPEUTIC ACTIVITIES: CPT

## 2022-08-15 PROCEDURE — 97140 MANUAL THERAPY 1/> REGIONS: CPT

## 2022-08-15 NOTE — PROGRESS NOTES
[facility]      Occupational Therapy: Daily Note   Patient: Candie Redi (34 y.o. male)   Date:   Plan of Care Certification Period:      :  1975  MRN: [id number]  CSN: [id number]   Insurance: Payor: Dalton Clayton / Plan: [address] [place] [address] / Product Type: *No Product type* /   Insurance ID: [id number] - ([facility]) Secondary Insurance (if applicable): [employer] *   Referring Physician: Eloisa Benson DO     PCP: Nelida Kirkpatrick MD Visits to Date: Total # of Visits to Date: 3   Progress note:Progress Note Counter: -  Visits Approved: 61 (PT/OT/SLP combined; Secondary - follow Medicare guidelines)    No Show:    Cancelled Appts:      Medical Diagnosis: 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 He has noticed an improvement with handwriting and his family/friends have noticed an improvement with card dealing during games. Learning/Language barrier: No       HEP/Strategies/Orthosis Compliance: Patient verbally confirmed compliant with HEP's          Restrictions:   Healing incisions on tip of right hand thumb and index; protective right hand splint       OBJECTIVE EXAMINATION         TREATMENT     Focus of treatment was on the following:   ROM, desensitization, coordination     Patient states he has been using the brush consistently at home for desensitization.  Therapist provides additional desensitization techniques this date via massage to right hand index and thumb followed by introduction to various textured wands. Patient tolerates well with increased sensitivity at the tip of index and thumb. Patient instructed to begin taking textures to fingers at home as part of HEP. Patient engages in AROM to index and thumb. Patient performs 1 set x10 reps flat fisting with 10 second holds. Patient instructed to perform only until a stretch is felt and no pain. Patient also performs thumb MP flexion- 1 set x 10 reps with 10 second holds with good tolerance. Patient performs right hand ab/adduction x10 with good tolerance. Patient trials a blue foam block this date for increased right hand strength. Patient demonstrates ability to  with thumb and digits 3-5 doing majority of the work. Patient then attempts pinching with index and thumb followed by pushing index into block. Patient tolerates well with blue block issued this date. Patient was issued a size small 3/4 finger compression glove to assist with edema management and provide some protection. Patient Education/HEP:   flat fisting, thumb MP flexion, ab/adduction; blue foam block, compression glove        ASSESSMENT       Assessment:   Patient tolerated treatment session well. Patient's incisions and scabbing are healing well with essentially no scabbing to index finger this date. Patient continues to demonstrates increased sensitivity, decreased ROM and strength, and decreased functional use of right hand. Patient would benefit from continued occupational therapy to address these deficits and improve overall quality of life. Post Treatment Pain: 3/10    Patient's Activity Tolerance: Patient tolerated treatment well              GOALS         Long Term Goals  Time Frame for Long term goals : 2x/week for 4-6 weeks, 8-12 visits  Long Term Goal 1: Patient will tolerate touch to right hand index and thumb with reports of pain </=2/10.   Long Term Goal 2: Patient will increase right hand index and thumb MP flexion by 20* to increase functional use of hand in daily activities. Long Term Goal 3: Patient will demonstrate [facility] right hand coordination as measured by a 10 second improvement on 9-HPT. Long Term Goal 4: Patient will increase right hand  strength by 15-20# to increase ease with I/ADLs. Long Term Goal 5: Patient will increase right hand pinch strength by 5# to increase functional use of hand in daily activities. Long Term Goal 6: Patient will be independent with all recommended HEP for pain and edema management, ROM, coordination, and strength. TREATMENT PLAN     Patient to continue progress towards current POC.  Next Visit: 8/18/2022           Electronically signed by MICHELA Angeles  on 8/15/2022 at 10:32 AM  POC NOTE

## 2022-08-18 ENCOUNTER — HOSPITAL ENCOUNTER (OUTPATIENT)
Dept: OCCUPATIONAL THERAPY | Age: 47
Setting detail: THERAPIES SERIES
Discharge: HOME | End: 2022-08-18
Payer: COMMERCIAL

## 2022-08-18 PROCEDURE — 97530 THERAPEUTIC ACTIVITIES: CPT

## 2022-08-18 PROCEDURE — 97140 MANUAL THERAPY 1/> REGIONS: CPT

## 2022-08-18 NOTE — PROGRESS NOTES
[facility]      Occupational Therapy: Daily Note   Patient: Al Anderson (1 y.o. male)   Date:   Plan of Care Certification Period:      :  1975  MRN: [id number]  CSN: [id number]   Insurance: Payor: Juan Manuel Brady / Plan: [address] [place] [address] / Product Type: *No Product type* /   Insurance ID: [id number] - ([facility]) Secondary Insurance (if applicable): Jennifer Brown *   Referring Physician: Igor Lemus DO     PCP: Barrie Soto MD Visits to Date: Total # of Visits to Date: 4   Progress note:Progress Note Counter: 3/8-  Visits Approved: 61 (PT/OT/SLP combined; Secondary- follow Medicare guidelines)    No Show:    Cancelled Appts:      Medical Diagnosis: 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, subsequent encounter [T87.114H] Amputation of finger without complication        Therapy Time    Time in 1000   Time out 1050   Total treatment minutes 50   Total time code minutes  50 Minutes        OT Manual therapy 15 minutes for 1 unit(s), CPT 85248  OT Therapeutic activities 35 minutes for 2 unit(s), CPT 68689       SUBJECTIVE EXAMINATION     Patient's date of birth confirmed: Yes     Pain Level:   2-3/10    Patient Comments:   Patient reports more scabbing fell off with increased sensitivity to thumb. Learning/Language barrier: No       HEP/Strategies/Orthosis Compliance: Patient verbally confirmed compliant with HEP's          Restrictions:   Healing incisions on tip of right hand thumb and index; protective right hand splint       OBJECTIVE EXAMINATION         TREATMENT     Focus of treatment was on the following:   ROM, desensitization, coordination     Therapist provides desensitization techniques this date via massage to right hand index and thumb followed by direct myofascial release techniques as follows: transverse plane release to tip of index.  Patient tolerates well with continued increased sensitivity at the tip of index and thumb. Patient engages in AROM to index. Patient performs 1 set x10 reps flat fisting with 10 second holds. Patient instructed to perform only until a stretch is felt and no pain. Patient engages in right hand strengthening activity with velcro checkers. Patient instructed to remove all checkers using thumb and index finger pinch. Patient able to perform with minimal difficulty; however, when hand slips, increased pain and sensitivity reported. Patient returns all checkers to board using 1# resistive clip and tip to tip pinch with right hand index/thumb, tolerating well. Patient engages in fine motor coordination activity with 5x5 peg board and pop beads. Patient initially unable to  smaller pop beads with right hand thumb and index due to decreased sensation; however, as activity advances, patient with increased success. Patient fills entire peg board (25 pieces) and then utilizes 2# resistive clip to return all beads to storage container. Patient with increased fatigue noted at end of activity. Patient Education/HEP:   Continue recommended HEP/activities. ASSESSMENT       Assessment:   Patient tolerated treatment session well. Patient's incisions and scabbing are healing well with essentially no scabbing to index finger this date. Patient continues to demonstrates increased sensitivity, decreased ROM and strength, and decreased functional use of right hand. Patient would benefit from continued occupational therapy to address these deficits and improve overall quality of life. Post Treatment Pain: 1-2/10    Patient's Activity Tolerance: Patient tolerated treatment well              GOALS         Long Term Goals  Time Frame for Long term goals : 2x/week for 4-6 weeks, 8-12 visits  Long Term Goal 1: Patient will tolerate touch to right hand index and thumb with reports of pain </=2/10.   Long Term Goal 2: Patient will increase right hand index and thumb MP flexion by 20* to increase functional use of hand in daily activities. Long Term Goal 3: Patient will demonstrate [facility] right hand coordination as measured by a 10 second improvement on 9-HPT. Long Term Goal 4: Patient will increase right hand  strength by 15-20# to increase ease with I/ADLs. Long Term Goal 5: Patient will increase right hand pinch strength by 5# to increase functional use of hand in daily activities. Long Term Goal 6: Patient will be independent with all recommended HEP for pain and edema management, ROM, coordination, and strength. TREATMENT PLAN     Patient to continue progress towards current plan of care.  Next Visit: 8/22/2022    New compression glove (size XS-S)           Electronically signed by MICHELA Hutchison  on 8/18/2022 at 10:58 AM  POC NOTE

## 2022-08-22 ENCOUNTER — HOSPITAL ENCOUNTER (OUTPATIENT)
Dept: OCCUPATIONAL THERAPY | Age: 47
Setting detail: THERAPIES SERIES
Discharge: HOME | End: 2022-08-22
Payer: COMMERCIAL

## 2022-08-22 PROCEDURE — 97530 THERAPEUTIC ACTIVITIES: CPT

## 2022-08-22 NOTE — PROGRESS NOTES
[facility]      Occupational Therapy: Daily Note   Patient: Edis Rhoades (27 y.o. male)   Date:   Plan of Care Certification Period:      :  1975  MRN: [id number]  CSN: [id number]   Insurance: Payor: HudsonMarleny Dubonshubhammar[address] / Plan: [address] [place] [address] / Product Type: *No Product type* /   Insurance ID: [id number] - ([facility]) Secondary Insurance (if applicable): Josep Damon *   Referring Physician: Thelma Espinoza DO     PCP: Vishal Han MD Visits to Date: Total # of Visits to Date: 5   Progress note:Progress Note Counter: -  Visits Approved: 61 (PT/OT/SLP combined; secondary- follow Medicare guidelines)    No Show:    Cancelled Appts:      Medical Diagnosis: 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, subsequent encounter [O34.665H] Amputation of finger without complication        Therapy Time    Time in 0930   Time out 1000   Total treatment minutes 30   Total time code minutes  30 Minutes        OT Therapeutic activities 30 minutes for 2 unit(s), CPT 52190       SUBJECTIVE EXAMINATION     Patient's date of birth confirmed: Yes     Pain Level:   2/10    Patient Comments:   Patient states his index finger is less sensitive this date. Learning/Language barrier: No       HEP/Strategies/Orthosis Compliance: Patient verbally confirmed compliant with HEP's          Restrictions:   Healing incisions on tip of right hand thumb and index       OBJECTIVE EXAMINATION         TREATMENT     Focus of treatment was on the following:   ROM, desensitization, strength, coordination     Therapist provides desensitization techniques this date via massage to right hand index and thumb followed by direct myofascial release techniques as follows: transverse plane release to distal phalange of right index. Patient tolerates well with continued increased sensitivity at the tip of index and thumb. Patient engages in AROM to index.  Patient performs 1 set x10 reps flat fisting with 10 second holds. Patient instructed to perform only until a stretch is felt and no pain. Patient engages in right hand strengthening activity with medium resistance hand gripper. Patient completes 1 set x 10 reps with good overall tolerance; however, increased soreness reported in web space. Patient also engages in tip pinching using 2# resistive clip with right hand index and thumb. Patient with increased difficulty; however, able to tolerate 2 sets x 10 reps. Patient engages in fine motor coordination activity with small pegs. Patient able to  20 small pegs using index and thumb of right hand to place into storage container. Patient previously unable to complete. Patient was fitted with and issued an XS right hand compression glove this date. Patient Education/HEP:   Continue recommended HEP/activities. ASSESSMENT       Assessment:   Patient tolerated treatment session well. Patient's incisions and scabbing are healing well with essentially no scabbing to index finger this date. Patient continues to demonstrates increased sensitivity, decreased ROM and strength, and decreased functional use of right hand. Patient would benefit from continued occupational therapy to address these deficits and improve overall quality of life. Post Treatment Pain: 2/10    Patient's Activity Tolerance: Patient tolerated treatment well              GOALS         Long Term Goals  Time Frame for Long term goals : 2x/week for 4-6 weeks, 8-12 visits  Long Term Goal 1: Patient will tolerate touch to right hand index and thumb with reports of pain </=2/10. Long Term Goal 2: Patient will increase right hand index and thumb MP flexion by 20* to increase functional use of hand in daily activities. Long Term Goal 3: Patient will demonstrate [facility] right hand coordination as measured by a 10 second improvement on 9-HPT.   Long Term Goal 4: Patient will increase right hand  strength by 15-20# to increase ease with I/ADLs. Long Term Goal 5: Patient will increase right hand pinch strength by 5# to increase functional use of hand in daily activities. Long Term Goal 6: Patient will be independent with all recommended HEP for pain and edema management, ROM, coordination, and strength. TREATMENT PLAN     Patient to continue progress towards current plan of care.  Next Visit: 8/25/2022           Electronically signed by ARI Blanca/L  on 8/22/2022 at 11:11 AM  POC NOTE

## 2022-08-25 ENCOUNTER — HOSPITAL ENCOUNTER (OUTPATIENT)
Dept: OCCUPATIONAL THERAPY | Age: 47
Setting detail: THERAPIES SERIES
Discharge: HOME | End: 2022-08-25
Payer: COMMERCIAL

## 2022-08-25 PROCEDURE — 97530 THERAPEUTIC ACTIVITIES: CPT

## 2022-08-25 PROCEDURE — 97140 MANUAL THERAPY 1/> REGIONS: CPT

## 2022-08-25 NOTE — PROGRESS NOTES
[facility]      Occupational Therapy: Daily Note   Patient: Suzette Fox (11 y.o. male)   Date: [birth year]  Plan of Care Certification Period:      :  1975  MRN: [id number]  CSN: [id number]   Insurance: Payor: Alexandria Silva / Plan: [address] [place] [address] / Product Type: *No Product type* /   Insurance ID: [id number] - ([facility]) Secondary Insurance (if applicable): Tejas Baca *   Referring Physician: Charles Ragland DO     PCP: Ilene Beasley MD Visits to Date: Total # of Visits to Date: 6   Progress note:Progress Note Counter: -  Visits Approved: 61 (units)    No Show:    Cancelled Appts:      Medical Diagnosis: 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, subsequent encounter [S68.119D] Amputation of finger without complication        Therapy Time    Time in 1000   Time out 1045   Total treatment minutes 45   Total time code minutes  45 Minutes        OT Manual therapy 10 minutes for 1 unit(s), CPT 25630  OT Therapeutic activities 35 minutes for 2 unit(s), CPT 70685       SUBJECTIVE EXAMINATION     Patient's date of birth confirmed: Yes     Pain Level:   2-3/10    Patient Comments:   Patient states he thinks 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 Learning/Language barrier: No       HEP/Strategies/Orthosis Compliance: Patient verbally confirmed compliant with HEP's          Restrictions:   Healing incisions to right hand thumb and index       OBJECTIVE EXAMINATION         TREATMENT     Focus of treatment was on the following:   ROM, desensitization, strength, coordination     Therapist provides desensitization techniques this date via massage to right hand index and thumb. Patient tolerates well with continued increased sensitivity at the tip of index and thumb. Patient engages in AROM to index. Patient performs 1 set x10 reps flat fisting with 10 second holds.  Patient instructed to perform only until a stretch is felt and no pain. Following active range, patient provided with PROM into right hand MP flexion- 1 set x 10 reps with 5-10 second holds. Patient tolerates WFL range well. Patient engages in multiple activities to increase right hand fine motor coordination. Patient able to  miniature [employer] game pieces and place into game board using index and thumb. Patient with minimal drops noted. Patient engages in additional fine motor coordination activity with small pegs. Patient able to  small pegs with right hand index and thumb to place into board this date. Patient with frequent drops noted this date; however, able to place into board versus just picking up. Patient removes all pegs from board and returns to storage container using a 4# resistive clip with good tolerance. Patient was educated on right hand strengthening activities with red theraputty this date. Patient tolerates manipulating putty between right hand thumb and index finger well. Patient performs gross gripping, pinch and pull, rolling and pinching/pushing, as well as digit flexion/extension into putty. Patient reports increased challenge; however, able to tolerate. Red theraputty and handout issued this date. Patient attempts additional hand strengthening activity with interconnecting jigsaw puzzle pieces. Patient able to assemble structure; however, too resistive to remove this date. Patient Education/HEP:   Red theraputty        ASSESSMENT       Assessment:   Patient tolerated treatment session well. Patient's incisions are healing well with essentially no scabbing to index finger or thumb this date. Patient continues to demonstrates increased sensitivity, decreased ROM and strength, and decreased functional use of right hand. Patient would benefit from continued occupational therapy to address these deficits and improve overall quality of life.         Post Treatment Pain: 3-4/10    Patient's Activity Tolerance: Patient tolerated treatment well              GOALS         Long Term Goals  Time Frame for Long term goals : 2x/week for 4-6 weeks, 8-12 visits  Long Term Goal 1: Patient will tolerate touch to right hand index and thumb with reports of pain </=2/10. Long Term Goal 2: Patient will increase right hand index and thumb MP flexion by 20* to increase functional use of hand in daily activities. Long Term Goal 3: Patient will demonstrate [facility] right hand coordination as measured by a 10 second improvement on 9-HPT. Long Term Goal 4: Patient will increase right hand  strength by 15-20# to increase ease with I/ADLs. Long Term Goal 5: Patient will increase right hand pinch strength by 5# to increase functional use of hand in daily activities. Long Term Goal 6: Patient will be independent with all recommended HEP for pain and edema management, ROM, coordination, and strength. TREATMENT PLAN     Patient to continue progress towards current plan of care.  Next Visit: 8/29/2022    Trial fluidotherapy next visit           Electronically signed by MICHELA Angeles  on 8/25/2022 at 11:03 AM  POC NOTE

## 2022-08-29 ENCOUNTER — HOSPITAL ENCOUNTER (OUTPATIENT)
Dept: OCCUPATIONAL THERAPY | Age: 47
Setting detail: THERAPIES SERIES
Discharge: HOME | End: 2022-08-29
Payer: COMMERCIAL

## 2022-08-29 PROCEDURE — 97530 THERAPEUTIC ACTIVITIES: CPT

## 2022-08-29 PROCEDURE — 97140 MANUAL THERAPY 1/> REGIONS: CPT

## 2022-08-29 SDOH — HEALTH STABILITY: PHYSICAL HEALTH: ON AVERAGE, HOW MANY DAYS PER WEEK DO YOU ENGAGE IN MODERATE TO STRENUOUS EXERCISE (LIKE A BRISK WALK)?: 5 DAYS

## 2022-08-29 SDOH — HEALTH STABILITY: PHYSICAL HEALTH: ON AVERAGE, HOW MANY MINUTES DO YOU ENGAGE IN EXERCISE AT THIS LEVEL?: 120 MIN

## 2022-08-29 NOTE — PROGRESS NOTES
[facility]      Occupational Therapy: Daily Note   Patient: Jorge Alberto Romero (58 y.o. male)   Date:   Plan of Care Certification Period:  22   :  1975  MRN: [id number]  CSN: [id number]   Insurance: Payor: Crystal Buggy / Plan: [address] [place] [address] / Product Type: *No Product type* /   Insurance ID: [id number] - ([facility]) Secondary Insurance (if applicable): Lanre Regalado *   Referring Physician: Simon Veliz DO     PCP: Moe Douglas MD Visits to Date: Total # of Visits to Date: 7   Progress note:Progress Note Counter: -  Visits Approved: 61 (PT/OT/SLP combined; secondary - follow Medicare guidelines)    No Show:    Cancelled Appts:      Medical Diagnosis: 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, subsequent encounter [S68.997D] Amputation of finger without complication        Therapy Time    Time in 1100   Time out 1157   Total treatment minutes 57   Total time code minutes  57 Minutes        OT Manual therapy 23 minutes for 2 unit(s), CPT 26203  OT Therapeutic activities 34 minutes for 2 unit(s), CPT 97638       SUBJECTIVE EXAMINATION     Patient's date of birth confirmed: Yes     Pain Level:   Pt denies pain    Patient Comments:   Patient states he has an appointment with the orthopedic surgeon tomorrow. Learning/Language barrier: No       HEP/Strategies/Orthosis Compliance: Patient verbally confirmed compliant with HEP's          Restrictions:   Healing incisions to right index and thumb       OBJECTIVE EXAMINATION         TREATMENT     Focus of treatment was on the following:   ROM, desensitization, strength, coordination     Therapist provides desensitization techniques this date via massage to right hand index and thumb. Patient tolerates well with continued increased sensitivity at the tip of index and thumb.  Patient was educated on scar massage to volar surface of right index finger- massaging with increased pressure in vertical, horizontal, and circular motions. Patient reports good understanding. Patient engages in AROM to index. Patient performs 1 set x10 reps flat fisting with 10 second holds. Patient instructed to perform only until a stretch is felt and no pain. Following active range, patient provided with PROM into right hand MP flexion- 1 set x 10 reps with 5-10 second holds. Patient tolerates WFL range well. Patient was educated on place and hold exercises this date with therapist providing passive range to maximum stretch followed by patient holding. Patient reports less pain with this method and will start performing as part of HEP. Patient engages in multiple activities to increase right hand fine motor coordination. Patient completes babar stacking activity. Patient unable to grasp pennies directly from tabletop with right hand index and thumb; however, slides to edge of table to pinch. Patient able to form five stacks of 10 pennies in this manner. Patient was instructed to return pennies to slotted container, one at a time, for increased in-hand manipulation skills. Patient tolerates well using thumb and middle finger. Patient engages in right hand strengthening activity with miniature clothespins. Patient able to pinch and place ~20 miniature clothespins onto game board. Patient requires increased time and trials to manipulate clips into position; however, able to place onto board with minimal difficulty. Patient with increased sensitivity to touch on tips of index and thumb; however, able to tolerate. Patient Education/HEP:   Place and holds        ASSESSMENT       Assessment:   Patient tolerated treatment session well. Patient's incisions are healing well with essentially no scabbing to index finger or thumb this date. Patient continues to demonstrates increased sensitivity, decreased ROM and strength, and decreased functional use of right hand.  Patient would benefit from continued occupational therapy to address these deficits and improve overall quality of life. Post Treatment Pain: Pt denies pain    Patient's Activity Tolerance: Patient tolerated treatment session well. GOALS         Long Term Goals  Time Frame for Long term goals : 2x/week for 4-6 weeks, 8-12 visits  Long Term Goal 1: Patient will tolerate touch to right hand index and thumb with reports of pain </=2/10. Long Term Goal 2: Patient will increase right hand index and thumb MP flexion by 20* to increase functional use of hand in daily activities. Long Term Goal 3: Patient will demonstrate [facility] right hand coordination as measured by a 10 second improvement on 9-HPT. Long Term Goal 4: Patient will increase right hand  strength by 15-20# to increase ease with I/ADLs. Long Term Goal 5: Patient will increase right hand pinch strength by 5# to increase functional use of hand in daily activities. Long Term Goal 6: Patient will be independent with all recommended HEP for pain and edema management, ROM, coordination, and strength. TREATMENT PLAN     Patient to continue progress towards current plan of care.  Next Visit: 9/1/2022    Patient due for a progress note next visit           Electronically signed by MICHELA Gutiérrez  on 8/29/2022 at 1:44 PM  POC NOTE

## 2022-08-30 ENCOUNTER — HOSPITAL ENCOUNTER (OUTPATIENT)
Dept: ORTHOPEDIC SURGERY | Age: 47
Discharge: HOME | End: 2022-09-01
Payer: COMMERCIAL

## 2022-08-30 ENCOUNTER — OFFICE VISIT (OUTPATIENT)
Dept: ORTHOPEDIC SURGERY | Age: 47
End: 2022-08-30
Payer: COMMERCIAL

## 2022-08-30 VITALS
BODY MASS INDEX: 21.5 KG/M2 | RESPIRATION RATE: 18 BRPM | HEIGHT: 67 IN | TEMPERATURE: 97 F | OXYGEN SATURATION: 95 % | WEIGHT: 137 LBS | HEART RATE: 68 BPM

## 2022-08-30 DIAGNOSIS — S68.011D AMPUTATION OF RIGHT THUMB, SUBSEQUENT ENCOUNTER: ICD-10-CM

## 2022-08-30 DIAGNOSIS — S68.110A AMPUTATION OF RIGHT INDEX FINGER: ICD-10-CM

## 2022-08-30 DIAGNOSIS — S68.011D AMPUTATION OF RIGHT THUMB, SUBSEQUENT ENCOUNTER: Primary | ICD-10-CM

## 2022-08-30 PROCEDURE — 73130 X-RAY EXAM OF HAND: CPT

## 2022-08-30 PROCEDURE — 73130 X-RAY EXAM OF HAND: CPT | Performed by: ORTHOPAEDIC SURGERY

## 2022-08-30 PROCEDURE — 99203 OFFICE O/P NEW LOW 30 MIN: CPT | Performed by: ORTHOPAEDIC SURGERY

## 2022-08-30 NOTE — PROGRESS NOTES
bjective:      Patient ID: Natasha Dacosta is a 49 y.o. male who presents today for:  Chief Complaint   Patient presents with    Establish Care     Patient is following up on right hand finger amputation. Patient states that 2 months ago when he was in [place], he was working with a table saw and he sustained an amputation to the thumb and to the right index finger.   He was evaluated and treated at the place  He has since returned to [place], and he is going through occupational therapy who are working for the continued discomfort he has in the thumb and index finger      HPI  Amputation of thumb with a table saw-2 months ago  Treated in [place]    Past Medical History:   Diagnosis Date    Depression     Hypertension      Past Surgical History:   Procedure Laterality Date    FRACTURE SURGERY Left     radius and ulna-- with mutliple skin grafts     Social History     Socioeconomic History    Marital status: Single     Spouse name: Not on file    Number of children: Not on file    Years of education: Not on file    Highest education level: Not on file   Occupational History    Not on file   Tobacco Use    Smoking status: Every Day     Packs/day: 0.50     Years: 35.00     Pack years: 17.50     Types: Cigarettes    Smokeless tobacco: Never   Vaping Use    Vaping Use: Never used   Substance and Sexual Activity    Alcohol use: Yes     Comment: 30-35 beers daily    Drug use: Yes     Types: Marijuana (Weed)     Comment: occasional marijuana use    Sexual activity: Yes     Partners: Male     Comment: single male partner   Other Topics Concern    Not on file   Social History Narrative    Not on file     Social Determinants of Health     Financial Resource Strain: Low Risk     Difficulty of Paying Living Expenses: Not hard at all   Food Insecurity: No Food Insecurity    Worried About Running Out of Food in the Last Year: Never true    Ran Out of Food in the Last Year: Never true   Transportation Needs: Not on file   Physical Activity: Sufficiently Active    Days of Exercise per Week: 5 days    Minutes of Exercise per Session: 120 min   Stress: Not on file   Social Connections: Not on file   Intimate Partner Violence: Not At Risk    Fear of Current or Ex-Partner: No    Emotionally Abused: No    Physically Abused: No    Sexually Abused: No   Housing Stability: Not on file     Family History   Problem Relation Age of Onset    Bipolar Disorder Sister     Bipolar Disorder Brother     Asthma Neg Hx     Cancer Neg Hx     Heart Attack Neg Hx     Heart Disease Neg Hx     High Blood Pressure Neg Hx     High Cholesterol Neg Hx     Stroke Neg Hx      Allergies   Allergen Reactions    Benadryl [Diphenhydramine]      Current Outpatient Medications on File Prior to Visit   Medication Sig Dispense Refill    gabapentin (NEURONTIN) 300 MG capsule PLEASE SEE ATTACHED FOR DETAILED DIRECTIONS      [employer] MELATONIN 3 MG TABS tablet TAKE 3 TABLETS BY MOUTH AT BEDTIME      CVS NICOTINE TRANSDERMAL SYS 14 MG/24HR APPLY 1 PATCH TO SKIN DAILY      thiamine 100 MG tablet TAKE 1 TABLET BY MOUTH TWICE A DAY      dilTIAZem (CARDIZEM CD) 120 MG extended release capsule Take 1 capsule by mouth daily 90 capsule 2    busPIRone (BUSPAR) 10 MG tablet Take 1 tablet by mouth 2 times daily as needed (anxiety) 60 tablet 3    OXcarbazepine (TRILEPTAL) 600 MG tablet       EPINEPHrine (EPIPEN 2-BI) 0.3 MG/0.3ML SOAJ injection Use as directed for allergic reaction 1 each 1    OXcarbazepine (TRILEPTAL) 300 MG tablet Take by mouth       No current facility-administered medications on file prior to visit. Controlled Substance Monitoring:    Acute and Chronic Pain Monitoring:   No flowsheet data found.       Review of Systems    Objective:   Pulse 68   Temp 97 °F (36.1 °C) (Temporal)   Resp 18   Ht 5' 7\" (1.702 m)   Wt 137 lb (62.1 kg)   SpO2 95%   BMI 21.46 kg/m²     Physical Exam:    Right hand -right thumb  Amputation to the proximal phalanx ,  is present at the level of the DIP joint of the right thumb  There is good soft tissue coverage present over the stump of the amputation  Small scab is present over the tip of the thumb  Is extremely sensitive to light touch and pressure in sections of the region of amputation, suggestive of regenerating nerve roots  Active flexion extension at the MCP joint is present  Capillary refill the tip of the thumb is satisfactory    Right index finger  Amputation present at the level of the PIP joint  Appears to be a transphalangeal amputation  Good soft tissue coverage is present over the stump  There is no evidence of any inflammation redness  Sensations, once again of the tip of the thumb caused him considerable discomfort  He is hypersensitive in certain areas  He is still able to use the index finger and the thumb pain impingement over    No signs of redness, inflammation in either the thumb or the index finger      Diagnostic Imaging:  XR HAND RIGHT (MIN 3 VIEWS)    Result Date: 8/30/2022  X-ray of the right hand AP, lateral, oblique views There shows an amputation through the neck of the proximal phalanx of the thumb Mild angulation is noticed at the level of the amputation No bony spiculation noted Limitation is seen through the DIP joint of the right index finger The proximal phalanx appears entirely satisfactory         Assessment:       Diagnosis Orders   1. Amputation of right thumb, subsequent encounter  XR HAND RIGHT (MIN 3 VIEWS)      2.  Amputation of right index finger              Plan:      The patient is undergoing occupational therapy and desensitization of the index finger and the thumb  He may continue with this  He smokes less than half packet of cigarettes a day, and this does not contribute to the healing and he understands this  He will follow-up with me again in approximately 2 to 3 months, if the discomfort continues to persist in his thumb and his index finger does not resolve with therapy      Orders Placed This Encounter Procedures    XR HAND RIGHT (MIN 3 VIEWS)     Standing Status:   Future     Number of Occurrences:   1     Standing Expiration Date:   8/30/2023     No orders of the defined types were placed in this encounter. No follow-ups on file.       Sis Jewell MD

## 2022-09-01 ENCOUNTER — HOSPITAL ENCOUNTER (OUTPATIENT)
Dept: OCCUPATIONAL THERAPY | Age: 47
Setting detail: THERAPIES SERIES
Discharge: HOME | End: 2022-09-01
Payer: COMMERCIAL

## 2022-09-01 PROCEDURE — 97140 MANUAL THERAPY 1/> REGIONS: CPT

## 2022-09-01 PROCEDURE — 97530 THERAPEUTIC ACTIVITIES: CPT

## 2022-09-01 NOTE — PROGRESS NOTES
[place] [address] [facility]  [address] [address]  Dept: [phone]  Dept Fax: [phone]  Loc: [phone]    Occupational Therapy: PROGRESS NOTE    Patient: Rashaun Leary (62 y.o. male)   Date: 2022   :  1975  MRN: [id number]  CSN: [id number]  Account #: [de-identified]   Insurance: Payor: Miguel Valentin / Plan: [address] [place] [address] / Product Type: *No Product type* /   Insurance ID: [id number] - ([facility]) Secondary Insurance (if applicable): [employer] *   Referring Physician: Lyssa Carlton DO     PCP: Abdulkadir Iniguez MD  Date of eval: 2022  Last POC date: 2022   Reporting period: 22-22    Visits to Date: Total # of Visits to Date: 8  Progress note:Progress Note Counter: -  Visits Approved: 61 (PT/OT/SLP combined; Secondary- Follow Medicare guidelines)    No Show:    Cancelled Appts:      Medical Diagnosis: 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, subsequent encounter [F34.370V] Amputation of finger without complication        Treating diagnosis: Increased right hand pain, Decreased right hand AROM, Decreased right hand strength and coordination     Assessment:    Goals Current/Discharge status  Met   Long Term Goal 1: Patient will tolerate touch to right hand index and thumb with reports of pain </=2/10. Patient continues to demonstrate sensitivity to touch along tip of index and thumb. Patient's pain ranges from 2-4/10 [] Met  [x] Partially Met  [] Not Met   Long Term Goal 2: Patient will increase right hand index and thumb MP flexion by 20* to increase functional use of hand in daily activities. Right Index MP flexion: 85 (Evaluation: 75)  Right Thumb MP flexion: 55 (Evaluation: 30) [] Met  [x] Partially Met  [] Not Met   Long Term Goal 3: Patient will demonstrate WFL right hand coordination as measured by a 10 second improvement on 9-HPT.  Right 9-HPT: 18.98 (Evaluation: 27.21)  Comments: Patient utilizes thumb and 3rd digit to complete [] Met  [x] Partially Met  [] Not Met   Long Term Goal 4: Patient will increase right hand  strength by 15-20# to increase ease with I/ADLs. Right : 75 (Evaluation: 77) [] Met  [x] Partially Met  [] Not Met   Long Term Goal 5: Patient will increase right hand pinch strength by 5# to increase functional use of hand in daily activities. Right Lateral pinch: 11.5 (Evaluation: 10)  Right 3-point pinch: 7.5 (Evaluation: 6) [] Met  [x] Partially Met  [] Not Met   Long Term Goal 6: Patient will be independent with all recommended HEP for pain and edema management, ROM, coordination, and strength. Ongoing- Patient is independent with good follow through with all recommended HEP. [] Met  [x] Partially Met  [] Not Met       Frequency/Duration: Time Frame for Long term goals : 2x/week for 4-6 weeks, 8-12 visits     Rehab Potential: [] Excellent [x] Good     [] Fair [] Poor      Patient Status: [x] Continue/Initate plan of Care   []  Discharge   []  Additional visits requested, please re-certify for additional visits    Signature: Electronically signed by MICHELA Blanca on 9/1/2022 at 4:63 PM.      I certify that the above Occupational Therapy Services are being furnished while the patient is under my care. I agree with the treatment plan and certify that this therapy is necessary. [de-identified] Signature:  ___________________________   Date:_______                                                                   Valerie Grewal, DO        Physician Comments: _______________________________________________    Please sign and return to Milana Fenton. Please fax to the location listed below. Chantelle Rodas for this referral!          If you have any questions or concerns, please don't hesitate to call.   Thank you for your referral!

## 2022-09-07 ENCOUNTER — OFFICE VISIT (OUTPATIENT)
Dept: CARDIOLOGY CLINIC | Age: 47
End: 2022-09-07
Payer: MEDICAID

## 2022-09-07 VITALS
HEIGHT: 67 IN | WEIGHT: 134 LBS | SYSTOLIC BLOOD PRESSURE: 118 MMHG | HEART RATE: 76 BPM | DIASTOLIC BLOOD PRESSURE: 62 MMHG | RESPIRATION RATE: 14 BRPM | BODY MASS INDEX: 21.03 KG/M2 | OXYGEN SATURATION: 98 %

## 2022-09-07 DIAGNOSIS — E87.1 HYPONATREMIA: ICD-10-CM

## 2022-09-07 DIAGNOSIS — I73.00 RAYNAUD'S DISEASE WITHOUT GANGRENE: Primary | ICD-10-CM

## 2022-09-07 DIAGNOSIS — F10.930 ALCOHOL WITHDRAWAL SYNDROME WITHOUT COMPLICATION (HCC): ICD-10-CM

## 2022-09-07 PROCEDURE — 99213 OFFICE O/P EST LOW 20 MIN: CPT | Performed by: INTERNAL MEDICINE

## 2022-09-07 ASSESSMENT — ENCOUNTER SYMPTOMS
APNEA: 0
VOMITING: 0
ABDOMINAL PAIN: 0
WHEEZING: 0
SHORTNESS OF BREATH: 0
COUGH: 0
NAUSEA: 0
COLOR CHANGE: 1
DIARRHEA: 0
RHINORRHEA: 0
CHEST TIGHTNESS: 0
CONSTIPATION: 0
EYE REDNESS: 0

## 2022-09-07 NOTE — PROGRESS NOTES
Chief Complaint   Patient presents with    6 Month Follow-Up     For PAD. Results     Of ECHO. Patient presents for initial medical evaluation. Patient is followed on a regular basis by Dr. Alta Martinez MD.     No prior cardiac history  Active smoker  Raynaud's disease  TTE 3/15/2022 EF 65% no major valvular disease  No prior echocardiograms, no prior nuclear stress test, no prior coronary angiograms  ====================  9/7/2022  Follow-up on Raynaud's disease  Echocardiogram 3/15/2022 EF 65%  Patient reports feeling well denies leg pain because weather is cold  Unfortunately patient had an accident where he lost his right thumb and right index finger    3/2/2022  Patient returns to clinic as follow-up for lower extremity new discoloration  Patient underwent arterial ultrasound of the lower extremities bilaterally on 12/1/2021 which was reported as no significant stenosis  Patient was switched from lisinopril to diltiazem. Reports that pain in LE initially started getting worse but then after a week it actually improved. Ulcers healed and there is no pain. States that he is still smoking, he is drinking coffee  States that he is ready to quit if he he is at risk of lower extremity ulcers     151/21  50-year-old male with history of Raynaud's disease and active smoker comes to the clinic for the first time for evaluation and management of peripheral arterial disease    Patient reports that for many years he has been complaining of blue discoloration of fingers and toes especially during winter.     Currently his toe started getting bluish a month ago and his heel a week ago    Denies chest pain shortness of breath he is very physically active at home, he mows his grass and never has cardiac issues    Patient Active Problem List   Diagnosis    Alcohol withdrawal syndrome without complication ([facility]    Bipolar I disorder ([facility]    History of cardiovascular disorder    Raynaud's disease Pneumothorax, traumatic    Acute pain due to trauma    Closed traumatic nondisplaced fracture of multiple ribs of right side with routine healing    Closed nondisplaced fracture of acromial process of right scapula with routine healing    Hyponatremia    Abrasion of right arm       Past Surgical History:   Procedure Laterality Date    FRACTURE SURGERY Left     radius and ulna-- with mutliple skin grafts       Social History     Socioeconomic History    Marital status: Single   Tobacco Use    Smoking status: Every Day     Packs/day: 0.50     Years: 35.00     Pack years: 17.50     Types: Cigarettes    Smokeless tobacco: Never   Vaping Use    Vaping Use: Never used   Substance and Sexual Activity    Alcohol use: Yes     Comment: 30-35 beers daily    Drug use: Yes     Types: Marijuana (Weed)     Comment: occasional marijuana use    Sexual activity: Yes     Partners: Male     Comment: single male partner     Social Determinants of Health     Financial Resource Strain: Low Risk     Difficulty of Paying Living Expenses: Not hard at all   Food Insecurity: No Food Insecurity    Worried About Running Out of Food in the Last Year: Never true    Ran Out of Food in the Last Year: Never true   Physical Activity: Sufficiently Active    Days of Exercise per Week: 5 days    Minutes of Exercise per Session: 120 min   Intimate Partner Violence: Not At Risk    Fear of Current or Ex-Partner: No    Emotionally Abused: No    Physically Abused: No    Sexually Abused: No       Family History   Problem Relation Age of Onset    Bipolar Disorder Sister     Bipolar Disorder Brother     Asthma Neg Hx     Cancer Neg Hx     Heart Attack Neg Hx     Heart Disease Neg Hx     High Blood Pressure Neg Hx     High Cholesterol Neg Hx     Stroke Neg Hx        Current Outpatient Medications   Medication Sig Dispense Refill    gabapentin (NEURONTIN) 300 MG capsule PLEASE SEE ATTACHED FOR DETAILED DIRECTIONS      [employer]S MELATONIN 3 MG TABS tablet TAKE 3 TABLETS BY MOUTH AT BEDTIME      CVS NICOTINE TRANSDERMAL SYS 14 MG/24HR APPLY 1 PATCH TO SKIN DAILY      thiamine 100 MG tablet TAKE 1 TABLET BY MOUTH TWICE A DAY      dilTIAZem (CARDIZEM CD) 120 MG extended release capsule Take 1 capsule by mouth daily 90 capsule 2    busPIRone (BUSPAR) 10 MG tablet Take 1 tablet by mouth 2 times daily as needed (anxiety) 60 tablet 3    OXcarbazepine (TRILEPTAL) 600 MG tablet       EPINEPHrine (EPIPEN 2-BI) 0.3 MG/0.3ML SOAJ injection Use as directed for allergic reaction 1 each 1    OXcarbazepine (TRILEPTAL) 300 MG tablet Take by mouth       No current facility-administered medications for this visit. Benadryl [diphenhydramine]    Review of Systems:  Review of Systems   Constitutional:  Negative for activity change, chills, diaphoresis and fever. HENT:  Negative for congestion, ear pain, nosebleeds and rhinorrhea. Eyes:  Negative for redness and visual disturbance. Respiratory:  Negative for apnea, cough, chest tightness, shortness of breath and wheezing. Cardiovascular:  Negative for chest pain, palpitations and leg swelling. Gastrointestinal:  Negative for abdominal pain, constipation, diarrhea, nausea and vomiting. Genitourinary:  Negative for difficulty urinating and dysuria. Musculoskeletal: Negative. Negative for joint swelling. Skin:  Positive for color change. Negative for rash and wound. Neurological:  Negative for dizziness, syncope, weakness, numbness and headaches. Psychiatric/Behavioral: Negative. VITALS:  Blood pressure 118/62, pulse 76, resp. rate 14, height 5' 7\" (1.702 m), weight 134 lb (60.8 kg), SpO2 98 %. Body mass index is 20.99 kg/m². Physical Examination:  Physical Exam  Vitals and nursing note reviewed. Constitutional:       Appearance: Normal appearance. HENT:      Head: Normocephalic and atraumatic. Mouth/Throat:      Mouth: Mucous membranes are moist.      Pharynx: Oropharynx is clear.    Eyes:      Extraocular Movements: Extraocular movements intact. Conjunctiva/sclera: Conjunctivae normal.      Pupils: Pupils are equal, round, and reactive to light. Cardiovascular:      Rate and Rhythm: Normal rate and regular rhythm. Pulses: Normal pulses. Heart sounds: Normal heart sounds. Pulmonary:      Effort: Pulmonary effort is normal.      Breath sounds: Normal breath sounds. Abdominal:      General: Abdomen is flat. Bowel sounds are normal.      Palpations: Abdomen is soft. Musculoskeletal:         General: Normal range of motion. Cervical back: Normal range of motion and neck supple. Skin:     General: Skin is warm. Neurological:      General: No focal deficit present. Mental Status: He is alert and oriented to person, place, and time. Mental status is at baseline. Psychiatric:         Mood and Affect: Mood normal.   His right big toe has fluid discoloration at the tip as well as right heel  2+ distal pulses bilaterally dorsalis pedis and posterior tibial      EKG: Normal sinus rhythm    No orders of the defined types were placed in this encounter. The ASCVD Risk score (Mali Julien., et al., 2013) failed to calculate for the following reasons: The valid HDL cholesterol range is 20 to 100 mg/dL     ASSESSMENT:     Diagnosis Orders   1. Raynaud's disease without gangrene        2. Alcohol withdrawal syndrome without complication ([facility]        3. Hyponatremia            PLAN:   Reyes disease  Recommend the patient to avoid cold as much as possible  Highly encourage patient to quit smoking, patient states that he is ready to quit  Cont diltiazem 120 mg daily. Patient has not started this medication, reports that he will take it during the winter  TTE 3/2022 EF 65%    Follow-up in 6 months    Recommended patient to eat diets that emphasize high intakes of vegetables, fruits, nuts, whole grains, lean vegetable or animal protein, and fish.  As per 2019 ACC/AHA guideline on primary prevention of CVD     Recommended patient to engage in at least 150 minutes per week of accumulated moderate-intensity physical activity or 75 minutes per week of vigorous-intensity physical activity as per 2019 ACC/AHA guideline on primary prevention of CVD

## 2022-09-08 ENCOUNTER — HOSPITAL ENCOUNTER (OUTPATIENT)
Dept: OCCUPATIONAL THERAPY | Age: 47
Setting detail: THERAPIES SERIES
Discharge: HOME | End: 2022-09-08
Payer: COMMERCIAL

## 2022-09-08 PROCEDURE — 97530 THERAPEUTIC ACTIVITIES: CPT

## 2022-09-08 NOTE — PROGRESS NOTES
[facility]      Occupational Therapy: Daily Note   Patient: Evin Santo (42 y.o. male)   Date: 1640  Plan of Care Certification Period:  22   :  1975  MRN: [id number]  CSN: [id number]   Insurance: Payor: Charles Mis / Plan: [address] [place] [address] / Product Type: *No Product type* /   Insurance ID: [id number] - ([place] [employer]) Secondary Insurance (if applicable): [employer] *   Referring Physician: Amor Cook DO     PCP: Amparo Bruno MD Visits to Date: Total # of Visits to Date: 9   Progress note:Progress Note Counter: -  Visits Approved: 61 (PT/OT/SLP combined; Secondary- Follow Medicare guidelines)    No Show:    Cancelled Appts:      Medical Diagnosis: 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, subsequent encounter [L20.711V] Amputation of finger without complication        Therapy Time    Time in 1100   Time out 1155   Total treatment minutes 55   Total time code minutes  55 Minutes        OT Therapeutic activities 55 minutes for 4 unit(s), CPT 56350       SUBJECTIVE EXAMINATION     Patient's date of birth confirmed: Yes     Pain Level:   3/10    Patient Comments:   Patient states that the volar surface of right forearm is aching today. Learning/Language barrier: No       HEP/Strategies/Orthosis Compliance: Patient verbally confirmed compliant with HEP's          Restrictions: Right index/thumb amputation           OBJECTIVE EXAMINATION         TREATMENT     Focus of treatment was on the following:   ROM, desensitization, strength, coordination     Therapist provides desensitization techniques this date via massage to right hand index and thumb. Patient tolerates well with improved sensitivity at the tip of index and thumb. Patient provided with PROM into right hand MP flexion- 1 set x 10 reps with 5-10 second holds. Patient tolerates WFL range well.      Patient was provided with soft tissue mobility and direct myofascial release techniques as follows:  - Transverse plane: right wrist   Patient instructed in wrist passive ROM stretches into extension with good tolerance. Patient to include in HEP. Patient engages in right hand fine motor coordination activity for increased dexterity throughout right hand thumb and index. Patient grasps various size buttons from tabletop and sorts by color and shape. Patient is able to use thumb and index pinch when buttons placed on towel; however, requires use of thumb and middle finger when on solid tabletop. Patient tolerates well with minimal drops noted. Patient places right hand into fluidotherapy x15 minutes while performing AROM and gripping while in heat. Patient reports decreased pain while performing exercises in the heat. Patient Education/HEP:   Continue recommended HEP/activities. ASSESSMENT       Assessment:   Patient tolerated treatment session well. Patient continues to demonstrates increased sensitivity, decreased ROM and strength, and decreased functional use of right hand. Patient would benefit from continued occupational therapy to address these deficits and improve overall quality of life. Post Treatment Pain: 0/10    Patient's Activity Tolerance:   Patient tolerated treatment well              GOALS         Long Term Goals  Time Frame for Long term goals : 2x/week for 4-6 weeks, 8-12 visits  Long Term Goal 1: Patient will tolerate touch to right hand index and thumb with reports of pain </=2/10. Long Term Goal 2: Patient will increase right hand index and thumb MP flexion by 20* to increase functional use of hand in daily activities. Long Term Goal 3: Patient will demonstrate [facility] right hand coordination as measured by a 10 second improvement on 9-HPT. Long Term Goal 4: Patient will increase right hand  strength by 15-20# to increase ease with I/ADLs.   Long Term Goal 5: Patient will increase right hand pinch strength by 5# to increase functional use of hand in daily activities. Long Term Goal 6: Patient will be independent with all recommended HEP for pain and edema management, ROM, coordination, and strength. TREATMENT PLAN     Patient to continue progress towards current plan of care.  Next Visit: 9/15/2022    Patient cancels appointment on Monday 4/68 due to conflicting appointment           Electronically signed by MICHELA Zhu  on 9/8/2022 at 11:57 AM

## 2022-09-12 ENCOUNTER — APPOINTMENT (OUTPATIENT)
Dept: OCCUPATIONAL THERAPY | Age: 47
End: 2022-09-12
Payer: COMMERCIAL

## 2022-09-12 ENCOUNTER — ANESTHESIA EVENT (OUTPATIENT)
Dept: ENDOSCOPY | Age: 47
End: 2022-09-12
Payer: COMMERCIAL

## 2022-09-12 ENCOUNTER — HOSPITAL ENCOUNTER (OUTPATIENT)
Age: 47
Setting detail: OUTPATIENT SURGERY
Discharge: HOME | End: 2022-09-12
Attending: SPECIALIST | Admitting: SPECIALIST
Payer: COMMERCIAL

## 2022-09-12 ENCOUNTER — ANESTHESIA (OUTPATIENT)
Dept: ENDOSCOPY | Age: 47
End: 2022-09-12
Payer: COMMERCIAL

## 2022-09-12 VITALS
OXYGEN SATURATION: 100 % | WEIGHT: 134 LBS | HEIGHT: 67 IN | SYSTOLIC BLOOD PRESSURE: 102 MMHG | TEMPERATURE: 97.7 F | DIASTOLIC BLOOD PRESSURE: 69 MMHG | BODY MASS INDEX: 21.03 KG/M2 | HEART RATE: 71 BPM | RESPIRATION RATE: 16 BRPM

## 2022-09-12 DIAGNOSIS — Z12.11 COLON CANCER SCREENING: ICD-10-CM

## 2022-09-12 PROBLEM — D12.5 ADENOMATOUS POLYP OF SIGMOID COLON: Status: ACTIVE | Noted: 2022-09-12

## 2022-09-12 PROBLEM — D12.6 ADENOMATOUS POLYP OF COLON: Status: ACTIVE | Noted: 2022-09-12

## 2022-09-12 PROCEDURE — 2500000003 HC RX 250 WO HCPCS: Performed by: NURSE ANESTHETIST, CERTIFIED REGISTERED

## 2022-09-12 PROCEDURE — 3700000000 HC ANESTHESIA ATTENDED CARE: Performed by: SPECIALIST

## 2022-09-12 PROCEDURE — 7100000011 HC PHASE II RECOVERY - ADDTL 15 MIN: Performed by: SPECIALIST

## 2022-09-12 PROCEDURE — 2580000003 HC RX 258

## 2022-09-12 PROCEDURE — 45380 COLONOSCOPY AND BIOPSY: CPT | Performed by: SPECIALIST

## 2022-09-12 PROCEDURE — 6370000000 HC RX 637 (ALT 250 FOR IP): Performed by: SPECIALIST

## 2022-09-12 PROCEDURE — 45385 COLONOSCOPY W/LESION REMOVAL: CPT | Performed by: SPECIALIST

## 2022-09-12 PROCEDURE — 2580000003 HC RX 258: Performed by: SPECIALIST

## 2022-09-12 PROCEDURE — 3700000001 HC ADD 15 MINUTES (ANESTHESIA): Performed by: SPECIALIST

## 2022-09-12 PROCEDURE — 88305 TISSUE EXAM BY PATHOLOGIST: CPT

## 2022-09-12 PROCEDURE — 7100000010 HC PHASE II RECOVERY - FIRST 15 MIN: Performed by: SPECIALIST

## 2022-09-12 PROCEDURE — 3609027000 HC COLONOSCOPY: Performed by: SPECIALIST

## 2022-09-12 PROCEDURE — 6360000002 HC RX W HCPCS: Performed by: NURSE ANESTHETIST, CERTIFIED REGISTERED

## 2022-09-12 PROCEDURE — 2709999900 HC NON-CHARGEABLE SUPPLY: Performed by: SPECIALIST

## 2022-09-12 RX ORDER — MAGNESIUM HYDROXIDE 1200 MG/15ML
LIQUID ORAL PRN
Status: DISCONTINUED | OUTPATIENT
Start: 2022-09-12 | End: 2022-09-12 | Stop reason: ALTCHOICE

## 2022-09-12 RX ORDER — LIDOCAINE HYDROCHLORIDE 20 MG/ML
INJECTION, SOLUTION INFILTRATION; PERINEURAL PRN
Status: DISCONTINUED | OUTPATIENT
Start: 2022-09-12 | End: 2022-09-12 | Stop reason: SDUPTHER

## 2022-09-12 RX ORDER — SODIUM CHLORIDE 9 MG/ML
INJECTION, SOLUTION INTRAVENOUS
Status: DISCONTINUED
Start: 2022-09-12 | End: 2022-09-12 | Stop reason: HOSPADM

## 2022-09-12 RX ORDER — SODIUM CHLORIDE 9 MG/ML
INJECTION, SOLUTION INTRAVENOUS
Status: COMPLETED
Start: 2022-09-12 | End: 2022-09-12

## 2022-09-12 RX ORDER — PROPOFOL 10 MG/ML
INJECTION, EMULSION INTRAVENOUS PRN
Status: DISCONTINUED | OUTPATIENT
Start: 2022-09-12 | End: 2022-09-12 | Stop reason: SDUPTHER

## 2022-09-12 RX ORDER — SIMETHICONE 20 MG/.3ML
EMULSION ORAL PRN
Status: DISCONTINUED | OUTPATIENT
Start: 2022-09-12 | End: 2022-09-12 | Stop reason: ALTCHOICE

## 2022-09-12 RX ORDER — SODIUM CHLORIDE 9 MG/ML
INJECTION, SOLUTION INTRAVENOUS CONTINUOUS
Status: DISCONTINUED | OUTPATIENT
Start: 2022-09-12 | End: 2022-09-12 | Stop reason: HOSPADM

## 2022-09-12 RX ADMIN — SODIUM CHLORIDE: 900 INJECTION, SOLUTION INTRAVENOUS at 10:34

## 2022-09-12 RX ADMIN — PROPOFOL 50 MG: 10 INJECTION, EMULSION INTRAVENOUS at 11:56

## 2022-09-12 RX ADMIN — SODIUM CHLORIDE: 900 INJECTION, SOLUTION INTRAVENOUS at 12:19

## 2022-09-12 RX ADMIN — PROPOFOL 50 MG: 10 INJECTION, EMULSION INTRAVENOUS at 11:54

## 2022-09-12 RX ADMIN — LIDOCAINE HYDROCHLORIDE 40 MG: 20 INJECTION, SOLUTION INFILTRATION; PERINEURAL at 11:50

## 2022-09-12 RX ADMIN — PROPOFOL 50 MG: 10 INJECTION, EMULSION INTRAVENOUS at 12:02

## 2022-09-12 RX ADMIN — PROPOFOL 150 MG: 10 INJECTION, EMULSION INTRAVENOUS at 11:50

## 2022-09-12 ASSESSMENT — LIFESTYLE VARIABLES: SMOKING_STATUS: 1

## 2022-09-12 ASSESSMENT — PAIN - FUNCTIONAL ASSESSMENT: PAIN_FUNCTIONAL_ASSESSMENT: 0-10

## 2022-09-12 NOTE — ANESTHESIA POSTPROCEDURE EVALUATION
Department of Anesthesiology  Postprocedure Note    Patient: Harriett Faulkner  MRN: [id number]  YOB: 1975  Date of evaluation: 9/12/2022      Procedure Summary     Date: 09/12/22 Room / Location: [facility] OR [address] [place] [address]    Anesthesia Start: 1148 Anesthesia Stop: 1211    Procedure: COLORECTAL CANCER SCREENING, w/polypectomies Diagnosis:       Colon cancer screening      (Colon cancer screening [Z12.11])    Surgeons: Daily Alfonso MD Responsible Provider: ANNETTE Uribe CRNA    Anesthesia Type: MAC ASA Status: 2          Anesthesia Type: No value filed.     Roma Phase I:      Roma Phase II:        Anesthesia Post Evaluation    Patient location during evaluation: PACU  Patient participation: waiting for patient participation  Level of consciousness: sleepy but conscious  Pain score: 1  Airway patency: patent  Nausea & Vomiting: no nausea and no vomiting  Complications: no  Cardiovascular status: hemodynamically stable  Respiratory status: acceptable and nasal cannula  Hydration status: euvolemic  Comments: Report to RN, normal sinus rhythm

## 2022-09-12 NOTE — DISCHARGE INSTRUCTIONS
Lower Discharge Instructions    Patient Name: Rosette Barron  Patient ID: [id number]  YOB: 1975  Procedure: Rey Frederick  Referring Physician: [unfilled]  Procedure Date: 9/12/2022    Recommendations:  []   Follow-up appointment with family physician in 4 weeks. []   Colonoscopy recommended in 5 years. []   Follow-up appointment with endoscopist in  Reports of your procedure and these recommendations have been sent to:  [unfilled]    Sedative medication given for procedures can slow your reaction time and coordination for many hours. If you receive medications, it is important for your safety to follow the instructions below for the remainder of the day:  BE TAKEN directly home from the center and rest quietly. DO NOT resume normal activities until tomorrow. Do NOT drive, return to work, or operate any machinery or power tools. Do NOT make any important personal or business decisions, sign any legal papers or perform any activity that depends on your full concentration power or mental judgement. Do NOT drink any alcohol or take nerve or sleeping drugs. They add to the effects of the medicine still present in your body.    [] (Checked if a biopsy or polyp removal was performed)  There is a slight risk of bleeding. If you had a biopsy or a polyp removed, we suggest that you follow the instructions below:  Do NOT take aspirin or similar anti-inflammatory medicine for ___ days. Do NOT exercise, jog, or do any heavy lifting or straining for 1 day. Potential common after effects and treatment following the procedure:  Mild abdominal pain, bloating, or excessive gas - rest, eat lightly, and use a heating pad. Redness and/or swelling where the IV was - apply heat and elevate. Symptoms to report to your physician:  Severe abdominal pain or bloating. Chills or fever above 101 degrees occurring within 24 hours after procedure. Large amounts of rectal bleeding that does not stop.  Small amount of rectal bleeding is not serious especially if hemorrhoids are present. Unable to keep down food or drink. IV site stays red and swollen for more than 2 days. In the case of an emergency, please go to the emergency room. If you are not having an emergency but are having some of the above symptoms please call the doctor's office at:  Dr. Tierra Brwon [phone]   @[email or web address]@      I have read and understand the above instructions:            ____________________________         ____________________________  Patient or Patient Rep. Signature   Witness Signature      Date: 9/12/2022  Time:

## 2022-09-12 NOTE — ANESTHESIA PRE PROCEDURE
Department of Anesthesiology  Preprocedure Note       Name:  Terra Lopez   Age:  49 y.o.  :  1975                                          MRN:  [id number]         Date:  2022      Surgeon: Lonna Frankel):  Kenzie Cifuentes MD    Procedure: Procedure(s):  COLORECTAL CANCER SCREENING, NOT HIGH RISK    Medications prior to admission:   Prior to Admission medications    Medication Sig Start Date End Date Taking? Authorizing Provider   gabapentin (NEURONTIN) 300 MG capsule PLEASE SEE ATTACHED FOR DETAILED DIRECTIONS 22   Historical Provider, MD   [employer] MELATONIN 3 MG TABS tablet TAKE 3 TABLETS BY MOUTH AT BEDTIME 22   Historical Provider, MD   [facility]S NICOTINE TRANSDERMAL SYS 14 MG/24HR APPLY 1 PATCH TO SKIN DAILY 22   Historical Provider, MD   thiamine 100 MG tablet TAKE 1 TABLET BY MOUTH TWICE A DAY  Patient not taking: Reported on 2022   Historical Provider, MD   dilTIAZem (CARDIZEM CD) 120 MG extended release capsule Take 1 capsule by mouth daily  Patient not taking: Reported on 2022 3/7/22   Roldan Valerio MD   busPIRone (BUSPAR) 10 MG tablet Take 1 tablet by mouth 2 times daily as needed (anxiety) 22   Salima Rojas MD   OXcarbazepine (TRILEPTAL) 600 MG tablet  21   Historical Provider, MD   EPINEPHrine (EPIPEN 2-BI) 0.3 MG/0.3ML SOAJ injection Use as directed for allergic reaction 21   Darell Vasquez, APRN - CNP   OXcarbazepine (TRILEPTAL) 300 MG tablet Take by mouth    Historical Provider, MD       Current medications:    Current Facility-Administered Medications   Medication Dose Route Frequency Provider Last Rate Last Admin    sodium chloride 0.9 % infusion                Allergies:     Allergies   Allergen Reactions    Benadryl [Diphenhydramine]        Problem List:    Patient Active Problem List   Diagnosis Code    Alcohol withdrawal syndrome without complication ([address] P70.801    Bipolar I disorder ([email or web address] F31.9    History of cardiovascular disorder Z86.79  Raynaud's disease I73.00    Pneumothorax, traumatic S27. 0XXA    Acute pain due to trauma G89.11    Closed traumatic nondisplaced fracture of multiple ribs of right side with routine healing S22.41XD    Closed nondisplaced fracture of acromial process of right scapula with routine healing S42.124D    Hyponatremia E87.1    Abrasion of right arm S40.811A       Past Medical History:        Diagnosis Date    Depression     Hypertension        Past Surgical History:        Procedure Laterality Date    FINGER SURGERY Right     Right Thumb and second digit S/P traumatic amputation distal phlanges    FRACTURE SURGERY Left     radius and ulna-- with mutliple skin grafts       Social History:    Social History     Tobacco Use    Smoking status: Every Day     Packs/day: 0.50     Years: 35.00     Pack years: 17.50     Types: Cigarettes    Smokeless tobacco: Never   Substance Use Topics    Alcohol use: Yes     Alcohol/week: 1.0 standard drink     Types: 1 Cans of beer per week     Comment: occasional                                Ready to quit: Yes  Counseling given: Yes      Vital Signs (Current):   Vitals:    09/12/22 1019   BP: (!) 128/91   Pulse: 78   Resp: 16   Temp: 36.5 °C (97.7 °F)   TempSrc: Temporal   SpO2: 100%   Weight: 134 lb (60.8 kg)   Height: 5' 7\" (1.702 m)                                              BP Readings from Last 3 Encounters:   09/12/22 (!) 128/91   09/07/22 118/62   07/25/22 122/84       NPO Status: Time of last liquid consumption: 0500                        Time of last solid consumption: 1900                        Date of last liquid consumption: 09/12/22                        Date of last solid food consumption: 09/10/22    BMI:   Wt Readings from Last 3 Encounters:   09/12/22 134 lb (60.8 kg)   09/07/22 134 lb (60.8 kg)   08/30/22 137 lb (62.1 kg)     Body mass index is 20.99 kg/m².     CBC:   Lab Results   Component Value Date/Time    WBC 4.1 08/28/2021 02:45 PM    RBC 4.16 08/28/2021 02:45 PM    HGB 14.7 08/28/2021 02:45 PM    HCT 41.5 08/28/2021 02:45 PM    MCV 99.6 08/28/2021 02:45 PM    RDW 13.1 08/28/2021 02:45 PM     08/28/2021 02:45 PM       CMP:   Lab Results   Component Value Date/Time     02/02/2022 12:08 PM    K 4.0 02/02/2022 12:08 PM    K 3.7 11/26/2019 06:27 AM    CL 90 02/02/2022 12:08 PM    CO2 24 02/02/2022 12:08 PM    BUN 3 02/02/2022 12:08 PM    CREATININE 0.47 02/02/2022 12:08 PM    GFRAA >60.0 02/02/2022 12:08 PM    LABGLOM >60.0 02/02/2022 12:08 PM    GLUCOSE 84 02/02/2022 12:08 PM    PROT 7.5 02/02/2022 12:08 PM    CALCIUM 8.9 02/02/2022 12:08 PM    BILITOT 0.3 02/02/2022 12:08 PM    ALKPHOS 93 02/02/2022 12:08 PM    AST 73 02/02/2022 12:08 PM    ALT 50 02/02/2022 12:08 PM       POC Tests: No results for input(s): POCGLU, POCNA, POCK, POCCL, POCBUN, POCHEMO, POCHCT in the last 72 hours. Coags:   Lab Results   Component Value Date/Time    PROTIME 13.2 11/24/2019 08:49 AM    INR 1.0 11/24/2019 08:49 AM       HCG (If Applicable): No results found for: PREGTESTUR, PREGSERUM, HCG, HCGQUANT     ABGs: No results found for: PHART, PO2ART, NVZ1PMS, FYO6AKM, BEART, T7KSMZRW     Type & Screen (If Applicable):  No results found for: LABABO, LABRH    Drug/Infectious Status (If Applicable):  No results found for: HIV, HEPCAB    COVID-19 Screening (If Applicable): No results found for: COVID19        Anesthesia Evaluation  Patient summary reviewed and Nursing notes reviewed  Airway: Mallampati: II  TM distance: >3 FB   Neck ROM: full  Mouth opening: > = 3 FB   Dental:    (+) other      Pulmonary: breath sounds clear to auscultation  (+) current smoker          Patient did not smoke on day of surgery.                  Cardiovascular:  Exercise tolerance: no interval change,   (+) hypertension:,       NYHA Classification: II    Rhythm: regular  Rate: normal           Beta Blocker:  Not on Beta Blocker         Neuro/Psych:   (+) psychiatric history:depression/anxiety              ROS comment: ETOH GI/Hepatic/Renal:   (+) bowel prep,           Endo/Other: Negative Endo/Other ROS                    Abdominal:       Abdomen: soft. Vascular: negative vascular ROS. Other Findings:           Anesthesia Plan      MAC     ASA 2       Induction: intravenous. continuous noninvasive hemodynamic monitor    Anesthetic plan and risks discussed with patient. Plan discussed with attending.                     ANNETTE Cruz - KEO   9/12/2022

## 2022-09-12 NOTE — H&P
Patient Name: Richelle Munguia  : 1975  MRN: [id number]  DATE: 22      ENDOSCOPY  History and Physical    Procedure:    [] Diagnostic Colonoscopy       [x] Screening Colonoscopy  [] EGD      [] ERCP      [] EUS       [] Other    [x] Previous office notes/History and Physical reviewed from the patients chart. Please see EMR for further details of HPI. I have examined the patient's status immediately prior to the procedure and:      Indications/HPI:    []Abdominal Pain  []Cancer- GI/Lung  []Fhx of colon CA/polyps  []History of Polyps  []Ruths   []Melena  []Abnormal Imaging  []Dysphagia    []Persistent Pneumonia  []Anemia  []Food Impaction  []History of Polyps  []GI Bleed  []Pulmonary nodule/Mass  []Change in bowel habits []Heartburn/Reflux  []Rectal Bleed (BRBPR)  []Chest Pain - Non Cardiac []Heme (+) Stoo  l[]Ulcers  []Constipation  []Hemoptysis   []Varices  []Diarrhea  []Hypoxemia  []Nausea/Vomiting  []Screening   []Crohns/Colitis  []Other:     Anesthesia:   [x] MAC [] Moderate Sedation   [] General   [] None     ROS: 12 pt Review of Symptoms was negative unless mentioned above    Medications:   Prior to Admission medications    Medication Sig Start Date End Date Taking?  Authorizing Provider   gabapentin (NEURONTIN) 300 MG capsule PLEASE SEE ATTACHED FOR DETAILED DIRECTIONS 22   Historical Provider, MD   [employer] MELATONIN 3 MG TABS tablet TAKE 3 TABLETS BY MOUTH AT BEDTIME 22   Historical Provider, MD   [facility] NICOTINE TRANSDERMAL SYS 14 MG/24HR APPLY 1 PATCH TO SKIN DAILY 22   Historical Provider, MD   thiamine 100 MG tablet TAKE 1 TABLET BY MOUTH TWICE A DAY  Patient not taking: Reported on 2022   Historical Provider, MD   dilTIAZem (CARDIZEM CD) 120 MG extended release capsule Take 1 capsule by mouth daily  Patient not taking: Reported on 2022 3/7/22   Shmuel Mcleod MD   busPIRone (BUSPAR) 10 MG tablet Take 1 tablet by mouth 2 times daily as needed (anxiety) 22   Carmen Roe MD Patience   OXcarbazepine (TRILEPTAL) 600 MG tablet  11/12/21   Historical Provider, MD   EPINEPHrine (EPIPEN 2-BI) 0.3 MG/0.3ML SOAJ injection Use as directed for allergic reaction 8/29/21   ANNETTE Gama CNP   OXcarbazepine (TRILEPTAL) 300 MG tablet Take by mouth    Historical Provider, MD       Allergies: Allergies   Allergen Reactions    Benadryl [Diphenhydramine]         History of allergic reaction to anesthesia:  No    Past Medical History:  Past Medical History:   Diagnosis Date    Depression     Hypertension        Past Surgical History:  Past Surgical History:   Procedure Laterality Date    FINGER SURGERY Right     Right Thumb and second digit S/P traumatic amputation distal phlanges    FRACTURE SURGERY Left     radius and ulna-- with mutliple skin grafts       Social History:  Social History     Tobacco Use    Smoking status: Every Day     Packs/day: 0.50     Years: 35.00     Pack years: 17.50     Types: Cigarettes    Smokeless tobacco: Never   Vaping Use    Vaping Use: Never used   Substance Use Topics    Alcohol use: Yes     Alcohol/week: 1.0 standard drink     Types: 1 Cans of beer per week     Comment: occasional    Drug use: Yes     Types: Marijuana Rebeca Angel)     Comment: occasional marijuana use       Vital Signs:   Vitals:    09/12/22 1019   BP: (!) 128/91   Pulse: 78   Resp: 16   Temp: 97.7 °F (36.5 °C)   SpO2: 100%        Physical Exam:  Cardiac:  [x]WNL  []Comments:  Pulmonary:  [x]WNL   []Comments:   Neuro/Mental Status:  [x]WNL  []Comments:  Abdominal:  [x]WNL    []Comments:  Other:   []WNL  []Comments:    Informed Consent:  The risks and benefits of the procedure have been discussed with either the patient or if they cannot consent, their representative. Assessment:  Patient examined and appropriate for planned sedation and procedure. Plan:  Proceed with planned sedation and procedure as above.     Asa Ormond, MD  11:45 AM

## 2022-09-15 ENCOUNTER — HOSPITAL ENCOUNTER (OUTPATIENT)
Dept: OCCUPATIONAL THERAPY | Age: 47
Setting detail: THERAPIES SERIES
Discharge: HOME | End: 2022-09-15
Payer: COMMERCIAL

## 2022-09-15 PROCEDURE — 97140 MANUAL THERAPY 1/> REGIONS: CPT

## 2022-09-15 PROCEDURE — 97530 THERAPEUTIC ACTIVITIES: CPT

## 2022-09-15 NOTE — PROGRESS NOTES
[facility]      Occupational Therapy: Daily Note   Patient: Paco Breen (89 y.o. male)   Date:   Plan of Care Certification Period:  22   :  1975  MRN: [id number]  CSN: [id number]   Insurance: Payor: Tammy Alberto 150 / Plan: [address] [place] [address] / Product Type: *No Product type* /   Insurance ID: [id number] - ([place] [employer]) Secondary Insurance (if applicable): Marita Patches *   Referring Physician: Kimberley Tubbs DO     PCP: Radha Batres MD Visits to Date: Total # of Visits to Date: 10   Progress note:Progress Note Counter: -  Visits Approved: 61 (PT/OT/SLP combined; Secondary- follow Medicare guidelines)    No Show:    Cancelled Appts:      Medical Diagnosis: 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, subsequent encounter [S68.119D] Amputation of finger without complication        Therapy Time    Time in 0900   Time out 0957   Total treatment minutes 57   Total time code minutes  57 Minutes        OT Manual therapy 25 minutes for 2 unit(s), CPT 22853  OT Therapeutic activities 32 minutes for 2 unit(s), CPT 20769       SUBJECTIVE EXAMINATION     Patient's date of birth confirmed: Yes     Pain Level:   2-3/10    Patient Comments:   Patient states that the pain in his wrist has subsided. Patient identifies an area of redness on tip of index with increased sensitivity over the weekend; however, tolerable this date. Learning/Language barrier: No       HEP/Strategies/Orthosis Compliance: Patient verbally confirmed compliant with HEP's          Restrictions: None reported           OBJECTIVE EXAMINATION         TREATMENT     Focus of treatment was on the following:   ROM, desensitization, strength, coordination     Therapist provides desensitization techniques this date via massage to right hand index and thumb. Patient tolerates well with improved sensitivity at the tip of index and thumb.     Patient was provided with soft tissue mobility and direct myofascial release techniques as follows:  - Transverse plane: right proximal phalange of index  - Cross-hand release: right proximal phalange of index  - Finger pull to right index     Patient provided with PROM into right hand MP flexion- 1 set x 10 reps with 5-10 second holds. Patient tolerates WFL range well. Patient engages in right hand strengthening activity with resistive clips. Patient able to utilize right hand index and thumb to pinch and place all resistances (1-8#) onto horizontal targets. Patient occasionally requires the use of middle finger for assistance with 6-8# clips. Patient then removes all clips and returns to storage with good tolerance. Patient places right hand into fluidotherapy x20 minutes while performing AROM and gripping while in heat. Patient reports decreased pain while performing exercises in the heat. Patient Education/HEP:   Continue recommended HEP/activities. ASSESSMENT       Assessment:   Patient tolerated treatment session well. Patient continues to demonstrates increased sensitivity, decreased ROM and strength, and decreased functional use of right hand. Patient would benefit from continued occupational therapy to address these deficits and improve overall quality of life. Post Treatment Pain: 0/10    Patient's Activity Tolerance:   Patient tolerated treatment well              GOALS         Long Term Goals  Time Frame for Long term goals : 2x/week for 4-6 weeks, 8-12 visits  Long Term Goal 1: Patient will tolerate touch to right hand index and thumb with reports of pain </=2/10. Long Term Goal 2: Patient will increase right hand index and thumb MP flexion by 20* to increase functional use of hand in daily activities. Long Term Goal 3: Patient will demonstrate [facility] right hand coordination as measured by a 10 second improvement on 9-HPT.   Long Term Goal 4: Patient will increase right hand  strength by 15-20# to increase ease with I/ADLs. Long Term Goal 5: Patient will increase right hand pinch strength by 5# to increase functional use of hand in daily activities. Long Term Goal 6: Patient will be independent with all recommended HEP for pain and edema management, ROM, coordination, and strength. TREATMENT PLAN     Patient to continue progress towards current plan of care.  Next Visit: 9/19/2022           Electronically signed by MICHELA Blanca  on 9/15/2022 at 9:59 AM

## 2022-09-19 ENCOUNTER — HOSPITAL ENCOUNTER (OUTPATIENT)
Dept: OCCUPATIONAL THERAPY | Age: 47
Setting detail: THERAPIES SERIES
Discharge: HOME | End: 2022-09-19
Payer: COMMERCIAL

## 2022-09-19 PROCEDURE — 97530 THERAPEUTIC ACTIVITIES: CPT

## 2022-09-19 PROCEDURE — 97140 MANUAL THERAPY 1/> REGIONS: CPT

## 2022-09-19 NOTE — PROGRESS NOTES
[facility]      Occupational Therapy: Daily Note   Patient: Thea Hill (27 y.o. male)   Date:   Plan of Care Certification Period:  22   :  1975  MRN: [id number]  CSN: [id number]   Insurance: Payor: Homer Whitehead / Plan: [address] [place] [address] / Product Type: *No Product type* /   Insurance ID: [id number] - ([facility]) Secondary Insurance (if applicable): Kindra Art *   Referring Physician: Valerie Grewal DO     PCP: Shelley Bravo MD Visits to Date: Total # of Visits to Date: 11   Progress note:Progress Note Counter: 10/8-  Visits Approved: 61 (PT/OT/SLP combined; Secondary- Follow Medicare guidelines)    No Show:    Cancelled Appts:      Medical Diagnosis: 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, subsequent encounter [S68.880D] Amputation of finger without complication        Therapy Time    Time in 0900   Time out 0958   Total treatment minutes 58   Total time code minutes  58 Minutes        OT Manual therapy 20 minutes for 1 unit(s), CPT 30892  OT Therapeutic activities 38 minutes for 3 unit(s), CPT 16115       SUBJECTIVE EXAMINATION     Patient's date of birth confirmed: Yes     Pain Level:   1-2/10    Patient Comments:   \"I'm getting my pinch back, and it's feeling stronger! \"         Learning/Language barrier: No       HEP/Strategies/Orthosis Compliance: Patient verbally confirmed compliant with HEP's          Restrictions: None reported           OBJECTIVE EXAMINATION         TREATMENT     Focus of treatment was on the following:   ROM, desensitization, strength, coordination     Therapist provides desensitization techniques this date via massage to right hand index and thumb. Patient tolerates well with improved sensitivity at the tip of index and thumb.      Patient was provided with soft tissue mobility and direct myofascial release techniques as follows:  - Transverse plane: right proximal phalange of index  - Cross-hand release: right proximal phalange of index  - Finger pull to right index     Patient provided with PROM into right hand MP flexion- 1 set x 10 reps with 5-10 second holds. Patient tolerates WFL range well. Patient engages in right hand strengthening activity with resistive clips. Patient utilizes 4# resistive clip to balance 25 small pop beads onto form board. Patient then attempts to assemble a pop bead chain; however, unable to maintain pinch on beads. Patient removes beads using 4# resistive clip and returns to storage. Patient places right hand into fluidotherapy x20 minutes while performing AROM and gripping while in heat. Patient reports decreased pain while performing exercises in the heat. Patient Education/HEP:   Continue recommended HEP/activities. ASSESSMENT       Assessment:   Patient tolerated treatment session well. Patient continues to demonstrates increased sensitivity, decreased end ROM of index and /pinch strength; however, improving functional use of right hand. Patient would benefit from continued occupational therapy to address these deficits and improve overall quality of life. Post Treatment Pain: 0-1/10    Patient's Activity Tolerance:   Patient tolerated treatment well              GOALS         Long Term Goals  Time Frame for Long term goals : 2x/week for 4-6 weeks, 8-12 visits  Long Term Goal 1: Patient will tolerate touch to right hand index and thumb with reports of pain </=2/10. Long Term Goal 2: Patient will increase right hand index and thumb MP flexion by 20* to increase functional use of hand in daily activities. Long Term Goal 3: Patient will demonstrate [facility] right hand coordination as measured by a 10 second improvement on 9-HPT. Long Term Goal 4: Patient will increase right hand  strength by 15-20# to increase ease with I/ADLs.   Long Term Goal 5: Patient will increase right hand pinch strength by 5# to increase functional use of hand in daily activities. Long Term Goal 6: Patient will be independent with all recommended HEP for pain and edema management, ROM, coordination, and strength. TREATMENT PLAN     Patient to continue progress towards current plan of care.  Next Visit: 9/22/2022           Electronically signed by MICHELA Angel  on 9/19/2022 at 10:52 AM

## 2022-09-22 ENCOUNTER — HOSPITAL ENCOUNTER (OUTPATIENT)
Dept: OCCUPATIONAL THERAPY | Age: 47
Setting detail: THERAPIES SERIES
Discharge: HOME | End: 2022-09-22
Payer: COMMERCIAL

## 2022-09-22 PROCEDURE — 97140 MANUAL THERAPY 1/> REGIONS: CPT

## 2022-09-22 PROCEDURE — 97530 THERAPEUTIC ACTIVITIES: CPT

## 2022-09-22 NOTE — PROGRESS NOTES
[facility]      Occupational Therapy: Daily Note   Patient: Al Anderson (16 y.o. male)   Date: 21/15/8772  Plan of Care Certification Period:  22   :  1975  MRN: [id number]  CSN: [id number]   Insurance: Payor: [facility] / Plan: [address] [place] [address] / Product Type: *No Product type* /   Insurance ID: [id number] - (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, subsequent encounter [X88.303D] Amputation of finger without complication        Therapy Time    Time in 0900   Time out 1000   Total treatment minutes 60   Total time code minutes  60 Minutes        OT Manual therapy 20 minutes for 1 unit(s), CPT 97413  OT Therapeutic activities 40 minutes for 3 unit(s), CPT 88867       SUBJECTIVE EXAMINATION     Patient's date of birth confirmed: Yes     Pain Level:   Numbness, no pain    Patient Comments: \"My fingers turn purple in the cold weather\"         Learning/Language barrier: No       HEP/Strategies/Orthosis Compliance: Patient verbally confirmed compliant with HEP's          Restrictions: None reported           OBJECTIVE EXAMINATION         TREATMENT     Focus of treatment was on the following:   ROM, desensitization, strength, coordination     Therapist provides desensitization techniques this date via massage to right hand index and thumb. Patient tolerates well with improved sensitivity at the tip of index and thumb.      Patient was provided with soft tissue mobility and direct myofascial release techniques as follows:  - Transverse plane: right proximal phalange of index  - Cross-hand release: right proximal phalange of index  - Finger pull to right index     Patient provided with PROM into right hand MP flexion- 1 set x 10 reps with 5-10 second holds. Patient tolerates WFL range well. Patient engages in right hand strength and coordination activity using small pegs and resistive clips. Patient picks up five pegs at a time, using thumb and index finger. Patient utilizes in-hand manipulation to place pegs one at a time into peg board. Patient with multiple drops; however, improved sensation and translation skills noted. Patient utilizes 6# resistive clip to remove pegs and return to storage container. Patient engages in additional strength and coordination task with miniature Connect Four game. Patient able to pinch small game pieces and place into board one at a time. Patient again utilizes 6# resistive clip to return to storage container. Patient places right hand into fluidotherapy x20 minutes while performing AROM and gripping while in heat. Patient reports decreased pain while performing exercises in the heat. Patient Education/HEP:   Continue recommended HEP/activities. ASSESSMENT       Assessment:   Patient tolerated treatment session well. Patient demonstrates improved sensitivity to touch and improved overall end range of right index finger. Patient's strength is gradually increasing as well. Patient progressing well towards goals. Patient would benefit from continued occupational therapy to address these deficits and improve overall quality of life. Post Treatment Pain: 0/10    Patient's Activity Tolerance:   Patient tolerated treatment well              GOALS         Long Term Goals  Time Frame for Long term goals : 2x/week for 4-6 weeks, 8-12 visits  Long Term Goal 1: Patient will tolerate touch to right hand index and thumb with reports of pain </=2/10.   Long Term Goal 2: Patient will increase right hand index and thumb MP flexion by 20* to increase functional use of hand in daily activities. Long Term Goal 3: Patient will demonstrate [facility] right hand coordination as measured by a 10 second improvement on 9-HPT. Long Term Goal 4: Patient will increase right hand  strength by 15-20# to increase ease with I/ADLs. Long Term Goal 5: Patient will increase right hand pinch strength by 5# to increase functional use of hand in daily activities. Long Term Goal 6: Patient will be independent with all recommended HEP for pain and edema management, ROM, coordination, and strength. TREATMENT PLAN     Patient to continue progress towards current plan of care.  Next Visit: 9/26/2022    Planning discharge at next appointment           Electronically signed by ARI oTlentino/L  on 9/22/2022 at 10:02 AM

## 2022-09-26 ENCOUNTER — HOSPITAL ENCOUNTER (OUTPATIENT)
Dept: OCCUPATIONAL THERAPY | Age: 47
Setting detail: THERAPIES SERIES
Discharge: HOME | End: 2022-09-26
Payer: COMMERCIAL

## 2022-09-26 PROCEDURE — 97530 THERAPEUTIC ACTIVITIES: CPT

## 2022-09-26 NOTE — PROGRESS NOTES
[place] [address] [place] [facility]  [address] [facility] [address]  Dept: [phone]  Dept Fax: [phone]  Loc: [phone]    Occupational Therapy: DISCHARGE    Patient: Hilma Ormond (49 y.o. male)   Date: 2022   :  1975  MRN: [id number]  CSN: [id number]  Account #: [de-identified]   Insurance: Payor: [employer] / Plan: [facility] / Product Type: *No Product type* /   Insurance ID: [id number] - ([facility]) Secondary Insurance (if applicable): Jose Alfredo Aguilar *   Referring Physician: Azael Ahn DO     PCP: Urvashi Jeffries MD  Date of eval: 2022  Last POC date: 2022   Reporting period: 22-22    Visits to Date: Total # of Visits to Date: 13  Progress note:Progress Note Counter: -  Visits Approved: 61 (PT/OT/SLP combined; Secondary - follow Medicare guidelines)    No Show:    Cancelled Appts:      Medical Diagnosis: 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 Patient with improved sensitivity to touch throughout R thumb and index with decreased pain. [x] Met  [] Partially Met  [] Not Met   Long Term Goal 2: Patient will increase right hand index and thumb MP flexion by 20* to increase functional use of hand in daily activities. Right Index MP flexion: 89 (Previous: 85, Evaluation: 75)  Right Thumb MP flexion: 60 (Previous: 55, Evaluation: 30) [] Met  [x] Partially Met  [] Not Met   Long Term Goal 3: Patient will demonstrate WFL right hand coordination as measured by a 10 second improvement on 9-HPT.  Right 9-HPT: 20.35 (Previous: 18.98, Evaluation: 27.21)  Comments: Patient utilizes thumb and 3rd digit to complete  Patient completes using thumb and index: 53.67- previously unable to complete [x] Met  [] Partially Met  [] Not Met   Long Term Goal 4: Patient will increase right hand  strength by 15-20# to increase ease with I/ADLs. Right : 83 (Previous: 75, Evaluation: 77) [] Met  [x] Partially Met  [] Not Met   Long Term Goal 5: Patient will increase right hand pinch strength by 5# to increase functional use of hand in daily activities. Right Lateral pinch: 12.5 (Previous: 11.5, Evaluation: 10)  Right 3-point pinch: NT (Previous: 7.5, Evaluation: 6)  Comment: 3-point pinch NT on this date secondary to new abrasion on thumb [] Met  [x] Partially Met  [] Not Met   Long Term Goal 6: Patient will be independent with all recommended HEP for pain and edema management, ROM, coordination, and strength. Patient is independent with good follow through with all recommended HEP. [x] Met  [] Partially Met  [] Not Met     Functional assessment used: Quick Disabilities of the Arm, Shoulder and Hand (DASH)  Score on eval: 33  Score currently: 34    Comments: Patient to be discharged from outpatient occupational therapy at this time and continue progress via HEP. Rehab Potential: [] Excellent [x] Good     [] Fair [] Poor      Patient Status: [] Continue/Initate plan of Care   [x]  Discharge   []  Additional visits requested, please re-certify for additional visits    Signature: Electronically signed by MICHELA Angel on 9/26/2022 at 0:79 AM.      I certify that the above Occupational Therapy Services are being furnished while the patient is under my care. I agree with the treatment plan and certify that this therapy is necessary.       Physician's Signature:  ___________________________   Date:_______                                                                   Lylia Castleman, DO        Physician Comments: _______________________________________________    Please sign and return to Milana Fenton. Please fax to the location listed below. Dasha Scott for this referral!          If you have any questions or concerns, please don't hesitate to call.   Thank you for your referral!

## 2022-09-26 NOTE — PROGRESS NOTES
[facility]      Occupational Therapy: Daily Note   Patient: Harriett Faulkner (8 y.o. 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 Learning/Language barrier: No       HEP/Strategies/Orthosis Compliance: Patient verbally confirmed compliant with HEP's          Restrictions: None reported           OBJECTIVE EXAMINATION         TREATMENT     Focus of treatment was on the following:   Desensitization, Reassessment, Pain Management      Therapist provides desensitization techniques this date via massage to right hand index and thumb. Patient tolerates well with improved sensitivity at the tip of index and thumb.      Patient was provided with soft tissue mobility and direct myofascial release techniques as follows:  - Transverse plane: right proximal phalange of index  - Cross-hand release: right proximal phalange of index Patient was reassessed for progress towards current plan of care as follows: Tolerance to Touch  Patient continues to demonstrate sensitivity to touch along tip of index and thumb; however, improved. ROM  Right Index MP flexion: 89 (Previous: 85, Evaluation: 75)  Right Thumb MP flexion: 60 (Previous: 55, Evaluation: 30)     Coordination  Right 9-HPT: 20.35 (Previous: 18.98, Evaluation: 27.21)  Comments: Patient utilizes thumb and 3rd digit to complete  Patient completes using thumb and index: 53.67- previously unable to complete       Strength  Right : 83 (Previous: 75, Evaluation: 77)  Right Lateral pinch: 12.5 (Previous: 11.5, Evaluation: 10)  Right 3-point pinch: NT (Previous: 7.5, Evaluation: 6)  Comment: 3-point pinch NT on this date secondary to new abrasion on thumb     HEP  Patient is independent with good follow through with all recommended HEP. Patient Education/HEP:   Continue recommended HEP/activities. ASSESSMENT       Assessment:   Patient tolerated treatment session well. Patient demonstrates improved sensitivity to touch and improved overall end range of right index finger. Patient's strength is gradually increasing as well. Patient progressing well towards goals. Patient to be discharged from outpatient occupational therapy at this time and continue progress via HEP. Post Treatment Pain: 2/10    Patient's Activity Tolerance:   Patient tolerated treatment well              GOALS         Long Term Goals  Time Frame for Long term goals : 2x/week for 4-6 weeks, 8-12 visits  Long Term Goal 1: Patient will tolerate touch to right hand index and thumb with reports of pain </=2/10. Long Term Goal 2: Patient will increase right hand index and thumb MP flexion by 20* to increase functional use of hand in daily activities. Long Term Goal 3: Patient will demonstrate [facility] right hand coordination as measured by a 10 second improvement on 9-HPT.   Long Term Goal 4: Patient will increase right hand  strength by 15-20# to increase ease with I/ADLs. Long Term Goal 5: Patient will increase right hand pinch strength by 5# to increase functional use of hand in daily activities. Additional Goals?: Yes  Long Term Goal 6: Patient will be independent with all recommended HEP for pain and edema management, ROM, coordination, and strength. TREATMENT PLAN     Patient to be discharged from occupational therapy at this time and continue progress via HEP.             Electronically signed by MICHELA Felix  on 9/26/2022 at 9:51 AM

## 2022-10-25 ENCOUNTER — OFFICE VISIT (OUTPATIENT)
Dept: FAMILY MEDICINE CLINIC | Age: 47
End: 2022-10-25
Payer: COMMERCIAL

## 2022-10-25 VITALS
HEART RATE: 74 BPM | OXYGEN SATURATION: 98 % | HEIGHT: 67 IN | DIASTOLIC BLOOD PRESSURE: 84 MMHG | WEIGHT: 135 LBS | SYSTOLIC BLOOD PRESSURE: 102 MMHG | BODY MASS INDEX: 21.19 KG/M2 | TEMPERATURE: 97.2 F

## 2022-10-25 DIAGNOSIS — I73.00 RAYNAUD'S DISEASE WITHOUT GANGRENE: Primary | ICD-10-CM

## 2022-10-25 DIAGNOSIS — Z13.29 SCREENING FOR THYROID DISORDER: ICD-10-CM

## 2022-10-25 DIAGNOSIS — Z13.220 SCREENING, LIPID: ICD-10-CM

## 2022-10-25 DIAGNOSIS — F31.9 BIPOLAR I DISORDER (HCC): ICD-10-CM

## 2022-10-25 DIAGNOSIS — E87.1 HYPONATREMIA: ICD-10-CM

## 2022-10-25 DIAGNOSIS — Z13.1 SCREENING FOR DIABETES MELLITUS: ICD-10-CM

## 2022-10-25 DIAGNOSIS — Z13.0 SCREENING FOR DEFICIENCY ANEMIA: ICD-10-CM

## 2022-10-25 DIAGNOSIS — E55.9 VITAMIN D DEFICIENCY: ICD-10-CM

## 2022-10-25 PROCEDURE — 99214 OFFICE O/P EST MOD 30 MIN: CPT | Performed by: FAMILY MEDICINE

## 2022-10-25 ASSESSMENT — ENCOUNTER SYMPTOMS
SHORTNESS OF BREATH: 0
ABDOMINAL DISTENTION: 0
CHEST TIGHTNESS: 0
ABDOMINAL PAIN: 0
PHOTOPHOBIA: 0

## 2022-10-25 NOTE — PROGRESS NOTES
Diagnosis Orders   1. Raynaud's disease without gangrene  Comprehensive Metabolic Panel      2. Hyponatremia  Comprehensive Metabolic Panel      3. Bipolar I disorder ([facility] [address]  Comprehensive Metabolic Panel      4. Screening for deficiency anemia  CBC with Auto Differential      5. Screening for diabetes mellitus  Comprehensive Metabolic Panel      6. Screening, lipid  Lipid, Fasting      7. Screening for thyroid disorder  TSH with Reflex      8. Vitamin D deficiency  Vitamin D 25 Hydroxy        Return in about 6 months (around 4/25/2023) for for physical exam and eval of preventative need. Patient Instructions   Encouraged patient to restart diltiazem. Labs that are due for health maintenance and medical condition monitoring have been ordered. Encouraged patient to continue to remain abstinent of alcohol. Subjective:      Patient ID: Denise Sow is a 49 y.o. male who presents for:  Chief Complaint   Patient presents with    Health Maintenance     Address hcc's        Pt see [facility] for trileptal    Was doing well off diltiazem in summer but will look to restart for fall and winter    No more alcohol intake. Feeling fine. Not needing BuSpar very often. Finished gabapentin after he healed up from his injury.       Current Outpatient Medications on File Prior to Visit   Medication Sig Dispense Refill    [facility] MELATONIN 3 MG TABS tablet TAKE 3 TABLETS BY MOUTH AT BEDTIME      [facility] NICOTINE TRANSDERMAL SYS 14 MG/24HR APPLY 1 PATCH TO SKIN DAILY      dilTIAZem (CARDIZEM CD) 120 MG extended release capsule Take 1 capsule by mouth daily 90 capsule 2    busPIRone (BUSPAR) 10 MG tablet Take 1 tablet by mouth 2 times daily as needed (anxiety) 60 tablet 3    OXcarbazepine (TRILEPTAL) 600 MG tablet       EPINEPHrine (EPIPEN 2-BI) 0.3 MG/0.3ML SOAJ injection Use as directed for allergic reaction 1 each 1    OXcarbazepine (TRILEPTAL) 300 MG tablet Take by mouth       No current facility-administered medications on file prior to visit. Past Medical History:   Diagnosis Date    Depression     Hypertension      Past Surgical History:   Procedure Laterality Date    COLONOSCOPY N/A 9/12/2022    COLORECTAL CANCER SCREENING, w/polypectomies performed by Maria Victoria Ken MD at [address] [place] [address] Right     Right Thumb and second digit S/P traumatic amputation distal phlanges    FRACTURE SURGERY Left     radius and ulna-- with mutliple skin grafts     Social History     Socioeconomic History    Marital status: Single     Spouse name: Not on file    Number of children: Not on file    Years of education: Not on file    Highest education level: Not on file   Occupational History    Not on file   Tobacco Use    Smoking status: Every Day     Packs/day: 0.50     Years: 35.00     Pack years: 17.50     Types: Cigarettes    Smokeless tobacco: Never   Vaping Use    Vaping Use: Never used   Substance and Sexual Activity    Alcohol use:  Yes     Alcohol/week: 1.0 standard drink     Types: 1 Cans of beer per week     Comment: occasional    Drug use: Yes     Types: Marijuana Macario Patel     Comment: occasional marijuana use    Sexual activity: Yes     Partners: Male     Comment: single male partner   Other Topics Concern    Not on file   Social History Narrative    Not on file     Social Determinants of Health     Financial Resource Strain: Low Risk     Difficulty of Paying Living Expenses: Not hard at all   Food Insecurity: No Food Insecurity    Worried About Running Out of Food in the Last Year: Never true    Ran Out of Food in the Last Year: Never true   Transportation Needs: Not on file   Physical Activity: Sufficiently Active    Days of Exercise per Week: 5 days    Minutes of Exercise per Session: 120 min   Stress: Not on file   Social Connections: Not on file   Intimate Partner Violence: Not At Risk    Fear of Current or Ex-Partner: No    Emotionally Abused: No    Physically Abused: No    Sexually Abused: No   Housing Stability: Not There is no distension. Musculoskeletal:      Cervical back: Neck supple. Skin:     General: Skin is warm and dry. Neurological:      Mental Status: He is alert and oriented to person, place, and time. Coordination: Coordination normal.   Psychiatric:         Thought Content: Thought content normal.         Judgment: Judgment normal.       No results found for this visit on 10/25/22. No results found for this or any previous visit (from the past 2016 hour(s)). [] Pt was seen by provider for      Minutes  Counseling and coordination of care was done for all assessment diagnosis listed for today with patient and any family/friend present. More than 50% of this visit was spent coordinating current care, obtaining information for prior records, and counseling for current plan of action. Assessment:       Diagnosis Orders   1. Raynaud's disease without gangrene  Comprehensive Metabolic Panel      2. Hyponatremia  Comprehensive Metabolic Panel      3. Bipolar I disorder ([facility] [address]  Comprehensive Metabolic Panel      4. Screening for deficiency anemia  CBC with Auto Differential      5. Screening for diabetes mellitus  Comprehensive Metabolic Panel      6. Screening, lipid  Lipid, Fasting      7. Screening for thyroid disorder  TSH with Reflex      8. Vitamin D deficiency  Vitamin D 25 Hydroxy            Orders Placed This Encounter   Procedures    Comprehensive Metabolic Panel     Standing Status:   Future     Standing Expiration Date:   10/25/2023    Lipid, Fasting     Standing Status:   Future     Standing Expiration Date:   10/25/2023    TSH with Reflex     Standing Status:   Future     Standing Expiration Date:   10/25/2023    Vitamin D 25 Hydroxy     Standing Status:   Future     Standing Expiration Date:   10/25/2023    CBC with Auto Differential     Standing Status:   Future     Standing Expiration Date:   10/25/2023         No orders of the defined types were placed in this encounter. Medication List            Accurate as of October 25, 2022 11:55 AM. If you have any questions, ask your nurse or doctor. CONTINUE taking these medications      busPIRone 10 MG tablet  Commonly known as: BUSPAR  Take 1 tablet by mouth 2 times daily as needed (anxiety)     CVS Melatonin 3 MG Tabs tablet  Generic drug: melatonin     [facility]S NICOTINE TRANSDERMAL SYS 14 MG/24HR  Generic drug: nicotine     dilTIAZem 120 MG extended release capsule  Commonly known as: CARDIZEM CD  Take 1 capsule by mouth daily     EPINEPHrine 0.3 MG/0.3ML Soaj injection  Commonly known as: EpiPen 2-Corey  Use as directed for allergic reaction     * OXcarbazepine 300 MG tablet  Commonly known as: TRILEPTAL     * OXcarbazepine 600 MG tablet  Commonly known as: TRILEPTAL           * This list has 2 medication(s) that are the same as other medications prescribed for you. Read the directions carefully, and ask your doctor or other care provider to review them with you. STOP taking these medications      gabapentin 300 MG capsule  Commonly known as: NEURONTIN  Stopped by: Emmy Cortes MD     thiamine 100 MG tablet  Stopped by: Emmy Cortes MD                Plan:   Return in about 6 months (around 4/25/2023) for for physical exam and eval of preventative need. Patient Instructions   Encouraged patient to restart diltiazem. Labs that are due for health maintenance and medical condition monitoring have been ordered. Encouraged patient to continue to remain abstinent of alcohol. This note was partially created with the assistance of dictation. This may lead to grammatical or spelling errors. Benoit Wood M.D.

## 2022-10-25 NOTE — PATIENT INSTRUCTIONS
Encouraged patient to restart diltiazem. Labs that are due for health maintenance and medical condition monitoring have been ordered. Encouraged patient to continue to remain abstinent of alcohol.

## 2022-10-26 DIAGNOSIS — Z13.29 SCREENING FOR THYROID DISORDER: ICD-10-CM

## 2022-10-26 DIAGNOSIS — F31.9 BIPOLAR I DISORDER (HCC): ICD-10-CM

## 2022-10-26 DIAGNOSIS — Z13.0 SCREENING FOR DEFICIENCY ANEMIA: ICD-10-CM

## 2022-10-26 DIAGNOSIS — E87.1 HYPONATREMIA: ICD-10-CM

## 2022-10-26 DIAGNOSIS — Z13.220 SCREENING, LIPID: ICD-10-CM

## 2022-10-26 DIAGNOSIS — I73.00 RAYNAUD'S DISEASE WITHOUT GANGRENE: ICD-10-CM

## 2022-10-26 DIAGNOSIS — E55.9 VITAMIN D DEFICIENCY: ICD-10-CM

## 2022-10-26 DIAGNOSIS — Z13.1 SCREENING FOR DIABETES MELLITUS: ICD-10-CM

## 2022-10-26 LAB
ALBUMIN SERPL-MCNC: 4.6 G/DL (ref 3.5–4.6)
ALP BLD-CCNC: 67 U/L (ref 35–104)
ALT SERPL-CCNC: 10 U/L (ref 0–41)
ANION GAP SERPL CALCULATED.3IONS-SCNC: 11 MEQ/L (ref 9–15)
AST SERPL-CCNC: 17 U/L (ref 0–40)
BASOPHILS ABSOLUTE: 0 K/UL (ref 0–0.2)
BASOPHILS RELATIVE PERCENT: 0.6 %
BILIRUB SERPL-MCNC: 0.3 MG/DL (ref 0.2–0.7)
BUN BLDV-MCNC: 6 MG/DL (ref 6–20)
CALCIUM SERPL-MCNC: 8.9 MG/DL (ref 8.5–9.9)
CHLORIDE BLD-SCNC: 95 MEQ/L (ref 95–107)
CHOLESTEROL, FASTING: 165 MG/DL (ref 0–199)
CO2: 24 MEQ/L (ref 20–31)
CREAT SERPL-MCNC: 0.62 MG/DL (ref 0.7–1.2)
EOSINOPHILS ABSOLUTE: 0.1 K/UL (ref 0–0.7)
EOSINOPHILS RELATIVE PERCENT: 1 %
GFR SERPL CREATININE-BSD FRML MDRD: >60 ML/MIN/{1.73_M2}
GLOBULIN: 2.6 G/DL (ref 2.3–3.5)
GLUCOSE BLD-MCNC: 84 MG/DL (ref 70–99)
HCT VFR BLD CALC: 44.5 % (ref 42–52)
HDLC SERPL-MCNC: 57 MG/DL (ref 40–59)
HEMOGLOBIN: 15.1 G/DL (ref 14–18)
LDL CHOLESTEROL CALCULATED: 101 MG/DL (ref 0–129)
LYMPHOCYTES ABSOLUTE: 1.5 K/UL (ref 1–4.8)
LYMPHOCYTES RELATIVE PERCENT: 17.9 %
MCH RBC QN AUTO: 31.8 PG (ref 27–31.3)
MCHC RBC AUTO-ENTMCNC: 33.8 % (ref 33–37)
MCV RBC AUTO: 93.9 FL (ref 79–92.2)
MONOCYTES ABSOLUTE: 0.5 K/UL (ref 0.2–0.8)
MONOCYTES RELATIVE PERCENT: 5.7 %
NEUTROPHILS ABSOLUTE: 6.4 K/UL (ref 1.4–6.5)
NEUTROPHILS RELATIVE PERCENT: 74.8 %
PDW BLD-RTO: 13.1 % (ref 11.5–14.5)
PLATELET # BLD: 300 K/UL (ref 130–400)
POTASSIUM SERPL-SCNC: 4.4 MEQ/L (ref 3.4–4.9)
RBC # BLD: 4.74 M/UL (ref 4.7–6.1)
SODIUM BLD-SCNC: 130 MEQ/L (ref 135–144)
TOTAL PROTEIN: 7.2 G/DL (ref 6.3–8)
TRIGLYCERIDE, FASTING: 35 MG/DL (ref 0–150)
TSH REFLEX: 0.8 UIU/ML (ref 0.44–3.86)
WBC # BLD: 8.5 K/UL (ref 4.8–10.8)

## 2022-10-27 LAB — VITAMIN D 25-HYDROXY: 35.3 NG/ML

## 2022-10-27 NOTE — RESULT ENCOUNTER NOTE
Notify patient current lab values are improving or stable. No changes to be made in medical management at this time.

## 2023-03-09 ENCOUNTER — OFFICE VISIT (OUTPATIENT)
Dept: FAMILY MEDICINE CLINIC | Age: 48
End: 2023-03-09
Payer: COMMERCIAL

## 2023-03-09 VITALS
DIASTOLIC BLOOD PRESSURE: 100 MMHG | OXYGEN SATURATION: 98 % | HEART RATE: 95 BPM | SYSTOLIC BLOOD PRESSURE: 138 MMHG | HEIGHT: 67 IN | WEIGHT: 137.2 LBS | TEMPERATURE: 97.3 F | BODY MASS INDEX: 21.53 KG/M2

## 2023-03-09 DIAGNOSIS — R03.0 ELEVATED BLOOD PRESSURE READING: ICD-10-CM

## 2023-03-09 DIAGNOSIS — F31.9 BIPOLAR I DISORDER (HCC): ICD-10-CM

## 2023-03-09 DIAGNOSIS — T14.8XXA MUSCLE STRAIN: Primary | ICD-10-CM

## 2023-03-09 DIAGNOSIS — R14.3 FLATUS: ICD-10-CM

## 2023-03-09 DIAGNOSIS — R10.32 LLQ PAIN: ICD-10-CM

## 2023-03-09 PROCEDURE — 99214 OFFICE O/P EST MOD 30 MIN: CPT | Performed by: FAMILY MEDICINE

## 2023-03-09 SDOH — ECONOMIC STABILITY: FOOD INSECURITY: WITHIN THE PAST 12 MONTHS, YOU WORRIED THAT YOUR FOOD WOULD RUN OUT BEFORE YOU GOT MONEY TO BUY MORE.: NEVER TRUE

## 2023-03-09 SDOH — ECONOMIC STABILITY: FOOD INSECURITY: WITHIN THE PAST 12 MONTHS, THE FOOD YOU BOUGHT JUST DIDN'T LAST AND YOU DIDN'T HAVE MONEY TO GET MORE.: NEVER TRUE

## 2023-03-09 SDOH — ECONOMIC STABILITY: INCOME INSECURITY: HOW HARD IS IT FOR YOU TO PAY FOR THE VERY BASICS LIKE FOOD, HOUSING, MEDICAL CARE, AND HEATING?: NOT HARD AT ALL

## 2023-03-09 SDOH — ECONOMIC STABILITY: HOUSING INSECURITY
IN THE LAST 12 MONTHS, WAS THERE A TIME WHEN YOU DID NOT HAVE A STEADY PLACE TO SLEEP OR SLEPT IN A SHELTER (INCLUDING NOW)?: NO

## 2023-03-09 ASSESSMENT — ENCOUNTER SYMPTOMS
ABDOMINAL DISTENTION: 0
CONSTIPATION: 0
ABDOMINAL PAIN: 1
NAUSEA: 1
BLOOD IN STOOL: 0
VOMITING: 0
ANAL BLEEDING: 0
RECTAL PAIN: 0
DIARRHEA: 0

## 2023-03-09 ASSESSMENT — PATIENT HEALTH QUESTIONNAIRE - PHQ9
6. FEELING BAD ABOUT YOURSELF - OR THAT YOU ARE A FAILURE OR HAVE LET YOURSELF OR YOUR FAMILY DOWN: 0
SUM OF ALL RESPONSES TO PHQ QUESTIONS 1-9: 0
2. FEELING DOWN, DEPRESSED OR HOPELESS: 0
3. TROUBLE FALLING OR STAYING ASLEEP: 0
1. LITTLE INTEREST OR PLEASURE IN DOING THINGS: 0
8. MOVING OR SPEAKING SO SLOWLY THAT OTHER PEOPLE COULD HAVE NOTICED. OR THE OPPOSITE, BEING SO FIGETY OR RESTLESS THAT YOU HAVE BEEN MOVING AROUND A LOT MORE THAN USUAL: 0
7. TROUBLE CONCENTRATING ON THINGS, SUCH AS READING THE NEWSPAPER OR WATCHING TELEVISION: 0
10. IF YOU CHECKED OFF ANY PROBLEMS, HOW DIFFICULT HAVE THESE PROBLEMS MADE IT FOR YOU TO DO YOUR WORK, TAKE CARE OF THINGS AT HOME, OR GET ALONG WITH OTHER PEOPLE: 0
4. FEELING TIRED OR HAVING LITTLE ENERGY: 0
5. POOR APPETITE OR OVEREATING: 0
SUM OF ALL RESPONSES TO PHQ QUESTIONS 1-9: 0
SUM OF ALL RESPONSES TO PHQ QUESTIONS 1-9: 0
SUM OF ALL RESPONSES TO PHQ9 QUESTIONS 1 & 2: 0
SUM OF ALL RESPONSES TO PHQ QUESTIONS 1-9: 0
9. THOUGHTS THAT YOU WOULD BE BETTER OFF DEAD, OR OF HURTING YOURSELF: 0

## 2023-03-09 NOTE — PROGRESS NOTES
Diagnosis Orders   1. Muscle strain        2. LLQ pain  CBC with Auto Differential    Sedimentation Rate    NILSA Screen With Reflex    Urinalysis, Micro      3. Bipolar I disorder ([address]        4. Flatus        5. Elevated blood pressure reading          Return for appt with Dr. Marquis Lozano for OMT. Patient Instructions   Laboratory testing will be done to look for signs of inflammation. CT scan and colonoscopy both within the last 2 years have been reviewed and do not demonstrate any evidence of diverticulosis therefore diverticulitis is unlikely. No obvious abdominal or inguinal hernia noted. Suspect oblique strain . consideration for CAT scan looking at possible small hernia in the future would be an option. Start with OMT due to patient's history of job duties leading to likely oblique muscle irritation. Patient will add Metamucil with 8 ounces of water to daily regimen and monitor flatus/gas. Patient can consider Lactaid if consuming milk containing products such as ice cream, yogurt, cheese. Patient is already avoiding milk. Blood pressure elevated today but had previously been well controlled, recheck in the next week. Adjust dosing if necessary    Subjective:      Patient ID: Milvia Powell is a 49 y.o. male who presents for:  Chief Complaint   Patient presents with    Abdominal Pain     LLQ pain that runs down into his groin, no painful urination, no frequency, bowel movement is painful   No appetite, spicy food hurt, carbonates hurt , coffee even hurts. States overall mood is stable on current medications happy with current treatments. He is here because he continues to have discomfort in his left lower quadrant. He notes it goes from his side to his front of his pelvis and sometimes to his groin. Denies any particular bulge or irritation. Denies fever or chills. Denies diarrhea. Does notice a lot of gas.   Has a history of lactose intolerance for which he avoids drinking milk but he does not avoid other dairy products. This is a number of food he eats daily and most of them are not fiber oriented. Is noticing when he has difficulty with gas he also usually has difficulty with burping. Denies any black or bloody stools. If he lays flat he can get the discomfort to go away. If he is active in any direction he has the discomfort especially sitting. 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      Current Outpatient Medications on File Prior to Visit   Medication Sig Dispense Refill    [facility] MELATONIN 3 MG TABS tablet TAKE 3 TABLETS BY MOUTH AT BEDTIME      [facility]S NICOTINE TRANSDERMAL SYS 14 MG/24HR APPLY 1 PATCH TO SKIN DAILY      dilTIAZem (CARDIZEM CD) 120 MG extended release capsule Take 1 capsule by mouth daily 90 capsule 2    busPIRone (BUSPAR) 10 MG tablet Take 1 tablet by mouth 2 times daily as needed (anxiety) 60 tablet 3    OXcarbazepine (TRILEPTAL) 600 MG tablet       EPINEPHrine (EPIPEN 2-BI) 0.3 MG/0.3ML SOAJ injection Use as directed for allergic reaction 1 each 1    OXcarbazepine (TRILEPTAL) 300 MG tablet Take by mouth       No current facility-administered medications on file prior to visit.      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    FRACTURE SURGERY Left     radius and ulna-- with mutliple skin grafts     Social History     Socioeconomic History    Marital status: Single     Spouse name: Not on file    Number of children: Not on file    Years of education: Not on file    Highest education level: Not on file   Occupational History    Not on file   Tobacco Use    Smoking status: Every Day     Packs/day: 0.50     Years: 35.00     Pack years: 17.50     Types: Cigarettes    Smokeless tobacco: Never   Vaping Use    Vaping Use: Never used   Substance and Sexual Activity    Alcohol use: Yes     Alcohol/week: 1.0 standard drink     Types: 1 Cans of beer per week     Comment: occasional    Drug use: Yes     Types: Marijuana Robyn Amble)     Comment: occasional marijuana use    Sexual activity: Yes     Partners: Male     Comment: single male partner   Other Topics Concern    Not on file   Social History Narrative    Not on file     Social Determinants of Health     Financial Resource Strain: Low Risk     Difficulty of Paying Living Expenses: Not hard at all   Food Insecurity: No Food Insecurity    Worried About [address] in the Last Year: Never true    [address] [employer]  [facility] in the Last Year: Never true   Transportation Needs: Unknown    Lack of Transportation (Medical): Not on file    Lack of Transportation (Non-Medical):  No   Physical Activity: Sufficiently Active    Days of Exercise per Week: 5 days    Minutes of Exercise per Session: 120 min   Stress: Not on file   Social Connections: Not on file   Intimate Partner Violence: Not At Risk    Fear of Current or Ex-Partner: No    Emotionally Abused: No    Physically Abused: No    Sexually Abused: No   Housing Stability: Unknown    Unable to Pay for Housing in the Last Year: Not on file    Number of Places Lived in the Last Year: Not on file    Unstable Housing in the Last Year: No     Family History   Problem Relation Age of Onset    Bipolar Disorder Sister     Bipolar Disorder Brother     Asthma Neg Hx     Cancer Neg Hx     Heart Attack Neg Hx     Heart Disease Neg Hx     High Blood Pressure Neg Hx     High Cholesterol Neg Hx     Stroke Neg Hx     Colon Cancer Neg Hx      Allergies:  Benadryl [diphenhydramine]    Review of Systems   Constitutional:  Negative for chills, diaphoresis, fatigue and fever. Cardiovascular:  Negative for leg swelling. Gastrointestinal:  Positive for abdominal pain and nausea. Negative for abdominal distention, anal bleeding, blood in stool, constipation, diarrhea, rectal pain and vomiting. Psychiatric/Behavioral:  Negative for sleep disturbance. Objective:   BP (!) 138/100   Pulse 95   Temp 97.3 °F (36.3 °C)   Ht 5' 7\" (1.702 m)   Wt 137 lb 3.2 oz (62.2 kg)   SpO2 98%   BMI 21.49 kg/m²     Physical Exam  Vitals reviewed. Constitutional:       General: He is not in acute distress. Appearance: He is well-developed. HENT:      Head: Normocephalic and atraumatic. Right Ear: External ear normal.      Left Ear: External ear normal.      Nose: Nose normal.   Eyes:      General:         Right eye: No discharge. Left eye: No discharge. Conjunctiva/sclera: Conjunctivae normal.      Pupils: Pupils are equal, round, and reactive to light. Neck:      Thyroid: No thyromegaly. Cardiovascular:      Rate and Rhythm: Normal rate and regular rhythm. Pulmonary:      Effort: Pulmonary effort is normal. No respiratory distress. Abdominal:      General: There is no distension. Palpations: There is no mass. Tenderness: There is no abdominal tenderness. There is no guarding. Hernia: No hernia is present. Musculoskeletal:      Cervical back: Neck supple. Skin:     General: Skin is warm and dry. Neurological:      Mental Status: He is alert and oriented to person, place, and time. Coordination: Coordination normal.   Psychiatric:         Thought Content: Thought content normal.         Judgment: Judgment normal.       No results found for this visit on 03/09/23. No results found for this or any previous visit (from the past 2016 hour(s)).     [] Pt was seen by provider for      Minutes  Counseling and coordination of care was done for all assessment diagnosis listed for today with patient and any family/friend present. More than 50% of this visit was spent coordinating current care, obtaining information for prior records, and counseling for current plan of action. Assessment:       Diagnosis Orders   1. Muscle strain        2. LLQ pain  CBC with Auto Differential    Sedimentation Rate    NILSA Screen With Reflex    Urinalysis, Micro      3. Bipolar I disorder ([address]        4. Flatus        5. Elevated blood pressure reading              Orders Placed This Encounter   Procedures    CBC with Auto Differential     Standing Status:   Future     Standing Expiration Date:   3/9/2024    Sedimentation Rate     Standing Status:   Future     Standing Expiration Date:   3/8/2024    NILSA Screen With Reflex     Standing Status:   Future     Standing Expiration Date:   3/9/2024    Urinalysis, Micro     Standing Status:   Future     Standing Expiration Date:   3/8/2024         No orders of the defined types were placed in this encounter. Medication List            Accurate as of March 9, 2023  3:49 PM. If you have any questions, ask your nurse or doctor. CONTINUE taking these medications      busPIRone 10 MG tablet  Commonly known as: BUSPAR  Take 1 tablet by mouth 2 times daily as needed (anxiety)     CVS Melatonin 3 MG Tabs tablet  Generic drug: melatonin     CVS NICOTINE TRANSDERMAL SYS 14 MG/24HR  Generic drug: nicotine     dilTIAZem 120 MG extended release capsule  Commonly known as: CARDIZEM CD  Take 1 capsule by mouth daily     EPINEPHrine 0.3 MG/0.3ML Soaj injection  Commonly known as: EpiPen 2-Corey  Use as directed for allergic reaction     * OXcarbazepine 300 MG tablet  Commonly known as: TRILEPTAL     * OXcarbazepine 600 MG tablet  Commonly known as: TRILEPTAL           * This list has 2 medication(s) that are the same as other medications prescribed for you.  Read the directions carefully, and ask your doctor or other care provider to review them with you. Plan:   Return for appt with Dr. Phyllis Hurley for OMT. Patient Instructions   Laboratory testing will be done to look for signs of inflammation. CT scan and colonoscopy both within the last 2 years have been reviewed and do not demonstrate any evidence of diverticulosis therefore diverticulitis is unlikely. No obvious abdominal or inguinal hernia noted. Suspect oblique strain . consideration for CAT scan looking at possible small hernia in the future would be an option. Start with OMT due to patient's history of job duties leading to likely oblique muscle irritation. Patient will add Metamucil with 8 ounces of water to daily regimen and monitor flatus/gas. Patient can consider Lactaid if consuming milk containing products such as ice cream, yogurt, cheese. Patient is already avoiding milk. Blood pressure elevated today but had previously been well controlled, recheck in the next week. Adjust dosing if necessary    This note was partially created with the assistance of dictation. This may lead to grammatical or spelling errors. Benoit Tobin M.D.

## 2023-03-09 NOTE — PATIENT INSTRUCTIONS
Laboratory testing will be done to look for signs of inflammation. CT scan and colonoscopy both within the last 2 years have been reviewed and do not demonstrate any evidence of diverticulosis therefore diverticulitis is unlikely. No obvious abdominal or inguinal hernia noted. Suspect oblique strain . consideration for CAT scan looking at possible small hernia in the future would be an option. Start with OMT due to patient's history of job duties leading to likely oblique muscle irritation. Patient will add Metamucil with 8 ounces of water to daily regimen and monitor flatus/gas. Patient can consider Lactaid if consuming milk containing products such as ice cream, yogurt, cheese. Patient is already avoiding milk. Blood pressure elevated today but had previously been well controlled, recheck in the next week.   Adjust dosing if necessary

## 2023-03-10 DIAGNOSIS — R10.32 LLQ PAIN: ICD-10-CM

## 2023-03-10 LAB
BASOPHILS ABSOLUTE: 0 K/UL (ref 0–0.2)
BASOPHILS RELATIVE PERCENT: 0.7 %
EOSINOPHILS ABSOLUTE: 0.1 K/UL (ref 0–0.7)
EOSINOPHILS RELATIVE PERCENT: 2.1 %
HCT VFR BLD CALC: 44.4 % (ref 42–52)
HEMOGLOBIN: 15.5 G/DL (ref 14–18)
LYMPHOCYTES ABSOLUTE: 2.1 K/UL (ref 1–4.8)
LYMPHOCYTES RELATIVE PERCENT: 33.1 %
MCH RBC QN AUTO: 34.4 PG (ref 27–31.3)
MCHC RBC AUTO-ENTMCNC: 34.8 % (ref 33–37)
MCV RBC AUTO: 98.7 FL (ref 79–92.2)
MONOCYTES ABSOLUTE: 0.7 K/UL (ref 0.2–0.8)
MONOCYTES RELATIVE PERCENT: 10.2 %
NEUTROPHILS ABSOLUTE: 3.5 K/UL (ref 1.4–6.5)
NEUTROPHILS RELATIVE PERCENT: 53.9 %
PDW BLD-RTO: 12.8 % (ref 11.5–14.5)
PLATELET # BLD: 272 K/UL (ref 130–400)
RBC # BLD: 4.5 M/UL (ref 4.7–6.1)
SEDIMENTATION RATE, ERYTHROCYTE: 1 MM (ref 0–10)
WBC # BLD: 6.5 K/UL (ref 4.8–10.8)

## 2023-03-13 ENCOUNTER — OFFICE VISIT (OUTPATIENT)
Dept: FAMILY MEDICINE CLINIC | Age: 48
End: 2023-03-13
Payer: COMMERCIAL

## 2023-03-13 VITALS
WEIGHT: 137 LBS | OXYGEN SATURATION: 98 % | SYSTOLIC BLOOD PRESSURE: 112 MMHG | BODY MASS INDEX: 21.46 KG/M2 | DIASTOLIC BLOOD PRESSURE: 80 MMHG | TEMPERATURE: 97.2 F | HEART RATE: 93 BPM

## 2023-03-13 DIAGNOSIS — M99.05 SOMATIC DYSFUNCTION OF PELVIC REGION: ICD-10-CM

## 2023-03-13 DIAGNOSIS — S39.81XA SPORTS HERNIA, INITIAL ENCOUNTER: Primary | ICD-10-CM

## 2023-03-13 DIAGNOSIS — R10.32 LLQ PAIN: ICD-10-CM

## 2023-03-13 DIAGNOSIS — M99.06 SOMATIC DYSFUNCTION OF LOWER EXTREMITIES: ICD-10-CM

## 2023-03-13 DIAGNOSIS — M99.09 SOMATIC DYSFUNCTION OF ABDOMINAL REGION: ICD-10-CM

## 2023-03-13 DIAGNOSIS — M99.02 SOMATIC DYSFUNCTION OF THORACIC REGION: ICD-10-CM

## 2023-03-13 DIAGNOSIS — M99.04 SOMATIC DYSFUNCTION OF SACRAL REGION: ICD-10-CM

## 2023-03-13 LAB
ANA IGG, ELISA: NORMAL
BACTERIA: NEGATIVE /HPF
BILIRUBIN URINE: NEGATIVE
BLOOD, URINE: NEGATIVE
CLARITY: CLEAR
COLOR: YELLOW
EPITHELIAL CELLS, UA: NORMAL /HPF
GLUCOSE URINE: NEGATIVE MG/DL
KETONES, URINE: NEGATIVE MG/DL
LEUKOCYTE ESTERASE, URINE: NEGATIVE
NITRITE, URINE: NEGATIVE
PH UA: 6.5 (ref 5–9)
PROTEIN UA: NEGATIVE MG/DL
RBC UA: NORMAL /HPF (ref 0–2)
SPECIFIC GRAVITY UA: 1 (ref 1–1.03)
UROBILINOGEN, URINE: 0.2 E.U./DL
WBC UA: NORMAL /HPF (ref 0–5)

## 2023-03-13 PROCEDURE — 99214 OFFICE O/P EST MOD 30 MIN: CPT | Performed by: FAMILY MEDICINE

## 2023-03-13 PROCEDURE — 98927 OSTEOPATH MANJ 5-6 REGIONS: CPT | Performed by: FAMILY MEDICINE

## 2023-03-13 NOTE — PROGRESS NOTES
Lavon Callas (:  1975) is a 49 y.o. male,Established patient, here for evaluation of the following chief complaint(s):  Flank Pain (LT lower side pain. Pain will travel down into the LT groin/ upper thigh. Pt states this has been going on for a few weeks )      SUBJECTIVE    History of present illness:     Groin pain    Left-sided  Started 3 weeks ago  States he was working in [place] and felt like he strained something  States he has a constant pain that occasionally flares up  States that sitting in his recliner improves the pain  Is worse with increased activity  Saw his primary care physician and they have modified his diet  Also starting fiber supplementation. States his bowel movements are regular    Review of Symptoms: As per HPI.      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    FRACTURE SURGERY Left     radius and ulna-- with mutliple skin grafts     Family History   Problem Relation Age of Onset    Bipolar Disorder Sister     Bipolar Disorder Brother     Asthma Neg Hx     Cancer Neg Hx     Heart Attack Neg Hx     Heart Disease Neg Hx     High Blood Pressure Neg Hx     High Cholesterol Neg Hx     Stroke Neg Hx     Colon Cancer Neg Hx      Social History     Socioeconomic History    Marital status: Single     Spouse name: Not on file    Number of children: Not on file    Years of education: Not on file    Highest education level: Not on file   Occupational History    Not on file   Tobacco Use    Smoking status: Every Day     Packs/day: 0.50     Years: 35.00     Pack years: 17.50     Types: Cigarettes    Smokeless tobacco: Never   Vaping Use    Vaping Use: Never used   Substance and Sexual Activity    Alcohol use: Yes     Alcohol/week: 1.0 standard drink     Types: 1 Cans of beer per week     Comment: occasional    Drug use: Yes     Types: Marijuana Yu Cotton)     Comment: occasional marijuana use    Sexual activity: Yes     Partners: Male     Comment: single male partner   Other Topics Concern    Not on file   Social History Narrative    Not on file     Social Determinants of Health     Financial Resource Strain: Low Risk     Difficulty of Paying Living Expenses: Not hard at all   Food Insecurity: No Food Insecurity    Worried About [address] in the Last Year: Never true    [facility] food[employer]de in the Last Year: Never true   Transportation Needs: Unknown    Lack of Transportation (Medical): Not on file    Lack of Transportation (Non-Medical): No   Physical Activity: Sufficiently Active    Days of Exercise per Week: 5 days    Minutes of Exercise per Session: 120 min   Stress: Not on file   Social Connections: Not on file   Intimate Partner Violence: Not At Risk    Fear of Current or Ex-Partner: No    Emotionally Abused: No    Physically Abused: No    Sexually Abused: No   Housing Stability: Unknown    Unable to Pay for Housing in the Last Year: Not on file    Number of Places Lived in the Last Year: Not on file    Unstable Housing in the Last Year: No     Benadryl [diphenhydramine]  Prior to Admission medications    Medication Sig Start Date End Date Taking?  Authorizing Provider   CVS MELATONIN 3 MG TABS tablet TAKE 3 TABLETS BY MOUTH AT BEDTIME 7/8/22   Historical Provider, MD   CVS NICOTINE TRANSDERMAL SYS 14 MG/24HR APPLY 1 PATCH TO SKIN DAILY 7/8/22   Historical Provider, MD   dilTIAZem (CARDIZEM CD) 120 MG extended release capsule Take 1 capsule by mouth daily 3/7/22   Trey Webber MD   busPIRone (BUSPAR) 10 MG tablet Take 1 tablet by mouth 2 times daily as needed (anxiety) 1/25/22   Michelle Molina MD   OXcarbazepine (TRILEPTAL) 600 MG tablet  11/12/21   Historical Provider, MD EPINEPHrine (EPIPEN 2-BI) 0.3 MG/0.3ML SOAJ injection Use as directed for allergic reaction 8/29/21   ANNETTE Skinner - CNP   OXcarbazepine (TRILEPTAL) 300 MG tablet Take by mouth    Historical Provider, MD       OBJECTIVE     /80 (Site: Right Upper Arm, Position: Sitting, Cuff Size: Medium Adult)   Pulse 93   Temp 97.2 °F (36.2 °C) (Tympanic)   Wt 137 lb (62.1 kg)   SpO2 98%   BMI 21.46 kg/m²     General: alert and oriented, NAD  Head: normocephalic, atraumatic  Cardiovascular: No cyanosis, no edema  Respiratory: No respiratory distress, no accessory muscle use  Psychological: normal mood and affect    MSK:    Point tenderness over left inguinal region  Lumbar passive range of motion grossly intact in all planes    Neurological:     No focal neurological deficits    Osteopathic structural exam:  Thoracic: T12 FRS left treated with myofascial release    Lumbar: L5 ERS right treated with articulatory technique, left quadratus lumborum restriction treated with direct myofascial release    Sacrum: Left posterior sacral base treated with balanced membranous tension    Pelvis: Superior left pubic shear treated with stills    Lower Extremity: Left femoral acetabular joint externally rotated treated with functional technique, left piriformis restriction treated with fascial ligamentous release, left anterior talus treated with high velocity low amplitude    Outcome of treatment: Treatment tolerated well with improvement in patient's somatic dysfunction. Patient's ambulatory and respiratory biomechanics improved post-treatment. ASSESSMENT/PLAN:    Sports hernia  Acute. Suspect abductor strain  Counseled on activity modification  OMM performed today    Somatic Dysfunction    An exam for somatic dysfunction was indicated to evaluate for musculoskeletal manifestations of the patient's primary complaint.  Relevant somatic dysfunction was identified in multiple region given the neural and myofascial connections to the patient's chief complaint. The risks, benefits, and alternatives to Osteopathic manipulative treatment (OMT) were reviewed with patient who provided verbal consent to proceed with treatment. Gentle OMT was applied using both indirect and direct technique approaches. Patient tolerated treatment well without immediate complications. Post-treatment instructions were reviewed with patient, including post-treatment soreness. Total number of regions treated: 5    Techniques utilized: see above. Return in about 2 weeks (around 3/27/2023) for OMT abd pain . An electronic signature was used to authenticate this note.

## 2023-03-15 NOTE — RESULT ENCOUNTER NOTE
Urine result reviewed. Notify pt normal. No further action at this time. If the patient notices a value in labs that is flagged as abnormal and I am not commenting on it that is because it is medically insignificant. It does not follow a pattern that adds up to a medical problem.

## 2023-03-27 ENCOUNTER — OFFICE VISIT (OUTPATIENT)
Dept: FAMILY MEDICINE CLINIC | Age: 48
End: 2023-03-27
Payer: COMMERCIAL

## 2023-03-27 VITALS
SYSTOLIC BLOOD PRESSURE: 116 MMHG | OXYGEN SATURATION: 96 % | HEIGHT: 69 IN | DIASTOLIC BLOOD PRESSURE: 84 MMHG | WEIGHT: 135.8 LBS | BODY MASS INDEX: 20.11 KG/M2 | HEART RATE: 100 BPM

## 2023-03-27 DIAGNOSIS — M99.06 SOMATIC DYSFUNCTION OF LOWER EXTREMITIES: ICD-10-CM

## 2023-03-27 DIAGNOSIS — M99.03 SOMATIC DYSFUNCTION OF LUMBAR REGION: ICD-10-CM

## 2023-03-27 DIAGNOSIS — S39.81XA SPORTS HERNIA, INITIAL ENCOUNTER: Primary | ICD-10-CM

## 2023-03-27 DIAGNOSIS — M99.04 SOMATIC DYSFUNCTION OF SACRAL REGION: ICD-10-CM

## 2023-03-27 DIAGNOSIS — M99.05 SOMATIC DYSFUNCTION OF PELVIC REGION: ICD-10-CM

## 2023-03-27 PROCEDURE — 99213 OFFICE O/P EST LOW 20 MIN: CPT | Performed by: FAMILY MEDICINE

## 2023-03-27 PROCEDURE — 98926 OSTEOPATH MANJ 3-4 REGIONS: CPT | Performed by: FAMILY MEDICINE

## 2023-03-27 NOTE — PROGRESS NOTES
Mango Melgar (:  1975) is a 49 y.o. male,Established patient, here for evaluation of the following chief complaint(s):  Flank Pain (2 week check up, states better but still having some pain.)        SUBJECTIVE    History of present illness:     Groin pain    States is improving  Reports is not having the same sharp pain  Reports primarily in his inguinal region and radiates into his left upper thigh anteriorly  Does not endorse any new injuries or trauma  States he has not been as active as he would like to be    Review of Symptoms: As per HPI. 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    FRACTURE SURGERY Left     radius and ulna-- with mutliple skin grafts     Family History   Problem Relation Age of Onset    Bipolar Disorder Sister     Bipolar Disorder Brother     Asthma Neg Hx     Cancer Neg Hx     Heart Attack Neg Hx     Heart Disease Neg Hx     High Blood Pressure Neg Hx     High Cholesterol Neg Hx     Stroke Neg Hx     Colon Cancer Neg Hx      Social History     Socioeconomic History    Marital status: Single     Spouse name: Not on file    Number of children: Not on file    Years of education: Not on file    Highest education level: Not on file   Occupational History    Not on file   Tobacco Use    Smoking status: Every Day     Packs/day: 0.50     Years: 35.00     Pack years: 17.50     Types: Cigarettes    Smokeless tobacco: Never   Vaping Use    Vaping Use: Never used   Substance and Sexual Activity    Alcohol use:  Yes     Alcohol/week: 1.0 standard drink     Types: 1 Cans of beer per week     Comment: occasional    Drug use: Yes     Types: Marijuana Trista Tyler     Comment: occasional

## 2023-05-18 ENCOUNTER — TELEPHONE (OUTPATIENT)
Dept: FAMILY MEDICINE CLINIC | Age: 48
End: 2023-05-18

## 2023-07-17 ENCOUNTER — APPOINTMENT (OUTPATIENT)
Dept: GENERAL RADIOLOGY | Age: 48
End: 2023-07-17
Payer: COMMERCIAL

## 2023-07-17 ENCOUNTER — HOSPITAL ENCOUNTER (EMERGENCY)
Age: 48
Discharge: HOME | End: 2023-07-17
Attending: EMERGENCY MEDICINE
Payer: COMMERCIAL

## 2023-07-17 VITALS
OXYGEN SATURATION: 100 % | BODY MASS INDEX: 19.26 KG/M2 | HEIGHT: 69 IN | DIASTOLIC BLOOD PRESSURE: 97 MMHG | RESPIRATION RATE: 17 BRPM | WEIGHT: 130 LBS | SYSTOLIC BLOOD PRESSURE: 138 MMHG | HEART RATE: 59 BPM | TEMPERATURE: 98.3 F

## 2023-07-17 DIAGNOSIS — I10 PRIMARY HYPERTENSION: Primary | ICD-10-CM

## 2023-07-17 LAB
ALBUMIN SERPL-MCNC: 4.4 G/DL (ref 3.5–4.6)
ALP SERPL-CCNC: 71 U/L (ref 35–104)
ALT SERPL-CCNC: 75 U/L (ref 0–41)
AMPHET UR QL SCN: ABNORMAL
ANION GAP SERPL CALCULATED.3IONS-SCNC: 9 MEQ/L (ref 9–15)
AST SERPL-CCNC: 69 U/L (ref 0–40)
BARBITURATES UR QL SCN: ABNORMAL
BASOPHILS # BLD: 0.1 K/UL (ref 0–0.2)
BASOPHILS NFR BLD: 1.3 %
BENZODIAZ UR QL SCN: ABNORMAL
BILIRUB SERPL-MCNC: 0.5 MG/DL (ref 0.2–0.7)
BILIRUB UR QL STRIP: NEGATIVE
BUN SERPL-MCNC: 7 MG/DL (ref 6–20)
CALCIUM SERPL-MCNC: 9.1 MG/DL (ref 8.5–9.9)
CANNABINOIDS UR QL SCN: POSITIVE
CHLORIDE SERPL-SCNC: 102 MEQ/L (ref 95–107)
CLARITY UR: CLEAR
CO2 SERPL-SCNC: 28 MEQ/L (ref 20–31)
COCAINE UR QL SCN: ABNORMAL
COLOR UR: YELLOW
CREAT SERPL-MCNC: 0.54 MG/DL (ref 0.7–1.2)
DRUG SCREEN COMMENT UR-IMP: ABNORMAL
EOSINOPHIL # BLD: 0.1 K/UL (ref 0–0.7)
EOSINOPHIL NFR BLD: 2.6 %
ERYTHROCYTE [DISTWIDTH] IN BLOOD BY AUTOMATED COUNT: 12.7 % (ref 11.5–14.5)
ETHANOL PERCENT: NORMAL G/DL
ETHANOLAMINE SERPL-MCNC: <10 MG/DL (ref 0–0.08)
FENTANYL SCREEN, URINE: ABNORMAL
GLOBULIN SER CALC-MCNC: 2.7 G/DL (ref 2.3–3.5)
GLUCOSE SERPL-MCNC: 94 MG/DL (ref 70–99)
GLUCOSE UR STRIP-MCNC: NEGATIVE MG/DL
HCT VFR BLD AUTO: 45.9 % (ref 42–52)
HGB BLD-MCNC: 15.9 G/DL (ref 14–18)
HGB UR QL STRIP: NEGATIVE
KETONES UR STRIP-MCNC: NEGATIVE MG/DL
LEUKOCYTE ESTERASE UR QL STRIP: NEGATIVE
LYMPHOCYTES # BLD: 1.2 K/UL (ref 1–4.8)
LYMPHOCYTES NFR BLD: 21.5 %
MCH RBC QN AUTO: 35.4 PG (ref 27–31.3)
MCHC RBC AUTO-ENTMCNC: 34.7 % (ref 33–37)
MCV RBC AUTO: 102.3 FL (ref 79–92.2)
METHADONE UR QL SCN: ABNORMAL
MONOCYTES # BLD: 0.7 K/UL (ref 0.2–0.8)
MONOCYTES NFR BLD: 12.6 %
NEUTROPHILS # BLD: 3.3 K/UL (ref 1.4–6.5)
NEUTS SEG NFR BLD: 62 %
NITRITE UR QL STRIP: NEGATIVE
OPIATES UR QL SCN: ABNORMAL
OXYCODONE UR QL SCN: ABNORMAL
PCP UR QL SCN: ABNORMAL
PH UR STRIP: 7.5 [PH] (ref 5–9)
PLATELET # BLD AUTO: 223 K/UL (ref 130–400)
POTASSIUM SERPL-SCNC: 4.3 MEQ/L (ref 3.4–4.9)
PROPOXYPH UR QL SCN: ABNORMAL
PROT SERPL-MCNC: 7.1 G/DL (ref 6.3–8)
PROT UR STRIP-MCNC: NEGATIVE MG/DL
RBC # BLD AUTO: 4.49 M/UL (ref 4.7–6.1)
REASON FOR REJECTION: NORMAL
REJECTED TEST: NORMAL
SODIUM SERPL-SCNC: 139 MEQ/L (ref 135–144)
SP GR UR STRIP: 1.01 (ref 1–1.03)
TSH SERPL-MCNC: 0.84 UIU/ML (ref 0.44–3.86)
URINE REFLEX TO CULTURE: NORMAL
UROBILINOGEN UR STRIP-ACNC: 0.2 E.U./DL
WBC # BLD AUTO: 5.4 K/UL (ref 4.8–10.8)

## 2023-07-17 PROCEDURE — 96374 THER/PROPH/DIAG INJ IV PUSH: CPT

## 2023-07-17 PROCEDURE — 6360000002 HC RX W HCPCS: Performed by: PHYSICIAN ASSISTANT

## 2023-07-17 PROCEDURE — 81003 URINALYSIS AUTO W/O SCOPE: CPT

## 2023-07-17 PROCEDURE — 80053 COMPREHEN METABOLIC PANEL: CPT

## 2023-07-17 PROCEDURE — 84443 ASSAY THYROID STIM HORMONE: CPT

## 2023-07-17 PROCEDURE — 80307 DRUG TEST PRSMV CHEM ANLYZR: CPT

## 2023-07-17 PROCEDURE — 85025 COMPLETE CBC W/AUTO DIFF WBC: CPT

## 2023-07-17 PROCEDURE — 36415 COLL VENOUS BLD VENIPUNCTURE: CPT

## 2023-07-17 PROCEDURE — 93005 ELECTROCARDIOGRAM TRACING: CPT | Performed by: PHYSICIAN ASSISTANT

## 2023-07-17 PROCEDURE — 71045 X-RAY EXAM CHEST 1 VIEW: CPT

## 2023-07-17 PROCEDURE — 82077 ASSAY SPEC XCP UR&BREATH IA: CPT

## 2023-07-17 PROCEDURE — 99285 EMERGENCY DEPT VISIT HI MDM: CPT

## 2023-07-17 RX ORDER — HYDRALAZINE HYDROCHLORIDE 20 MG/ML
10 INJECTION INTRAMUSCULAR; INTRAVENOUS ONCE
Status: COMPLETED | OUTPATIENT
Start: 2023-07-17 | End: 2023-07-17

## 2023-07-17 RX ORDER — DILTIAZEM HYDROCHLORIDE 120 MG/1
120 CAPSULE, COATED, EXTENDED RELEASE ORAL DAILY
Qty: 30 CAPSULE | Refills: 0 | Status: SHIPPED | OUTPATIENT
Start: 2023-07-17 | End: 2023-07-18

## 2023-07-17 RX ADMIN — HYDRALAZINE HYDROCHLORIDE 10 MG: 20 INJECTION INTRAMUSCULAR; INTRAVENOUS at 11:27

## 2023-07-17 ASSESSMENT — PAIN - FUNCTIONAL ASSESSMENT
PAIN_FUNCTIONAL_ASSESSMENT: NONE - DENIES PAIN
PAIN_FUNCTIONAL_ASSESSMENT: NONE - DENIES PAIN

## 2023-07-17 ASSESSMENT — ENCOUNTER SYMPTOMS
ABDOMINAL DISTENTION: 0
ABDOMINAL PAIN: 0
EYE DISCHARGE: 0
SORE THROAT: 0
COLOR CHANGE: 0
CONSTIPATION: 0
SHORTNESS OF BREATH: 0
RHINORRHEA: 0

## 2023-07-17 ASSESSMENT — LIFESTYLE VARIABLES
HOW OFTEN DO YOU HAVE A DRINK CONTAINING ALCOHOL: NEVER
HOW MANY STANDARD DRINKS CONTAINING ALCOHOL DO YOU HAVE ON A TYPICAL DAY: PATIENT DOES NOT DRINK

## 2023-07-17 NOTE — ED PROVIDER NOTES
[facility] ED  eMERGENCY dEPARTMENT eNCOUnter      Pt Name: Darrius Garcia  MRN: [id number]  [address] [facility] [place] 1975  Date of evaluation: 7/17/2023  Provider: Gail Cassidy PA-C    CHIEF COMPLAINT       Chief Complaint   Patient presents with    Hypertension     204/132         HISTORY OF PRESENT ILLNESS   (Location/Symptom, Timing/Onset,Context/Setting, Quality, Duration, Modifying Factors, Severity)  Note limiting factors. Darrius Garcia is a 49 y.o. male who presents to the emergency department with complaint of hypertension. Patient states he was just discharged from an alcohol rehab center, he states while in the center his blood pressure was running high, they had him on propranolol, which she states that sometimes controlled it but overall was not well controlled. Patient states his blood pressure today was high, therefore he came to the ED. He has no other symptoms, no chest pain, no shortness of breath, no nausea vomiting or dizziness. Patient states that he was approximately year ago on Cardizem for blood pressure, but states he stopped taking it. He has not followed up with his family provider since that time. Patient denies any pain, 0 out of 10. Past medical history significant for depression, hypertension. Patient states he is alcohol free's presents 11 July. HPI    NursingNotes were reviewed. REVIEW OF SYSTEMS    (2-9 systems for level 4, 10 or more for level 5)     Review of Systems   Constitutional:  Negative for activity change and appetite change. HENT:  Negative for congestion, ear discharge, ear pain, nosebleeds, rhinorrhea and sore throat. Eyes:  Negative for discharge. Respiratory:  Negative for shortness of breath. Cardiovascular:  Negative for chest pain, palpitations and leg swelling. Hypertension   Gastrointestinal:  Negative for abdominal distention, abdominal pain and constipation. Genitourinary:  Negative for difficulty urinating and dysuria.

## 2023-07-17 NOTE — ED TRIAGE NOTES
Pt arrived due to HTN this morning  Pt states that he recently detoxed from alcohol and was discharge 07/16/2023  Pt states that he is taking propanolol for HTN and took one an hr ago  Pt states that he has felt SOB  Pt last drink was 7/11/2023 and pt denies any WD at this time   Pt is ambulatory and alert and oriented times 4

## 2023-07-18 ENCOUNTER — OFFICE VISIT (OUTPATIENT)
Dept: FAMILY MEDICINE CLINIC | Age: 48
End: 2023-07-18
Payer: COMMERCIAL

## 2023-07-18 VITALS
TEMPERATURE: 97.8 F | SYSTOLIC BLOOD PRESSURE: 138 MMHG | DIASTOLIC BLOOD PRESSURE: 74 MMHG | HEIGHT: 67 IN | HEART RATE: 78 BPM | OXYGEN SATURATION: 98 % | BODY MASS INDEX: 20.56 KG/M2 | WEIGHT: 131 LBS

## 2023-07-18 DIAGNOSIS — I73.00 RAYNAUD'S DISEASE WITHOUT GANGRENE: ICD-10-CM

## 2023-07-18 DIAGNOSIS — F10.10 ALCOHOL ABUSE: ICD-10-CM

## 2023-07-18 DIAGNOSIS — I10 ESSENTIAL HYPERTENSION: Primary | ICD-10-CM

## 2023-07-18 DIAGNOSIS — R79.89 ABNORMAL LFTS: ICD-10-CM

## 2023-07-18 LAB
ALBUMIN SERPL-MCNC: 4.4 G/DL (ref 3.5–4.6)
ALP SERPL-CCNC: 66 U/L (ref 35–104)
ALT SERPL-CCNC: 65 U/L (ref 0–41)
ANION GAP SERPL CALCULATED.3IONS-SCNC: 11 MEQ/L (ref 9–15)
AST SERPL-CCNC: 47 U/L (ref 0–40)
BILIRUB SERPL-MCNC: 0.5 MG/DL (ref 0.2–0.7)
BUN SERPL-MCNC: 7 MG/DL (ref 6–20)
CALCIUM SERPL-MCNC: 9 MG/DL (ref 8.5–9.9)
CHLORIDE SERPL-SCNC: 98 MEQ/L (ref 95–107)
CO2 SERPL-SCNC: 25 MEQ/L (ref 20–31)
CREAT SERPL-MCNC: 0.63 MG/DL (ref 0.7–1.2)
EKG ATRIAL RATE: 58 BPM
EKG P AXIS: 65 DEGREES
EKG P-R INTERVAL: 160 MS
EKG Q-T INTERVAL: 384 MS
EKG QRS DURATION: 78 MS
EKG QTC CALCULATION (BAZETT): 376 MS
EKG R AXIS: 71 DEGREES
EKG T AXIS: 70 DEGREES
EKG VENTRICULAR RATE: 58 BPM
GLOBULIN SER CALC-MCNC: 2.8 G/DL (ref 2.3–3.5)
GLUCOSE SERPL-MCNC: 81 MG/DL (ref 70–99)
POTASSIUM SERPL-SCNC: 4 MEQ/L (ref 3.4–4.9)
PROT SERPL-MCNC: 7.2 G/DL (ref 6.3–8)
SODIUM SERPL-SCNC: 134 MEQ/L (ref 135–144)

## 2023-07-18 PROCEDURE — 3078F DIAST BP <80 MM HG: CPT | Performed by: FAMILY MEDICINE

## 2023-07-18 PROCEDURE — 3075F SYST BP GE 130 - 139MM HG: CPT | Performed by: FAMILY MEDICINE

## 2023-07-18 PROCEDURE — 99214 OFFICE O/P EST MOD 30 MIN: CPT | Performed by: FAMILY MEDICINE

## 2023-07-18 RX ORDER — OXCARBAZEPINE 600 MG/1
600 TABLET, FILM COATED ORAL NIGHTLY
Qty: 1 TABLET | Refills: 0 | Status: SHIPPED
Start: 2023-07-18

## 2023-07-18 RX ORDER — PROPRANOLOL HYDROCHLORIDE 20 MG/1
20 TABLET ORAL 3 TIMES DAILY PRN
Qty: 60 TABLET | Refills: 1 | Status: SHIPPED | OUTPATIENT
Start: 2023-07-18

## 2023-07-18 RX ORDER — OXCARBAZEPINE 300 MG/1
300 TABLET, FILM COATED ORAL DAILY
Qty: 1 TABLET | Refills: 0 | Status: SHIPPED
Start: 2023-07-18

## 2023-07-18 RX ORDER — DILTIAZEM HYDROCHLORIDE 120 MG/1
120 CAPSULE, COATED, EXTENDED RELEASE ORAL DAILY
Qty: 90 CAPSULE | Refills: 3 | Status: SHIPPED | OUTPATIENT
Start: 2023-07-18

## 2023-07-18 RX ORDER — NALTREXONE HYDROCHLORIDE 50 MG/1
50 TABLET, FILM COATED ORAL DAILY
COMMUNITY
Start: 2023-07-13

## 2023-07-18 ASSESSMENT — ENCOUNTER SYMPTOMS
PHOTOPHOBIA: 0
ABDOMINAL DISTENTION: 0
CHEST TIGHTNESS: 0
ABDOMINAL PAIN: 0
SHORTNESS OF BREATH: 0

## 2023-07-18 NOTE — PROGRESS NOTES
times daily as needed (sbp > 160 or dbp > 95   or for anxiety)  Started by: Hazel Goncalves MD            CHANGE how you take these medications      * OXcarbazepine 300 MG tablet  Commonly known as: TRILEPTAL  Take 1 tablet by mouth daily  What changed:   how much to take  when to take this  Changed by: Hazel Goncalves MD     * OXcarbazepine 600 MG tablet  Commonly known as: TRILEPTAL  1 tablet nightly  What changed:   how much to take  when to take this  Changed by: Hazel Goncalves MD           * This list has 2 medication(s) that are the same as other medications prescribed for you. Read the directions carefully, and ask your doctor or other care provider to review them with you.                 CONTINUE taking these medications      busPIRone 10 MG tablet  Commonly known as: BUSPAR  Take 1 tablet by mouth 2 times daily as needed (anxiety)     dilTIAZem 120 MG extended release capsule  Commonly known as: CARDIZEM CD  Take 1 capsule by mouth daily     EPINEPHrine 0.3 MG/0.3ML Soaj injection  Commonly known as: EpiPen 2-Corey  Use as directed for allergic reaction     naltrexone 50 MG tablet  Commonly known as: DEPADE            STOP taking these medications      [facility] Melatonin 3 MG Tabs tablet  Generic drug: melatonin  Stopped by: Hazel Goncalves MD     [facility] NICOTINE TRANSDERMAL SYS 14 MG/24HR  Generic drug: nicotine  Stopped by: Hazel Goncalves MD               Where to Get Your Medications        These medications were sent to [address] [place] [address] [address] [facility], [address] [place] [address], [address] [place] [id number]      Phone: [phone]   dilTIAZem 120 MG extended release capsule  propranolol 20 MG tablet       Information about where to get these medications is not yet available    Ask your nurse or doctor about these medications  OXcarbazepine 300 MG tablet  OXcarbazepine 600 MG tablet           Plan:   Return in about 3 months (around 10/18/2023) for for

## 2023-07-18 NOTE — PATIENT INSTRUCTIONS
Patient will utilize propranolol 20 mg tablets as needed for elevated blood pressures or anxiety. If systolic blood pressures greater than 598 or diastolic blood pressure is greater than 95 he will take 20 mg of propranolol. This also can be used for anxiousness. Patient will continue naloxone treatment through [facility] detox. Continue diltiazem for both blood pressure control as well as renounce symptom control.     Labs are being repeated to verify normalization of prior abnormals

## 2023-07-19 NOTE — RESULT ENCOUNTER NOTE
Notify patient sodium is somewhat better demonstrating decreased alcohol intake. Liver function is also somewhat better. We will repeat these labs again in a month.   Please order CMP with diagnosis of abnormal liver functions and hyponatremia

## 2023-07-20 DIAGNOSIS — E87.1 HYPONATREMIA: Primary | ICD-10-CM

## 2023-07-20 DIAGNOSIS — R94.5 ABNORMAL LIVER FUNCTION: ICD-10-CM

## 2023-07-28 ENCOUNTER — TELEPHONE (OUTPATIENT)
Dept: FAMILY MEDICINE CLINIC | Age: 48
End: 2023-07-28

## 2023-07-28 RX ORDER — NALTREXONE HYDROCHLORIDE 50 MG/1
50 TABLET, FILM COATED ORAL DAILY
Qty: 30 TABLET | Refills: 0 | Status: SHIPPED | OUTPATIENT
Start: 2023-07-28 | End: 2023-08-02 | Stop reason: SDUPTHER

## 2023-07-28 NOTE — TELEPHONE ENCOUNTER
Pt calling asking for a script for Naltrexone 50 mg takes that 1 time daily.     Will be out on Monday     Uses [facility]    [phone]

## 2023-07-28 NOTE — TELEPHONE ENCOUNTER
Orders Placed This Encounter   Medications    naltrexone (DEPADE) 50 MG tablet     Sig: Take 1 tablet by mouth daily     Dispense:  30 tablet     Refill:  0       The above med(s) were e-scripted to the patient's pharmacy.    Please advise patient  Morgan Chiu MD

## 2023-08-02 DIAGNOSIS — F10.10 ALCOHOL ABUSE: ICD-10-CM

## 2023-08-02 DIAGNOSIS — F10.10 ALCOHOL ABUSE: Primary | ICD-10-CM

## 2023-08-02 RX ORDER — NALTREXONE HYDROCHLORIDE 50 MG/1
50 TABLET, FILM COATED ORAL DAILY
Qty: 30 TABLET | Refills: 0 | Status: SHIPPED | OUTPATIENT
Start: 2023-08-02 | End: 2023-08-02 | Stop reason: SDUPTHER

## 2023-08-02 RX ORDER — NALTREXONE HYDROCHLORIDE 50 MG/1
50 TABLET, FILM COATED ORAL DAILY
Qty: 30 TABLET | Refills: 0 | Status: SHIPPED | OUTPATIENT
Start: 2023-08-02

## 2023-08-29 RX ORDER — EPINEPHRINE 0.3 MG/.3ML
INJECTION SUBCUTANEOUS
Qty: 1 EACH | Refills: 1 | Status: SHIPPED | OUTPATIENT
Start: 2023-08-29

## 2023-09-18 DIAGNOSIS — F10.10 ALCOHOL ABUSE: ICD-10-CM

## 2023-09-19 RX ORDER — NALTREXONE HYDROCHLORIDE 50 MG/1
50 TABLET, FILM COATED ORAL DAILY
Qty: 30 TABLET | Refills: 2 | Status: SHIPPED | OUTPATIENT
Start: 2023-09-19 | End: 2023-10-11 | Stop reason: SDUPTHER

## 2023-09-19 NOTE — TELEPHONE ENCOUNTER
Future Appointments    Encounter Information    Provider Department Appt Notes   10/18/2023 Katia Pa MD [facility] Return in about 3 months (around 10/18/2023) for for review of outcome of today's recommendation.      Past Visits    Date Provider Specialty Visit Type Primary Dx   07/18/2023 Katia Pa MD Family Medicine Office Visit Essential hypertension

## 2023-10-11 ENCOUNTER — OFFICE VISIT (OUTPATIENT)
Dept: FAMILY MEDICINE CLINIC | Age: 48
End: 2023-10-11
Payer: COMMERCIAL

## 2023-10-11 VITALS
HEIGHT: 67 IN | SYSTOLIC BLOOD PRESSURE: 100 MMHG | TEMPERATURE: 98.7 F | WEIGHT: 132.8 LBS | BODY MASS INDEX: 20.84 KG/M2 | HEART RATE: 80 BPM | DIASTOLIC BLOOD PRESSURE: 68 MMHG | OXYGEN SATURATION: 97 %

## 2023-10-11 DIAGNOSIS — B07.0 PLANTAR WART: Primary | ICD-10-CM

## 2023-10-11 DIAGNOSIS — F10.10 ALCOHOL ABUSE: ICD-10-CM

## 2023-10-11 DIAGNOSIS — I73.00 RAYNAUD'S DISEASE WITHOUT GANGRENE: ICD-10-CM

## 2023-10-11 PROCEDURE — 99214 OFFICE O/P EST MOD 30 MIN: CPT | Performed by: FAMILY MEDICINE

## 2023-10-11 RX ORDER — NALTREXONE HYDROCHLORIDE 50 MG/1
50 TABLET, FILM COATED ORAL DAILY
Qty: 30 TABLET | Refills: 2 | Status: SHIPPED | OUTPATIENT
Start: 2023-10-11

## 2023-10-11 ASSESSMENT — ENCOUNTER SYMPTOMS
ABDOMINAL PAIN: 0
ABDOMINAL DISTENTION: 0
SHORTNESS OF BREATH: 0
CHEST TIGHTNESS: 0
PHOTOPHOBIA: 0

## 2023-10-11 NOTE — PROGRESS NOTES
daily 1 tablet 0    OXcarbazepine (TRILEPTAL) 600 MG tablet 1 tablet nightly 1 tablet 0    dilTIAZem (CARDIZEM CD) 120 MG extended release capsule Take 1 capsule by mouth daily 90 capsule 3    propranolol (INDERAL) 20 MG tablet Take 1 tablet by mouth 3 times daily as needed (sbp > 160 or dbp > 95   or for anxiety) 60 tablet 1    busPIRone (BUSPAR) 10 MG tablet Take 1 tablet by mouth 2 times daily as needed (anxiety) 60 tablet 3     No current facility-administered medications on file prior to visit. Past Medical History:   Diagnosis Date    Depression     Hypertension      Past Surgical History:   Procedure Laterality Date    COLONOSCOPY N/A 9/12/2022    COLORECTAL CANCER SCREENING, w/polypectomies performed by Linda [facility] at [facility] Right     Right Thumb and second digit S/P traumatic amputation distal phlanges    FRACTURE SURGERY Left     radius and ulna-- with mutliple skin grafts     Social History     Socioeconomic History    Marital status: Single     Spouse name: Not on file    Number of children: Not on file    Years of education: Not on file    Highest education level: Not on file   Occupational History    Not on file   Tobacco Use    Smoking status: Every Day     Packs/day: 0.50     Years: 35.00     Additional pack years: 0.00     Total pack years: 17.50     Types: Cigarettes    Smokeless tobacco: Never   Vaping Use    Vaping Use: Never used   Substance and Sexual Activity    Alcohol use:  Yes     Alcohol/week: 1.0 standard drink of alcohol     Types: 1 Cans of beer per week     Comment: occasional    Drug use: Yes     Types: Marijuana Ada Sella)     Comment: occasional marijuana use    Sexual activity: Yes     Partners: Male     Comment: single male partner   Other Topics Concern    Not on file   Social History Narrative    Not on file     Social Determinants of Health     Financial Resource Strain: Low Risk  (3/9/2023)    Overall Financial Resource Strain (CARDIA)

## 2023-10-11 NOTE — PATIENT INSTRUCTIONS
Patient/family has been instructed to apply liquid Compound W to the wart and cover with duct tape for bedtime. The duct tape will help increase the moisture in the skin which will help the Compound W penetrate. The Compound W will help kill the virus and make the skin peel off of the affected area. The skin peeling will remove the actual wart. It is not successful in the next 4-6 weeks a follow-up appointment for cryotherapy would be indicated. Continue to use the Compound W up to the day of the appointment as this will help make the liquid nitrogen, cryotherapy treatment, more effective     Change in treatment for rainouts. So far the diltiazem seems to be working well. Renewal of naltrexone prescription. Helping patient control urges for alcohol.

## 2023-11-13 DIAGNOSIS — I73.00 RAYNAUD'S DISEASE WITHOUT GANGRENE: ICD-10-CM

## 2023-11-13 DIAGNOSIS — R94.5 ABNORMAL LIVER FUNCTION: ICD-10-CM

## 2023-11-13 DIAGNOSIS — E87.1 HYPONATREMIA: ICD-10-CM

## 2023-11-13 DIAGNOSIS — I10 ESSENTIAL HYPERTENSION: ICD-10-CM

## 2023-11-13 DIAGNOSIS — F10.10 ALCOHOL ABUSE: ICD-10-CM

## 2023-11-13 LAB
ALBUMIN SERPL-MCNC: 4.1 G/DL (ref 3.5–4.6)
ALP SERPL-CCNC: 70 U/L (ref 35–104)
ALT SERPL-CCNC: 12 U/L (ref 0–41)
ANION GAP SERPL CALCULATED.3IONS-SCNC: 11 MEQ/L (ref 9–15)
AST SERPL-CCNC: 11 U/L (ref 0–40)
BILIRUB SERPL-MCNC: <0.2 MG/DL (ref 0.2–0.7)
BUN SERPL-MCNC: 8 MG/DL (ref 6–20)
CALCIUM SERPL-MCNC: 8.7 MG/DL (ref 8.5–9.9)
CHLORIDE SERPL-SCNC: 92 MEQ/L (ref 95–107)
CO2 SERPL-SCNC: 26 MEQ/L (ref 20–31)
CREAT SERPL-MCNC: 0.58 MG/DL (ref 0.7–1.2)
GLOBULIN SER CALC-MCNC: 2.6 G/DL (ref 2.3–3.5)
GLUCOSE SERPL-MCNC: 67 MG/DL (ref 70–99)
POTASSIUM SERPL-SCNC: 4.1 MEQ/L (ref 3.4–4.9)
PROT SERPL-MCNC: 6.7 G/DL (ref 6.3–8)
SODIUM SERPL-SCNC: 129 MEQ/L (ref 135–144)

## 2023-11-15 NOTE — RESULT ENCOUNTER NOTE
Contact patient.  Sodium level is low again.  That generally goes with increasing alcohol intake since his blood sugar is overall normal.  However there are a couple other disorders that could be the problem.  Is he still avoiding alcohol?

## 2023-11-16 NOTE — RESULT ENCOUNTER NOTE
Notify him I like him to see Scott Cabrera with endocrinology to evaluate this hyponatremia more closely.  My prior thought was that it was related to alcohol intake but he no longer has that as a condition.  If he is amenable schedule consultation PAMELA Lindo endocrinology for hyponatremia

## 2023-11-17 DIAGNOSIS — E87.1 HYPONATREMIA: Primary | ICD-10-CM

## 2023-11-30 ENCOUNTER — OFFICE VISIT (OUTPATIENT)
Dept: FAMILY MEDICINE CLINIC | Age: 48
End: 2023-11-30

## 2023-11-30 VITALS — WEIGHT: 132 LBS | HEIGHT: 67 IN | TEMPERATURE: 98.7 F | BODY MASS INDEX: 20.72 KG/M2

## 2023-11-30 DIAGNOSIS — B07.0 PLANTAR WART: Primary | ICD-10-CM

## 2023-11-30 NOTE — PROGRESS NOTES
Diagnosis Orders   1. Plantar wart  MO DESTRUCTION BENIGN LESIONS UP TO 14          Return if symptoms worsen or fail to improve. Orders Placed This Encounter   Procedures    MO DESTRUCTION BENIGN LESIONS UP TO 15       Keegan was seen today for skin exam.    Diagnoses and all orders for this visit:    Plantar wart  -     MO DESTRUCTION BENIGN LESIONS UP TO 14        Return if symptoms worsen or fail to improve. Patient Instructions   Cryotherapy instructions    Post op instructions given. A printed copy provided. It is best to leave blisters alone if they form for the first 1-3 days to allow the desired damaged tissue (precancer lesion, wart, or whatever lesion is being removed) to separate from healthy tissue. The area should be covered with a bandage to prevent blister breakage and dirt exposure. The wounds should remain dry while there is a blister, therefore if this is a sweaty location, like the foot ,you may need to change clothing, such as socks, multiple times per day. Remember that the reason for cryosurgery to be done is to damage skin that is not normal so that it will be removed. Therefore the area could be red or pink, can sting or burn for the first day or 2, will appear raw when the skin sloughs off. When the blister(s) pop or patient removes the top as instructed between day 3-5, apply antibiotic (NOT triple antibiotic, one brand is Neosporin) ointment, usually Bacitracin, and a bandage to affected area(s). The ointment should be applied to the open area as long as it is not covered with skin. Exposed tissue is meant to be moist.      Once a scab is formed the patient may stop applying ointment. The scab may appear yellow while moist, don't confuse this with infection. If the wound is infection pus will drain from the site. If this treatment was for a large wart you may note that a plug of skin may fall out of the area that was treated.   That is the center of the wart

## 2023-12-12 ENCOUNTER — OFFICE VISIT (OUTPATIENT)
Dept: ENDOCRINOLOGY | Age: 48
End: 2023-12-12
Payer: COMMERCIAL

## 2023-12-12 VITALS
WEIGHT: 134 LBS | BODY MASS INDEX: 21.03 KG/M2 | HEIGHT: 67 IN | OXYGEN SATURATION: 99 % | DIASTOLIC BLOOD PRESSURE: 85 MMHG | SYSTOLIC BLOOD PRESSURE: 129 MMHG | HEART RATE: 67 BPM

## 2023-12-12 DIAGNOSIS — E87.1 HYPONATREMIA: Primary | ICD-10-CM

## 2023-12-12 DIAGNOSIS — E16.2 HYPOGLYCEMIA: ICD-10-CM

## 2023-12-12 PROCEDURE — 99214 OFFICE O/P EST MOD 30 MIN: CPT | Performed by: PHYSICIAN ASSISTANT

## 2023-12-12 RX ORDER — OXCARBAZEPINE 150 MG/1
150 TABLET, FILM COATED ORAL 2 TIMES DAILY
COMMUNITY
Start: 2023-11-07

## 2023-12-12 ASSESSMENT — ENCOUNTER SYMPTOMS
COUGH: 0
WHEEZING: 0
RHINORRHEA: 0
ABDOMINAL PAIN: 0
VOMITING: 0
DIARRHEA: 0
SORE THROAT: 0
SINUS PRESSURE: 0
NAUSEA: 0
SHORTNESS OF BREATH: 0

## 2023-12-12 NOTE — PROGRESS NOTES
single male partner   Other Topics Concern    Not on file   Social History Narrative    Not on file     Social Determinants of Health     Financial Resource Strain: Low Risk  (3/9/2023)    Overall Financial Resource Strain (CARDIA)     Difficulty of Paying Living Expenses: Not hard at all   Food Insecurity: Not on file (3/9/2023)   Transportation Needs: Unknown (3/9/2023)    PRAPARE - Transportation     Lack of Transportation (Medical): Not on file     Lack of Transportation (Non-Medical):  No   Physical Activity: Sufficiently Active (8/29/2022)    Exercise Vital Sign     Days of Exercise per Week: 5 days     Minutes of Exercise per Session: 120 min   Stress: Not on file   Social Connections: Not on file   Intimate Partner Violence: Not At Risk (8/29/2022)    Humiliation, Afraid, Rape, and Kick questionnaire     Fear of Current or Ex-Partner: No     Emotionally Abused: No     Physically Abused: No     Sexually Abused: No   Housing Stability: Unknown (3/9/2023)    Housing Stability Vital Sign     Unable to Pay for Housing in the Last Year: Not on file     Number of [address] in the Last Year: Not on file     Unstable Housing in the Last Year: No     Family History   Problem Relation Age of Onset    Alcohol Abuse Father     Bipolar Disorder Sister     Bipolar Disorder Brother     Asthma Neg Hx     Cancer Neg Hx     Heart Attack Neg Hx     Heart Disease Neg Hx     High Blood Pressure Neg Hx     High Cholesterol Neg Hx     Stroke Neg Hx     Colon Cancer Neg Hx      Allergies   Allergen Reactions    Benadryl [Diphenhydramine]        Current Outpatient Medications:     OXcarbazepine (TRILEPTAL) 150 MG tablet, Take 1 tablet by mouth 2 times daily, Disp: , Rfl:     naltrexone (DEPADE) 50 MG tablet, Take 1 tablet by mouth daily, Disp: 30 tablet, Rfl: 2    EPINEPHrine (EPIPEN 2-BI) 0.3 MG/0.3ML SOAJ injection, Use as directed for allergic reaction, Disp: 1 each, Rfl: 1    OXcarbazepine (TRILEPTAL) 300 MG tablet, Take 1

## 2024-01-23 DIAGNOSIS — E87.1 HYPONATREMIA: ICD-10-CM

## 2024-01-23 DIAGNOSIS — E16.2 HYPOGLYCEMIA: ICD-10-CM

## 2024-01-23 LAB
ALBUMIN SERPL-MCNC: 4.2 G/DL (ref 3.5–4.6)
ALP SERPL-CCNC: 71 U/L (ref 35–104)
ALT SERPL-CCNC: <5 U/L (ref 0–41)
ANION GAP SERPL CALCULATED.3IONS-SCNC: 12 MEQ/L (ref 9–15)
AST SERPL-CCNC: 15 U/L (ref 0–40)
BILIRUB SERPL-MCNC: 0.3 MG/DL (ref 0.2–0.7)
BUN SERPL-MCNC: 9 MG/DL (ref 6–20)
CALCIUM SERPL-MCNC: 9 MG/DL (ref 8.5–9.9)
CHLORIDE SERPL-SCNC: 98 MEQ/L (ref 95–107)
CO2 SERPL-SCNC: 26 MEQ/L (ref 20–31)
CREAT SERPL-MCNC: 0.69 MG/DL (ref 0.7–1.2)
GLOBULIN SER CALC-MCNC: 2.6 G/DL (ref 2.3–3.5)
GLUCOSE SERPL-MCNC: 98 MG/DL (ref 70–99)
HBA1C MFR BLD: 5.5 % (ref 4.8–5.9)
POTASSIUM SERPL-SCNC: 5.3 MEQ/L (ref 3.4–4.9)
PROT SERPL-MCNC: 6.8 G/DL (ref 6.3–8)
SODIUM SERPL-SCNC: 136 MEQ/L (ref 135–144)

## 2024-01-24 LAB
C PEPTIDE SERPL-MCNC: 2.3 NG/ML (ref 1.1–4.4)
CORTISOL COLLECTION INFO: NORMAL
CORTISOL: 9.3 UG/DL (ref 2.7–18.4)
OSMOLALITY URINE: 363 MOSM/KG (ref 80–1300)

## 2024-01-26 LAB
INSULIN FREE SERPL-ACNC: 11 UIU/ML (ref 3–25)
INSULIN SERPL-ACNC: 12 UIU/ML (ref 3–25)

## 2024-01-30 ENCOUNTER — OFFICE VISIT (OUTPATIENT)
Dept: ENDOCRINOLOGY | Age: 49
End: 2024-01-30
Payer: COMMERCIAL

## 2024-01-30 VITALS
HEART RATE: 61 BPM | DIASTOLIC BLOOD PRESSURE: 77 MMHG | WEIGHT: 137 LBS | BODY MASS INDEX: 21.5 KG/M2 | SYSTOLIC BLOOD PRESSURE: 119 MMHG | HEIGHT: 67 IN

## 2024-01-30 DIAGNOSIS — E87.1 HYPONATREMIA: Primary | ICD-10-CM

## 2024-01-30 PROCEDURE — 99214 OFFICE O/P EST MOD 30 MIN: CPT | Performed by: PHYSICIAN ASSISTANT

## 2024-01-30 ASSESSMENT — ENCOUNTER SYMPTOMS
VOMITING: 0
WHEEZING: 0
SORE THROAT: 0
SINUS PRESSURE: 0
COUGH: 0
SHORTNESS OF BREATH: 0
RHINORRHEA: 0
DIARRHEA: 0
NAUSEA: 0
ABDOMINAL PAIN: 0

## 2024-01-30 NOTE — PROGRESS NOTES
1/30/2024    Assessment:       Diagnosis Orders   1. Hyponatremia          PLAN:     Follow-up with PCP as scheduled    No orders of the defined types were placed in this encounter.    No orders of the defined types were placed in this encounter.    No follow-ups on file.  Subjective:     Chief Complaint   Patient presents with    Other     hyponatremia       Vitals:    01/30/24 0854   BP: 119/77   Site: Right Upper Arm   Position: Sitting   Cuff Size: Medium Adult   Pulse: 61   Weight: 62.1 kg (137 lb)   Height: 1.702 m (5' 7\")     Wt Readings from Last 3 Encounters:   01/30/24 62.1 kg (137 lb)   12/12/23 60.8 kg (134 lb)   11/30/23 59.9 kg (132 lb)     BP Readings from Last 3 Encounters:   01/30/24 119/77   12/12/23 129/85   10/11/23 100/68     Patient is a 48-year-old male presents today for evaluation of chronic hyponatremia.  Laboratory studies were reviewed back to 2019 and showed chronic intermittent hyponatremia lowest it has ever gotten is 123 in 2020.  He has a history of alcoholism, was drinking up to 20 beers per day, he has been 6 months sober and usually drinks 4-5 bottles of water.  He denies polyuria, polydipsia, nausea, excessive fatigue or headaches.  He has a history of alcoholic hepatitis however liver enzymes have normalized recently.  No history of renal dysfunction or diabetes.  He did show some hypoglycemia on recent nonfasting labs.  Scott has a history of seizure disorder and has been on oxcarbazepine for a number of years.  I am going to order some additional laboratory studies including urine osmolality and ADH levels.  Will make further recommendations and follow-up in 6 weeks      Past Medical History:   Diagnosis Date    Anxiety     Bipolar disorder (HCC)     Depression     Hypertension      Past Surgical History:   Procedure Laterality Date    COLONOSCOPY N/A 9/12/2022    COLORECTAL CANCER SCREENING, w/polypectomies performed by Connei Vallecillo MD at [facility]    FINGER

## 2024-02-06 DIAGNOSIS — F10.10 ALCOHOL ABUSE: ICD-10-CM

## 2024-02-06 RX ORDER — NALTREXONE HYDROCHLORIDE 50 MG/1
50 TABLET, FILM COATED ORAL DAILY
Qty: 30 TABLET | Refills: 2 | Status: SHIPPED | OUTPATIENT
Start: 2024-02-06

## 2024-02-06 NOTE — TELEPHONE ENCOUNTER
LOV 10/11/2023    Scheduled none : LOV RECOMMENDATIONS     Plan:   Return in about 3 months (around 1/11/2024) for for review of outcome of today's recommendation.

## 2024-03-25 DIAGNOSIS — I10 ESSENTIAL HYPERTENSION: ICD-10-CM

## 2024-03-25 DIAGNOSIS — I73.00 RAYNAUD'S DISEASE WITHOUT GANGRENE: ICD-10-CM

## 2024-03-25 RX ORDER — DILTIAZEM HYDROCHLORIDE 120 MG/1
120 CAPSULE, COATED, EXTENDED RELEASE ORAL DAILY
Qty: 90 CAPSULE | Refills: 3 | Status: SHIPPED | OUTPATIENT
Start: 2024-03-25

## 2024-03-25 NOTE — TELEPHONE ENCOUNTER
Future Appointments    This patient does not currently have any appointments scheduled.  Past Visits    Date Provider Specialty Visit Type Primary Dx   01/30/2024 Scott Cabrera PA Endocrinology Office Visit Hyponatremia   12/12/2023 Scott Cabrera PA Endocrinology Office Visit Hyponatremia   11/30/2023 Benoit Morin MD Family Medicine Office Visit Plantar wart   10/11/2023 Benoit Morin MD Family Medicine Office Visit Plantar wart

## 2024-03-25 NOTE — TELEPHONE ENCOUNTER
Pt is calling wondering whythe  ditiazem (cardizem cd) is canceled.  Is he just done and not needing it anymore. He would like a call  [phone]

## 2024-08-19 ENCOUNTER — OFFICE VISIT (OUTPATIENT)
Dept: FAMILY MEDICINE CLINIC | Age: 49
End: 2024-08-19
Payer: COMMERCIAL

## 2024-08-19 VITALS
OXYGEN SATURATION: 98 % | HEART RATE: 112 BPM | SYSTOLIC BLOOD PRESSURE: 160 MMHG | WEIGHT: 130 LBS | HEIGHT: 69 IN | BODY MASS INDEX: 19.26 KG/M2 | DIASTOLIC BLOOD PRESSURE: 82 MMHG

## 2024-08-19 DIAGNOSIS — E87.1 HYPONATREMIA: ICD-10-CM

## 2024-08-19 DIAGNOSIS — S68.119D TRAUMATIC AMPUTATION OF FINGER, SUBSEQUENT ENCOUNTER: ICD-10-CM

## 2024-08-19 DIAGNOSIS — F10.11 H/O ALCOHOL ABUSE: ICD-10-CM

## 2024-08-19 DIAGNOSIS — J01.90 ACUTE BACTERIAL SINUSITIS: Primary | ICD-10-CM

## 2024-08-19 DIAGNOSIS — B96.89 ACUTE BACTERIAL SINUSITIS: Primary | ICD-10-CM

## 2024-08-19 PROBLEM — S68.119A AMPUTATION, FINGER, TRAUMATIC: Status: ACTIVE | Noted: 2024-08-19

## 2024-08-19 PROCEDURE — 99214 OFFICE O/P EST MOD 30 MIN: CPT | Performed by: FAMILY MEDICINE

## 2024-08-19 RX ORDER — AMOXICILLIN AND CLAVULANATE POTASSIUM 875; 125 MG/1; MG/1
1 TABLET, FILM COATED ORAL 2 TIMES DAILY
Qty: 20 TABLET | Refills: 0 | Status: SHIPPED | OUTPATIENT
Start: 2024-08-19 | End: 2024-08-19 | Stop reason: SDUPTHER

## 2024-08-19 RX ORDER — BENZONATATE 100 MG/1
100 CAPSULE ORAL 3 TIMES DAILY PRN
Qty: 30 CAPSULE | Refills: 0 | Status: SHIPPED | OUTPATIENT
Start: 2024-08-19 | End: 2024-08-29

## 2024-08-19 RX ORDER — BENZONATATE 100 MG/1
100 CAPSULE ORAL 3 TIMES DAILY PRN
Qty: 30 CAPSULE | Refills: 0 | Status: SHIPPED | OUTPATIENT
Start: 2024-08-19 | End: 2024-08-19 | Stop reason: SDUPTHER

## 2024-08-19 RX ORDER — AMOXICILLIN AND CLAVULANATE POTASSIUM 875; 125 MG/1; MG/1
1 TABLET, FILM COATED ORAL 2 TIMES DAILY
Qty: 20 TABLET | Refills: 0 | Status: SHIPPED | OUTPATIENT
Start: 2024-08-19 | End: 2024-08-29

## 2024-08-19 SDOH — ECONOMIC STABILITY: INCOME INSECURITY: HOW HARD IS IT FOR YOU TO PAY FOR THE VERY BASICS LIKE FOOD, HOUSING, MEDICAL CARE, AND HEATING?: NOT VERY HARD

## 2024-08-19 SDOH — ECONOMIC STABILITY: FOOD INSECURITY: WITHIN THE PAST 12 MONTHS, THE FOOD YOU BOUGHT JUST DIDN'T LAST AND YOU DIDN'T HAVE MONEY TO GET MORE.: NEVER TRUE

## 2024-08-19 SDOH — ECONOMIC STABILITY: FOOD INSECURITY: WITHIN THE PAST 12 MONTHS, YOU WORRIED THAT YOUR FOOD WOULD RUN OUT BEFORE YOU GOT MONEY TO BUY MORE.: NEVER TRUE

## 2024-08-19 ASSESSMENT — PATIENT HEALTH QUESTIONNAIRE - PHQ9
SUM OF ALL RESPONSES TO PHQ QUESTIONS 1-9: 0
4. FEELING TIRED OR HAVING LITTLE ENERGY: NOT AT ALL
5. POOR APPETITE OR OVEREATING: NOT AT ALL
SUM OF ALL RESPONSES TO PHQ9 QUESTIONS 1 & 2: 0
6. FEELING BAD ABOUT YOURSELF - OR THAT YOU ARE A FAILURE OR HAVE LET YOURSELF OR YOUR FAMILY DOWN: NOT AT ALL
9. THOUGHTS THAT YOU WOULD BE BETTER OFF DEAD, OR OF HURTING YOURSELF: NOT AT ALL
2. FEELING DOWN, DEPRESSED OR HOPELESS: NOT AT ALL
SUM OF ALL RESPONSES TO PHQ QUESTIONS 1-9: 0
SUM OF ALL RESPONSES TO PHQ QUESTIONS 1-9: 0
10. IF YOU CHECKED OFF ANY PROBLEMS, HOW DIFFICULT HAVE THESE PROBLEMS MADE IT FOR YOU TO DO YOUR WORK, TAKE CARE OF THINGS AT HOME, OR GET ALONG WITH OTHER PEOPLE: NOT DIFFICULT AT ALL
SUM OF ALL RESPONSES TO PHQ QUESTIONS 1-9: 0
1. LITTLE INTEREST OR PLEASURE IN DOING THINGS: NOT AT ALL
3. TROUBLE FALLING OR STAYING ASLEEP: NOT AT ALL
7. TROUBLE CONCENTRATING ON THINGS, SUCH AS READING THE NEWSPAPER OR WATCHING TELEVISION: NOT AT ALL
8. MOVING OR SPEAKING SO SLOWLY THAT OTHER PEOPLE COULD HAVE NOTICED. OR THE OPPOSITE, BEING SO FIGETY OR RESTLESS THAT YOU HAVE BEEN MOVING AROUND A LOT MORE THAN USUAL: NOT AT ALL

## 2024-08-19 ASSESSMENT — ENCOUNTER SYMPTOMS: COUGH: 1

## 2024-08-19 NOTE — PROGRESS NOTES
Diagnosis Orders   1. Acute bacterial sinusitis  amoxicillin-clavulanate (AUGMENTIN) 875-125 MG per tablet    benzonatate (TESSALON) 100 MG capsule    DISCONTINUED: amoxicillin-clavulanate (AUGMENTIN) 875-125 MG per tablet    DISCONTINUED: benzonatate (TESSALON) 100 MG capsule      2. Traumatic amputation of multiple fingers 2022, subsequent encounter        3. Hyponatremia        4. H/O alcohol abuse          Return in about 6 months (around 2/19/2025).  Patient Instructions   Pt will continue on all current therapies without change.     Subjective:      Patient ID: Keegan Hyatt is a 49 y.o. male who presents for:  Chief Complaint   Patient presents with    Hand Injury     Pt had finger amputation and needs letter to continue medical marijuana      Cough     Cough for a few days. It is productive. Spouse was just treated with antibiotics for similar symptoms.        Patient is here for documentation of his history of traumatic amputation of 2 of his fingers of the right hand.  Charting was reviewed.  X-ray confirmation.  Physical exam confirmation.    Patient also notes sinus congestion.  Postnasal drainage.  Somewhat sore throat.  No fever or chills.  Coughing.    Review of endocrinology notes and testing with patient.  He has been drinking Gatorade more routinely to help his sodium remain elevated.    Patient states psychiatry has been filling his naltrexone        Current Outpatient Medications on File Prior to Visit   Medication Sig Dispense Refill    dilTIAZem (CARDIZEM CD) 120 MG extended release capsule TAKE 1 CAPSULE BY MOUTH DAILY 90 capsule 3    naltrexone (DEPADE) 50 MG tablet Take 1 tablet by mouth daily 30 tablet 2    OXcarbazepine (TRILEPTAL) 150 MG tablet Take 1 tablet by mouth 2 times daily      EPINEPHrine (EPIPEN 2-BI) 0.3 MG/0.3ML SOAJ injection Use as directed for allergic reaction 1 each 1    OXcarbazepine (TRILEPTAL) 300 MG tablet Take 1 tablet by mouth daily (Patient taking differently:

## 2024-08-28 DIAGNOSIS — I73.00 RAYNAUD'S DISEASE WITHOUT GANGRENE: ICD-10-CM

## 2024-08-28 DIAGNOSIS — I10 ESSENTIAL HYPERTENSION: ICD-10-CM

## 2024-08-28 RX ORDER — DILTIAZEM HYDROCHLORIDE 120 MG/1
120 CAPSULE, COATED, EXTENDED RELEASE ORAL DAILY
Qty: 30 CAPSULE | Refills: 12 | Status: SHIPPED | OUTPATIENT
Start: 2024-08-28

## 2024-08-28 NOTE — TELEPHONE ENCOUNTER
Patient needs the refill (Ditiazem 120MG) sent to: [facility] [place] [address]/[place]. [address] (PH#-[phone])  He does not have any lmeficationeft-please call/text when sent to pharmacy    His out of town was extended

## 2024-11-08 NOTE — TELEPHONE ENCOUNTER
11/8/24 LMOM and my chart message to call office and schedule PP appt with Dr. Joya.    Pt aware through my chart.

## 2024-11-20 ENCOUNTER — PATIENT MESSAGE (OUTPATIENT)
Dept: FAMILY MEDICINE CLINIC | Age: 49
End: 2024-11-20

## 2024-11-20 DIAGNOSIS — I73.00 RAYNAUD'S DISEASE WITHOUT GANGRENE: ICD-10-CM

## 2024-11-20 DIAGNOSIS — I10 ESSENTIAL HYPERTENSION: ICD-10-CM

## 2024-11-20 RX ORDER — DILTIAZEM HYDROCHLORIDE 120 MG/1
120 CAPSULE, COATED, EXTENDED RELEASE ORAL DAILY
Qty: 90 CAPSULE | Refills: 0 | Status: SHIPPED | OUTPATIENT
Start: 2024-11-20

## 2024-11-20 NOTE — TELEPHONE ENCOUNTER
Comments:     Last Office Visit (last PCP visit):   8/19/2024    Next Visit Date:  Future Appointments   Date Time Provider Department Center   2/17/2025  1:45 PM Benoit Morin MD [facility] E[facility] DEP       **If hasn't been seen in over a year OR hasn't followed up according to last diabetes/ADHD visit, make appointment for patient before sending refill to provider.    Rx requested:  Requested Prescriptions     Pending Prescriptions Disp Refills    dilTIAZem (CARDIZEM CD) 120 MG extended release capsule 90 capsule 3     Sig: Take 1 capsule by mouth daily

## 2025-01-13 DIAGNOSIS — I73.00 RAYNAUD'S DISEASE WITHOUT GANGRENE: ICD-10-CM

## 2025-01-13 DIAGNOSIS — I10 ESSENTIAL HYPERTENSION: ICD-10-CM

## 2025-01-14 DIAGNOSIS — I73.00 RAYNAUD'S DISEASE WITHOUT GANGRENE: ICD-10-CM

## 2025-01-14 DIAGNOSIS — I10 ESSENTIAL HYPERTENSION: ICD-10-CM

## 2025-01-14 RX ORDER — DILTIAZEM HYDROCHLORIDE 120 MG/1
120 CAPSULE, COATED, EXTENDED RELEASE ORAL DAILY
Qty: 90 CAPSULE | Refills: 0 | OUTPATIENT
Start: 2025-01-14

## 2025-01-14 RX ORDER — DILTIAZEM HYDROCHLORIDE 120 MG/1
120 CAPSULE, COATED, EXTENDED RELEASE ORAL DAILY
Qty: 90 CAPSULE | Refills: 3 | Status: SHIPPED | OUTPATIENT
Start: 2025-01-14

## 2025-01-14 NOTE — TELEPHONE ENCOUNTER
Comments:     Last Office Visit (last PCP visit):   8/19/2024    Next Visit Date:  Future Appointments   Date Time Provider Department Center   2/17/2025  1:45 PM Benoit Morin MD [facility] DEP       **If hasn't been seen in over a year OR hasn't followed up according to last diabetes/ADHD visit, make appointment for patient before sending refill to provider.    Rx requested:  Requested Prescriptions     Pending Prescriptions Disp Refills    dilTIAZem (CARDIZEM CD) 120 MG extended release capsule 90 capsule 0     Sig: Take 1 capsule by mouth daily                Ear Cerumen Removal    Date/Time: 7/18/2023 5:23 PM    Performed by: Alexandrea Sutbbs CMA  Authorized by: Alexandria Esposito CNP    Consent:     Consent obtained:  Verbal    Consent given by:  Patient    Risks discussed:  Bleeding, dizziness, incomplete removal, infection, pain and TM perforation  Universal protocol:     Patient identity confirmed:  Verbally with patient  Procedure details:     Location:  L ear    Procedure type: irrigation      Procedure outcomes: cerumen removed    Post-procedure details:     Inspection:  No bleeding    Hearing quality:  Normal    Procedure completion:  Tolerated

## 2025-01-14 NOTE — TELEPHONE ENCOUNTER
Comments:     Last Office Visit (last PCP visit):   8/19/2024    Next Visit Date:  Future Appointments   Date Time Provider Department Center   2/17/2025  1:45 PM Benoit Morin MD [facility] DEP       **If hasn't been seen in over a year OR hasn't followed up according to last diabetes/ADHD visit, make appointment for patient before sending refill to provider.    Rx requested:  Requested Prescriptions     Pending Prescriptions Disp Refills    dilTIAZem (CARDIZEM CD) 120 MG extended release capsule [Pharmacy Med Name: DilTIAZem CD CAP 120MG/24HR] 90 capsule 3     Sig: TAKE 1 CAPSULE BY MOUTH DAILY

## 2025-02-19 ENCOUNTER — OFFICE VISIT (OUTPATIENT)
Dept: FAMILY MEDICINE CLINIC | Age: 50
End: 2025-02-19
Payer: COMMERCIAL

## 2025-02-19 VITALS
SYSTOLIC BLOOD PRESSURE: 122 MMHG | DIASTOLIC BLOOD PRESSURE: 72 MMHG | TEMPERATURE: 97.4 F | OXYGEN SATURATION: 94 % | WEIGHT: 153.4 LBS | HEART RATE: 57 BPM | BODY MASS INDEX: 22.72 KG/M2 | HEIGHT: 69 IN

## 2025-02-19 DIAGNOSIS — R68.82 DECREASED LIBIDO: ICD-10-CM

## 2025-02-19 DIAGNOSIS — Z23 NEED FOR PNEUMOCOCCAL 20-VALENT CONJUGATE VACCINATION: ICD-10-CM

## 2025-02-19 DIAGNOSIS — R79.89 ABNORMAL LFTS: ICD-10-CM

## 2025-02-19 DIAGNOSIS — Z12.5 PROSTATE CANCER SCREENING: ICD-10-CM

## 2025-02-19 DIAGNOSIS — E55.9 VITAMIN D DEFICIENCY: ICD-10-CM

## 2025-02-19 DIAGNOSIS — E87.1 HYPONATREMIA: ICD-10-CM

## 2025-02-19 DIAGNOSIS — I10 ESSENTIAL HYPERTENSION: Primary | ICD-10-CM

## 2025-02-19 PROCEDURE — 99214 OFFICE O/P EST MOD 30 MIN: CPT | Performed by: FAMILY MEDICINE

## 2025-02-19 PROCEDURE — 90471 IMMUNIZATION ADMIN: CPT | Performed by: FAMILY MEDICINE

## 2025-02-19 PROCEDURE — 90677 PCV20 VACCINE IM: CPT | Performed by: FAMILY MEDICINE

## 2025-02-19 PROCEDURE — 3074F SYST BP LT 130 MM HG: CPT | Performed by: FAMILY MEDICINE

## 2025-02-19 PROCEDURE — 3078F DIAST BP <80 MM HG: CPT | Performed by: FAMILY MEDICINE

## 2025-02-19 SDOH — ECONOMIC STABILITY: FOOD INSECURITY: WITHIN THE PAST 12 MONTHS, YOU WORRIED THAT YOUR FOOD WOULD RUN OUT BEFORE YOU GOT MONEY TO BUY MORE.: NEVER TRUE

## 2025-02-19 SDOH — ECONOMIC STABILITY: FOOD INSECURITY: WITHIN THE PAST 12 MONTHS, THE FOOD YOU BOUGHT JUST DIDN'T LAST AND YOU DIDN'T HAVE MONEY TO GET MORE.: NEVER TRUE

## 2025-02-19 ASSESSMENT — ENCOUNTER SYMPTOMS
ABDOMINAL PAIN: 0
CHEST TIGHTNESS: 0
SHORTNESS OF BREATH: 0
PHOTOPHOBIA: 0
ABDOMINAL DISTENTION: 0

## 2025-02-19 ASSESSMENT — PATIENT HEALTH QUESTIONNAIRE - PHQ9
6. FEELING BAD ABOUT YOURSELF - OR THAT YOU ARE A FAILURE OR HAVE LET YOURSELF OR YOUR FAMILY DOWN: NOT AT ALL
2. FEELING DOWN, DEPRESSED OR HOPELESS: NOT AT ALL
3. TROUBLE FALLING OR STAYING ASLEEP: NOT AT ALL
SUM OF ALL RESPONSES TO PHQ QUESTIONS 1-9: 1
9. THOUGHTS THAT YOU WOULD BE BETTER OFF DEAD, OR OF HURTING YOURSELF: NOT AT ALL
SUM OF ALL RESPONSES TO PHQ9 QUESTIONS 1 & 2: 1
4. FEELING TIRED OR HAVING LITTLE ENERGY: NOT AT ALL
SUM OF ALL RESPONSES TO PHQ QUESTIONS 1-9: 1
5. POOR APPETITE OR OVEREATING: NOT AT ALL
SUM OF ALL RESPONSES TO PHQ QUESTIONS 1-9: 1
7. TROUBLE CONCENTRATING ON THINGS, SUCH AS READING THE NEWSPAPER OR WATCHING TELEVISION: NOT AT ALL
SUM OF ALL RESPONSES TO PHQ QUESTIONS 1-9: 1
10. IF YOU CHECKED OFF ANY PROBLEMS, HOW DIFFICULT HAVE THESE PROBLEMS MADE IT FOR YOU TO DO YOUR WORK, TAKE CARE OF THINGS AT HOME, OR GET ALONG WITH OTHER PEOPLE: NOT DIFFICULT AT ALL
1. LITTLE INTEREST OR PLEASURE IN DOING THINGS: SEVERAL DAYS
8. MOVING OR SPEAKING SO SLOWLY THAT OTHER PEOPLE COULD HAVE NOTICED. OR THE OPPOSITE, BEING SO FIGETY OR RESTLESS THAT YOU HAVE BEEN MOVING AROUND A LOT MORE THAN USUAL: NOT AT ALL

## 2025-02-19 NOTE — PROGRESS NOTES
Diagnosis Orders   1. Essential hypertension  Lipid, Fasting    TSH reflex to FT4    Comprehensive Metabolic Panel    CBC with Auto Differential      2. Prostate cancer screening  PSA Screening      3. Hyponatremia  Comprehensive Metabolic Panel      4. Abnormal LFTs  Comprehensive Metabolic Panel      5. Vitamin D deficiency  Vitamin D 25 Hydroxy      6. Decreased libido  Testosterone, free, total      7. Need for pneumococcal 20-valent conjugate vaccination  Pneumococcal, PCV20, PREVNAR 20, (age 6w+), IM, PF        Return in about 6 months (around 8/19/2025) for for physical exam and eval of preventative need.  Patient Instructions   Patient will continue current medications.  All medical conditions under good control.    Screening labs and health maintenance monitoring labs ordered.    Low libido will be investigated with testosterone level    Subjective:      Patient ID: Keegan Hyatt is a 49 y.o. male who presents for:  Chief Complaint   Patient presents with    Sinusitis     Pt returning for 6mo follow up    Immunizations     Pt interested n getting pnuemococcal 2/2     Hormone Issues     Pt states his hormones are low, no motivation or interest doing things. TRT injections?       HPI  Patient denies cardiovascular symptoms.  See review of systems.    Patient states naltrexone is working well for alcohol avoidance.    Patient states noticing decreased sexual drive and interest.  Concern his hormones may be low.  Noticing decreased motivation        Current Outpatient Medications on File Prior to Visit   Medication Sig Dispense Refill    dilTIAZem (CARDIZEM CD) 120 MG extended release capsule TAKE 1 CAPSULE BY MOUTH DAILY 90 capsule 3    naltrexone (DEPADE) 50 MG tablet Take 1 tablet by mouth daily 30 tablet 2    OXcarbazepine (TRILEPTAL) 150 MG tablet Take 1 tablet by mouth 2 times daily      EPINEPHrine (EPIPEN 2-BI) 0.3 MG/0.3ML SOAJ injection Use as directed for allergic reaction 1 each 1

## 2025-02-19 NOTE — PROGRESS NOTES
After obtaining consent, and per orders of Dr. Morin, injection of prevnar-20 was given in the right deltoid by Eduardo Smipson MA. Tolerated well.

## 2025-02-19 NOTE — PATIENT INSTRUCTIONS
Patient will continue current medications.  All medical conditions under good control.    Screening labs and health maintenance monitoring labs ordered.    Low libido will be investigated with testosterone level

## 2025-02-20 DIAGNOSIS — E55.9 VITAMIN D DEFICIENCY: ICD-10-CM

## 2025-02-20 DIAGNOSIS — I10 ESSENTIAL HYPERTENSION: ICD-10-CM

## 2025-02-20 DIAGNOSIS — E87.1 HYPONATREMIA: ICD-10-CM

## 2025-02-20 DIAGNOSIS — R79.89 ABNORMAL LFTS: ICD-10-CM

## 2025-02-20 DIAGNOSIS — R68.82 DECREASED LIBIDO: ICD-10-CM

## 2025-02-20 LAB
ALBUMIN SERPL-MCNC: 4.5 G/DL (ref 3.5–4.6)
ALP SERPL-CCNC: 62 U/L (ref 35–104)
ALT SERPL-CCNC: 7 U/L (ref 0–41)
ANION GAP SERPL CALCULATED.3IONS-SCNC: 11 MEQ/L (ref 9–15)
AST SERPL-CCNC: 14 U/L (ref 0–40)
BASOPHILS # BLD: 0.1 K/UL (ref 0–0.2)
BASOPHILS NFR BLD: 0.7 %
BILIRUB SERPL-MCNC: 0.4 MG/DL (ref 0.2–0.7)
BUN SERPL-MCNC: 8 MG/DL (ref 6–20)
CALCIUM SERPL-MCNC: 9 MG/DL (ref 8.5–9.9)
CHLORIDE SERPL-SCNC: 93 MEQ/L (ref 95–107)
CHOLEST SERPL-MCNC: 164 MG/DL (ref 0–199)
CO2 SERPL-SCNC: 26 MEQ/L (ref 20–31)
CREAT SERPL-MCNC: 0.57 MG/DL (ref 0.7–1.2)
EOSINOPHIL # BLD: 0.1 K/UL (ref 0–0.7)
EOSINOPHIL NFR BLD: 1.4 %
ERYTHROCYTE [DISTWIDTH] IN BLOOD BY AUTOMATED COUNT: 11.9 % (ref 11.5–14.5)
GLOBULIN SER CALC-MCNC: 2.5 G/DL (ref 2.3–3.5)
GLUCOSE SERPL-MCNC: 91 MG/DL (ref 70–99)
HCT VFR BLD AUTO: 42.1 % (ref 42–52)
HDLC SERPL-MCNC: 56 MG/DL (ref 40–59)
HGB BLD-MCNC: 15.2 G/DL (ref 14–18)
LDL CHOLESTEROL: 96 MG/DL (ref 0–129)
LYMPHOCYTES # BLD: 1.6 K/UL (ref 1–4.8)
LYMPHOCYTES NFR BLD: 17.1 %
MCH RBC QN AUTO: 32.5 PG (ref 27–31.3)
MCHC RBC AUTO-ENTMCNC: 36.1 % (ref 33–37)
MCV RBC AUTO: 90 FL (ref 79–92.2)
MONOCYTES # BLD: 0.8 K/UL (ref 0.2–0.8)
MONOCYTES NFR BLD: 8.8 %
NEUTROPHILS # BLD: 6.9 K/UL (ref 1.4–6.5)
NEUTS SEG NFR BLD: 71.7 %
PLATELET # BLD AUTO: 302 K/UL (ref 130–400)
POTASSIUM SERPL-SCNC: 4.7 MEQ/L (ref 3.4–4.9)
PROT SERPL-MCNC: 7 G/DL (ref 6.3–8)
RBC # BLD AUTO: 4.68 M/UL (ref 4.7–6.1)
SODIUM SERPL-SCNC: 130 MEQ/L (ref 135–144)
TRIGLYCERIDE, FASTING: 61 MG/DL (ref 0–150)
TSH REFLEX: 1.6 UIU/ML (ref 0.44–3.86)
WBC # BLD AUTO: 9.6 K/UL (ref 4.8–10.8)

## 2025-02-21 LAB
SHBG SERPL-SCNC: 98 NMOL/L (ref 19–76)
TESTOST FREE SERPL-MCNC: 99.9 PG/ML (ref 47–244)
TESTOST SERPL-MCNC: 961 NG/DL (ref 193–740)
VITAMIN D 25-HYDROXY: 38.9 NG/ML (ref 30–100)

## 2025-02-25 NOTE — RESULT ENCOUNTER NOTE
Labs are normal with the exception that patient continues to have low sodium which he is tolerating.  Specifically his hormone levels are normal actually on the high side.  Is he taking any kind of testosterone supplement?

## 2025-08-05 PROCEDURE — 99284 EMERGENCY DEPT VISIT MOD MDM: CPT

## 2025-08-05 ASSESSMENT — LIFESTYLE VARIABLES
HOW OFTEN DO YOU HAVE A DRINK CONTAINING ALCOHOL: 4 OR MORE TIMES A WEEK
HOW MANY STANDARD DRINKS CONTAINING ALCOHOL DO YOU HAVE ON A TYPICAL DAY: 10 OR MORE

## 2025-08-06 ENCOUNTER — HOSPITAL ENCOUNTER (EMERGENCY)
Age: 50
Discharge: HOME | End: 2025-08-06
Attending: STUDENT IN AN ORGANIZED HEALTH CARE EDUCATION/TRAINING PROGRAM
Payer: COMMERCIAL

## 2025-08-06 ENCOUNTER — APPOINTMENT (OUTPATIENT)
Dept: CT IMAGING | Age: 50
End: 2025-08-06
Payer: COMMERCIAL

## 2025-08-06 VITALS
HEIGHT: 69 IN | WEIGHT: 130 LBS | OXYGEN SATURATION: 98 % | TEMPERATURE: 97.9 F | BODY MASS INDEX: 19.26 KG/M2 | DIASTOLIC BLOOD PRESSURE: 90 MMHG | HEART RATE: 72 BPM | RESPIRATION RATE: 16 BRPM | SYSTOLIC BLOOD PRESSURE: 124 MMHG

## 2025-08-06 DIAGNOSIS — S09.90XA CLOSED HEAD INJURY, INITIAL ENCOUNTER: Primary | ICD-10-CM

## 2025-08-06 LAB
EKG ATRIAL RATE: 80 BPM
EKG DIAGNOSIS: NORMAL
EKG P AXIS: 76 DEGREES
EKG P-R INTERVAL: 180 MS
EKG Q-T INTERVAL: 366 MS
EKG QRS DURATION: 88 MS
EKG QTC CALCULATION (BAZETT): 422 MS
EKG R AXIS: 81 DEGREES
EKG T AXIS: 76 DEGREES
EKG VENTRICULAR RATE: 80 BPM

## 2025-08-06 PROCEDURE — 93005 ELECTROCARDIOGRAM TRACING: CPT | Performed by: STUDENT IN AN ORGANIZED HEALTH CARE EDUCATION/TRAINING PROGRAM

## 2025-08-06 PROCEDURE — 70450 CT HEAD/BRAIN W/O DYE: CPT

## 2025-08-06 ASSESSMENT — PAIN DESCRIPTION - FREQUENCY: FREQUENCY: INTERMITTENT

## 2025-08-06 ASSESSMENT — PAIN SCALES - GENERAL: PAINLEVEL_OUTOF10: 3

## 2025-08-06 ASSESSMENT — PAIN DESCRIPTION - DESCRIPTORS: DESCRIPTORS: ACHING

## (undated) DEVICE — Device: Brand: ENDO SMARTCAP

## (undated) DEVICE — SNARE ENDOSCP AD L240CM LOOP W10MM SHTH DIA2.4MM RND INSUL

## (undated) DEVICE — TUBE SET 96 MM 64 MM H2O PERISTALTIC STD AUX CHANNEL

## (undated) DEVICE — BRUSH ENDO CLN L90.5IN SHTH DIA1.7MM BRIST DIA5-7MM 2-6MM

## (undated) DEVICE — TUBING, SUCTION, 1/4" X 10', STRAIGHT: Brand: MEDLINE

## (undated) DEVICE — FORCEPS BX L240CM JAW DIA2.8MM L CAP W/ NDL MIC MESH TOOTH

## (undated) DEVICE — SINGLE PORT MANIFOLD: Brand: NEPTUNE 2

## (undated) DEVICE — ENDO CARRY-ON PROCEDURE KIT: Brand: ENDO CARRY-ON PROCEDURE KIT

## (undated) DEVICE — TRAP POLYP BALEEN